# Patient Record
Sex: MALE | Race: WHITE | NOT HISPANIC OR LATINO | Employment: OTHER | URBAN - METROPOLITAN AREA
[De-identification: names, ages, dates, MRNs, and addresses within clinical notes are randomized per-mention and may not be internally consistent; named-entity substitution may affect disease eponyms.]

---

## 2018-12-26 ENCOUNTER — HOSPITAL ENCOUNTER (EMERGENCY)
Facility: HOSPITAL | Age: 48
Discharge: HOME | End: 2018-12-26
Attending: EMERGENCY MEDICINE
Payer: MEDICARE

## 2018-12-26 VITALS
RESPIRATION RATE: 20 BRPM | BODY MASS INDEX: 37.03 KG/M2 | WEIGHT: 258.69 LBS | DIASTOLIC BLOOD PRESSURE: 88 MMHG | SYSTOLIC BLOOD PRESSURE: 136 MMHG | OXYGEN SATURATION: 96 % | HEART RATE: 84 BPM | TEMPERATURE: 99 F | HEIGHT: 70 IN

## 2018-12-26 DIAGNOSIS — R11.2 NAUSEA AND VOMITING, INTRACTABILITY OF VOMITING NOT SPECIFIED, UNSPECIFIED VOMITING TYPE: ICD-10-CM

## 2018-12-26 DIAGNOSIS — R51.9 NONINTRACTABLE HEADACHE, UNSPECIFIED CHRONICITY PATTERN, UNSPECIFIED HEADACHE TYPE: Primary | ICD-10-CM

## 2018-12-26 PROCEDURE — 25000003 PHARM REV CODE 250: Performed by: PHYSICIAN ASSISTANT

## 2018-12-26 PROCEDURE — 63600175 PHARM REV CODE 636 W HCPCS: Performed by: PHYSICIAN ASSISTANT

## 2018-12-26 PROCEDURE — 99284 EMERGENCY DEPT VISIT MOD MDM: CPT | Mod: 25

## 2018-12-26 PROCEDURE — 96375 TX/PRO/DX INJ NEW DRUG ADDON: CPT

## 2018-12-26 PROCEDURE — 96365 THER/PROPH/DIAG IV INF INIT: CPT

## 2018-12-26 RX ORDER — METHYLPREDNISOLONE 4 MG/1
TABLET ORAL
Qty: 1 PACKAGE | Refills: 0 | Status: SHIPPED | OUTPATIENT
Start: 2018-12-26 | End: 2019-02-11

## 2018-12-26 RX ORDER — DULOXETIN HYDROCHLORIDE 30 MG/1
30 CAPSULE, DELAYED RELEASE ORAL DAILY
Status: ON HOLD | COMMUNITY
End: 2020-09-07 | Stop reason: CLARIF

## 2018-12-26 RX ORDER — DIPHENHYDRAMINE HYDROCHLORIDE 50 MG/ML
25 INJECTION INTRAMUSCULAR; INTRAVENOUS
Status: COMPLETED | OUTPATIENT
Start: 2018-12-26 | End: 2018-12-26

## 2018-12-26 RX ORDER — PROMETHAZINE HYDROCHLORIDE 25 MG/1
25 TABLET ORAL EVERY 6 HOURS PRN
Qty: 12 TABLET | Refills: 0 | Status: SHIPPED | OUTPATIENT
Start: 2018-12-26 | End: 2019-10-23

## 2018-12-26 RX ORDER — HYDROMORPHONE HYDROCHLORIDE 2 MG/ML
0.5 INJECTION, SOLUTION INTRAMUSCULAR; INTRAVENOUS; SUBCUTANEOUS
Status: COMPLETED | OUTPATIENT
Start: 2018-12-26 | End: 2018-12-26

## 2018-12-26 RX ORDER — KETOROLAC TROMETHAMINE 30 MG/ML
15 INJECTION, SOLUTION INTRAMUSCULAR; INTRAVENOUS
Status: COMPLETED | OUTPATIENT
Start: 2018-12-26 | End: 2018-12-26

## 2018-12-26 RX ADMIN — HYDROMORPHONE HYDROCHLORIDE 0.5 MG: 2 INJECTION INTRAMUSCULAR; INTRAVENOUS; SUBCUTANEOUS at 07:12

## 2018-12-26 RX ADMIN — PROMETHAZINE HYDROCHLORIDE 25 MG: 25 INJECTION INTRAMUSCULAR; INTRAVENOUS at 07:12

## 2018-12-26 RX ADMIN — DIPHENHYDRAMINE HYDROCHLORIDE 25 MG: 50 INJECTION, SOLUTION INTRAMUSCULAR; INTRAVENOUS at 07:12

## 2018-12-26 RX ADMIN — KETOROLAC TROMETHAMINE 15 MG: 30 INJECTION INTRAMUSCULAR; INTRAVENOUS at 07:12

## 2018-12-27 ENCOUNTER — PES CALL (OUTPATIENT)
Dept: ADMINISTRATIVE | Facility: CLINIC | Age: 48
End: 2018-12-27

## 2018-12-27 NOTE — ED PROVIDER NOTES
"   History      Chief Complaint   Patient presents with    Headache     Pt states, "I suffer from cluster headaches and it has made me vomit and I can't keep anything down."       Review of patient's allergies indicates:  No Known Allergies     HPI   HPI    12/26/2018, 7:41 PM   History obtained from the patient      History of Present Illness: Keith Duane Crawford is a 48 y.o. male patient who presents to the Emergency Department for headache for 5 days.  Onset was gradual.  Pmh cluster headaches and says this feels like typical ha.  He has taken bp medication, triptan, aleve (last dose this am) with no relief.  Denies injury, fever, neck stiffness, vision change. Symptoms are moderate in severity.     No further complaints or concerns at this time.           PCP: Judson AGUSTIN Caro, MD       Past Medical History:  Past Medical History:   Diagnosis Date    Arthritis     Back pain     Depression     HTN (hypertension)     Hyperlipidemia     PRISCILLA on CPAP          Past Surgical History:  Past Surgical History:   Procedure Laterality Date    KNEE SURGERY             Family History:  No family history on file.        Social History:  Social History     Tobacco Use    Smoking status: Current Some Day Smoker     Packs/day: 1.00   Substance and Sexual Activity    Alcohol use: No     Alcohol/week: 0.0 oz    Drug use: No    Sexual activity: Not on file       ROS   Review of Systems   Constitutional: Negative for chills and fever.   HENT: Negative for ear pain, facial swelling and sore throat.    Eyes: Negative for pain and visual disturbance.   Respiratory: Negative for shortness of breath.    Cardiovascular: Negative for chest pain.   Gastrointestinal: Negative for abdominal pain.   Genitourinary: Negative for dysuria.   Musculoskeletal: Negative for back pain and neck stiffness.   Skin: Negative for rash and wound.   Neurological: Positive for headaches. Negative for seizures, syncope, facial asymmetry, speech " "difficulty, weakness, light-headedness and numbness.   Hematological: Does not bruise/bleed easily.   All other systems reviewed and are negative.    Review of Systems    Physical Exam      Initial Vitals [12/26/18 1620]   BP Pulse Resp Temp SpO2   (!) 185/107 105 20 98 °F (36.7 °C) 97 %      MAP       --         Physical Exam  Vital signs and nursing notes reviewed.  Constitutional: Patient is in NAD. Awake and alert. Well-developed and well-nourished.  Head: Atraumatic. Normocephalic.  Eyes: PERRL. EOM intact. Conjunctivae nl. No scleral icterus.  ENT: Mucous membranes are moist. Oropharynx is clear.  TM's normal bilaterally.  No mastoid tenderness  Neck: Supple. No JVD. No lymphadenopathy.  No meningismus  Cardiovascular: Regular rate and rhythm. No murmurs, rubs, or gallops. Distal pulses are 2+ and symmetric.  Pulmonary/Chest: No respiratory distress. Clear to auscultation bilaterally. No wheezing, rales, or rhonchi.  Abdominal: Soft. Non-distended. No TTP. No rebound, guarding, or rigidity. Good bowel sounds.  Genitourinary: No CVA tenderness  Musculoskeletal: Moves all extremities. No edema.   Skin: Warm and dry.  Neurological: Awake and alert. No acute focal neurological deficits are appreciated.  No facial droop.  Tongue is midline.  No pronator drift.  Finger to nose normal.  Hand  equal and strong, 5/5 motor strength x 4.    Psychiatric: Normal affect. Good eye contact. Appropriate in content.      ED Course          Procedures  ED Vital Signs:  Vitals:    12/26/18 1620 12/26/18 1904 12/26/18 2023   BP: (!) 185/107 (!) 135/94 136/88   Pulse: 105 94 84   Resp: 20     Temp: 98 °F (36.7 °C) 99.2 °F (37.3 °C)    TempSrc: Oral Oral    SpO2: 97% 96% 96%   Weight: 117.4 kg (258 lb 11.4 oz)     Height: 5' 10" (1.778 m)                   Imaging Results:  Imaging Results    None            The Emergency Provider reviewed the vital signs and test results, which are outlined above.    ED Discussion       "       Medication(s) given in the ER:  Medications   hydromorphone (PF) injection 0.5 mg (0.5 mg Intravenous Given 12/26/18 1918)   diphenhydrAMINE injection 25 mg (25 mg Intravenous Given 12/26/18 1915)   promethazine (PHENERGAN) 25 mg in dextrose 5 % 50 mL IVPB (0 mg Intravenous Stopped 12/26/18 1953)   ketorolac injection 15 mg (15 mg Intravenous Given 12/26/18 1916)           Follow-up Information     Judson AGUSTIN Caro, MD In 2 days.    Specialty:  Family Medicine  Contact information:  [place] [address]  [phone]                      Medication List      START taking these medications    methylPREDNISolone 4 mg tablet  Commonly known as:  MEDROL DOSEPACK  As directed        CHANGE how you take these medications    promethazine 25 MG tablet  Commonly known as:  PHENERGAN  Take 1 tablet (25 mg total) by mouth every 6 (six) hours as needed for Nausea.  What changed:    · how to take this  · when to take this  · reasons to take this        ASK your doctor about these medications    diazePAM 5 MG tablet  Commonly known as:  VALIUM     dihydroergotamine 0.5 mg/pump act. (4 mg/mL) nasal spray  Commonly known as:  MIGRANAL     DULoxetine 30 MG capsule  Commonly known as:  CYMBALTA     ergocalciferol 50,000 unit Cap  Commonly known as:  ERGOCALCIFEROL     fenofibrate 160 MG Tab     haloperidol 5 MG tablet  Commonly known as:  HALDOL  1 pill at noon daily.     hydrocodone-ibuprofen 7.5-200 mg 7.5-200 mg per tablet  Commonly known as:  VICOPROFEN     indomethacin 50 MG capsule  Commonly known as:  INDOCIN     ketorolac 15.75 mg/spray Spry     lisinopril-hydrochlorothiazide 20-12.5 mg per tablet  Commonly known as:  PRINZIDE,ZESTORETIC     magnesium 250 mg Tab     pantoprazole 20 MG tablet  Commonly known as:  PROTONIX     sertraline 100 MG tablet  Commonly known as:  ZOLOFT     sumatriptan 6 mg/0.5 mL kit  Commonly known as:  IMITREX STATDOSE  1 injection onset migraine, second 2 hours later, no more than  2 per day/3 days use     tiZANidine 4 MG tablet  Commonly known as:  ZANAFLEX  Take 1 tablet (4 mg total) by mouth every 8 (eight) hours as needed.     traZODone 50 MG tablet  Commonly known as:  DESYREL  TAKE 1 1/2 TABLET BY MOUTH EVERY NIGHT AT BEDTIME     verapamil 120 MG tablet  Commonly known as:  CALAN  TAKE 1 TABLET BY MOUTH THREE TIMES DAILY           Where to Get Your Medications      You can get these medications from any pharmacy    Bring a paper prescription for each of these medications  · methylPREDNISolone 4 mg tablet  · promethazine 25 MG tablet           This SmartLink is deprecated. Use Handprint instead to display the medication list for a patient.       Medical Decision Making      Pt says he feels much better and is ready for dc.  All findings were reviewed with the patient/family in detail.   All remaining questions and concerns were addressed at that time.  Patient/family has been counseled regarding the need for follow-up as well as the indication to return to the emergency room should new or worrisome developments occur.        MDM               Clinical Impression:        ICD-10-CM ICD-9-CM   1. Nonintractable headache, unspecified chronicity pattern, unspecified headache type R51 784.0   2. Nausea and vomiting, intractability of vomiting not specified, unspecified vomiting type R11.2 787.01             Sonia Samuel PA-C  12/26/18 2140

## 2019-01-18 ENCOUNTER — TELEPHONE (OUTPATIENT)
Dept: NEUROLOGY | Facility: CLINIC | Age: 49
End: 2019-01-18

## 2019-01-18 NOTE — TELEPHONE ENCOUNTER
----- Message from Philippe Portillo sent at 1/18/2019  2:51 PM CST -----  Contact: Patient @ [phone]  Patient calling to schedule a NP appt, valery call

## 2019-02-11 ENCOUNTER — OFFICE VISIT (OUTPATIENT)
Dept: NEUROLOGY | Facility: CLINIC | Age: 49
End: 2019-02-11
Payer: MEDICARE

## 2019-02-11 VITALS
HEART RATE: 73 BPM | SYSTOLIC BLOOD PRESSURE: 155 MMHG | BODY MASS INDEX: 37.21 KG/M2 | WEIGHT: 259.94 LBS | HEIGHT: 70 IN | DIASTOLIC BLOOD PRESSURE: 108 MMHG

## 2019-02-11 DIAGNOSIS — G47.33 OSA (OBSTRUCTIVE SLEEP APNEA): ICD-10-CM

## 2019-02-11 DIAGNOSIS — G44.021 INTRACTABLE CHRONIC CLUSTER HEADACHE: ICD-10-CM

## 2019-02-11 DIAGNOSIS — F41.9 ANXIETY: ICD-10-CM

## 2019-02-11 PROCEDURE — 3008F BODY MASS INDEX DOCD: CPT | Mod: CPTII,S$GLB,, | Performed by: PSYCHIATRY & NEUROLOGY

## 2019-02-11 PROCEDURE — 3008F PR BODY MASS INDEX (BMI) DOCUMENTED: ICD-10-PCS | Mod: CPTII,S$GLB,, | Performed by: PSYCHIATRY & NEUROLOGY

## 2019-02-11 PROCEDURE — 99999 PR PBB SHADOW E&M-EST. PATIENT-LVL III: ICD-10-PCS | Mod: PBBFAC,,, | Performed by: PSYCHIATRY & NEUROLOGY

## 2019-02-11 PROCEDURE — 99214 OFFICE O/P EST MOD 30 MIN: CPT | Mod: S$GLB,,, | Performed by: PSYCHIATRY & NEUROLOGY

## 2019-02-11 PROCEDURE — 99999 PR PBB SHADOW E&M-EST. PATIENT-LVL III: CPT | Mod: PBBFAC,,, | Performed by: PSYCHIATRY & NEUROLOGY

## 2019-02-11 PROCEDURE — 99499 UNLISTED E&M SERVICE: CPT | Mod: S$GLB,,, | Performed by: PSYCHIATRY & NEUROLOGY

## 2019-02-11 PROCEDURE — 99214 PR OFFICE/OUTPT VISIT, EST, LEVL IV, 30-39 MIN: ICD-10-PCS | Mod: S$GLB,,, | Performed by: PSYCHIATRY & NEUROLOGY

## 2019-02-11 PROCEDURE — 99499 RISK ADDL DX/OHS AUDIT: ICD-10-PCS | Mod: S$GLB,,, | Performed by: PSYCHIATRY & NEUROLOGY

## 2019-02-11 RX ORDER — ALLOPURINOL 300 MG/1
300 TABLET ORAL DAILY
COMMUNITY
Start: 2018-12-27 | End: 2019-10-23

## 2019-02-11 RX ORDER — SUMATRIPTAN SUCCINATE 100 MG/1
100 TABLET ORAL
Status: ON HOLD | COMMUNITY
End: 2019-12-06 | Stop reason: HOSPADM

## 2019-02-11 RX ORDER — BUTORPHANOL TARTRATE 10 MG/ML
1 SPRAY NASAL
Status: ON HOLD | COMMUNITY
Start: 2019-01-23 | End: 2019-12-06 | Stop reason: HOSPADM

## 2019-02-11 RX ORDER — VERAPAMIL HYDROCHLORIDE 240 MG/1
240 TABLET, FILM COATED, EXTENDED RELEASE ORAL 2 TIMES DAILY
Qty: 60 TABLET | Refills: 11 | Status: SHIPPED | OUTPATIENT
Start: 2019-02-11 | End: 2019-10-23

## 2019-02-11 NOTE — PROGRESS NOTES
"Subjective:       Patient ID: Keith Duane Crawford is a 48 y.o. male.    Chief Complaint: Headache    HPI   Ilene Gomez MD, MPH 7/11/18  Interim Headache History:   Mr. Hoffmann returned for follow-up of cluster headache as part of the CGAM (galcanezumab) study (he is in open label treatment, phase IV of the study). He was doing well until July 6, with a few cluster headaches weekly. He used a triptan for them 1-2 times weekly and he did not treat the others. His pattern has been like that for a while with overall improvement (decreased frequency and intensity) compared to baseline. His previous interictal pain had resolved over the past 6 months.     On July 6, he got a cluster headache and used sumatriptan injection that did not do much. He did not sleep that night or the next 2 nights because the pain was worse at night. The pain improved with treatment but did not totally resolve. The pain went away on July 9 in the morning for 3.5 hours and he slept for 3 hours. The headache returned shortly after awakening and he was up again until yesterday morning. He started having auditory hallucinations (which has occurred before because of lack of sleep), blurred vision and "muscle spasms" around and behind his eyes and in his leg. He took Ambien yesterday and slept for about 4 hours. He was headache free when he woke up and then it recurred.     I reviewed his history from the first visit and this pain is the same. He has the headache now. The pain is left side in the anterior side of the head above the ear. It feels like a spike and there is rhinorrhea. When it is not spiking, it feels like "pressing on a bruise" with dull pain.    Oxygen does not work for him. He has been using sumatriptan and tablets with Zofran for nausea since July 6. He has used five 6 ml sumatriptan injections and 3 tablets between July 6 and now. He took Relpax this morning.    He does not recall whether he had a specific time of year " "when he got headaches when they were episodic but recalls being hospitalized in June and August for them in the past before they became chronic so this could be his "season".     He previously went to the ER and got Dilaudid to break his cycle.     Interim Health History: There are no new medical problems since the last visit.     Medications (reviewed in detail with the patient):   Medication Sig Comments    aspirin-acetaminophen-caffeine (GOODY'S EXTRA STRENGTH) 520-260-32.5 mg Oral PwPk Take 2 Packets by mouth four times daily as needed. Max of 3000 mg acetaminophen/24hrs (all sources). 2-3 daily about 3-4 days weekly    DULoxetine delayed release (CYMBALTA) 20 mg oral capsule Take 20 mg by mouth daily. Do not chew, crush, or open capsules.    eletriptan (RELPAX) 40 mg oral tablet Take 40 mg by mouth ONCE PRN. May repeat dose once in 2 hrs if needed. Do not exceed 80 mg/24 hr.    ondansetron (ZOFRAN) 8 mg oral tablet Take 8 mg by mouth as needed.    sumatriptan (IMITREX) 100 mg oral tablet Take 100 mg by mouth ONCE PRN for Migraine. May repeat once after 2 hrs. MAX=200 mg (2 tabs)/24 hrs.    SUMAtriptan (IMITREX) 6 mg/0.5 mL subcutaneous injection Inject 6 mg under skin ONCE PRN. May repeat dose once after 1 hr. No more than 2 doses/24 hrs.   He ran out of lisinopril/HCTZ 6 weeks ago.   Allergies: None    EXAMINATION  Vital signs: Blood pressure 167/118 (164/128), pulse 98, Temp 98.4  Sitting in a chair with rhinorrhea, clearly in extreme discomfort.    ASSESSMENT AND PLAN:   Glacanezumab injections were given per protocol.  He had an appointment to see his PCP in [place] today and does not think that he will have a problem getting in to see him tomorrow instead for his hypertension.    12/28/18 ER  Care Timeline   1612   Arrived   1915   diphenhydramine HCl 25 mg   1916   ketorolac tromethamine 15 mg   1918   hydromorphone HCl/PF 0.5 mg   1926   promethazine (PHENERGAN) 25 mg in dextrose 5 % 50... 25 mg " "  2026   Discharged       Giorgio's note: 4/2016  F/U: 1 month for nerve block and 2 months for botox      S/O: Comes in with his wife, has daily pain, been to ED x2 since last visit. Went to see mental health. States he uses THC every day because "this keeps me from thinking about the fucking pain." Asks about DHE (then tells examiner has had in the past, this not helping). Nerve blocks give him short relief. No side effects from botox, but no help. Infusion of haldol in ED is not helping anymore. He was tearful stating "nothing helps" and feeling "hopeless."     His wife mentions they are thinking about getting an opinion of a nerve stimulator.       Med h/o   Glacanezumab:   Jan - April drug vs placebo   April - March 2019 - monthly 300 mg SQ   severity and Hz less     BOTOX x 4 yrs - later on ineffective   Ketamine infusion -0 3 visits - only temp relief   SPG - Qtip - temp relief     trazadone - not help   sumitrip 100 mg - not help   imitrex 6 mg - helps (5 inj a week)   Stadol nasal - 2 sprays Q4-6 hrs     Failed:   Verapamil, vpa, seroquel, topamax, lyrica, lamictal, keppra, lithium (caused SI)     O2 - not help     For severe pain and hallucinations:   IV steroid, haldol, phenergan, benadryl, dilaudid - helps him sleep     Cluster h/o:   first HA in 2001, recurred in 2011, daily since    5 days a week for 7 yrs   Brief electrical shock on the L side     Early afternoon onset   Cont bruising pain on the L side   Bursts of severe bouts - 30 min   Redness, tear up, macarena nasal   Nausea also   Wakes him out of sleep multiple times     Had 1 episode of SI   Has h/o opiate use for knee and back pain     Used to work as    Going through a divorce     Review of Systems   Constitutional: Negative.    HENT: Negative.    Eyes: Negative.    Respiratory: Negative.    Cardiovascular: Negative.    Gastrointestinal: Negative.    Endocrine: Negative.    Genitourinary: Negative.    Musculoskeletal: Negative.  "   Allergic/Immunologic: Negative.    Neurological: Positive for headaches.   Hematological: Negative.    Psychiatric/Behavioral: Positive for sleep disturbance. The patient is nervous/anxious.        Objective:      Physical Exam   Constitutional: He is oriented to person, place, and time. He appears well-developed.   Obese     HENT:   Head: Normocephalic and atraumatic.   Right Ear: External ear normal.   Left Ear: External ear normal.   Nose: Nose normal.   Mouth/Throat: Oropharynx is clear and moist.   Old hyperpigmented  scar on L eye brow    Eyes: Conjunctivae and EOM are normal. Pupils are equal, round, and reactive to light.   Neck: Normal range of motion. Neck supple.   Cardiovascular: Normal rate and regular rhythm.   Pulmonary/Chest: Effort normal and breath sounds normal.   Musculoskeletal: Normal range of motion.   Neurological: He is alert and oriented to person, place, and time. He displays normal reflexes. No cranial nerve deficit or sensory deficit. He exhibits normal muscle tone. Coordination normal.   Skin: Skin is warm and dry.   Psychiatric: He has a normal mood and affect. His behavior is normal. Judgment and thought content normal.       Assessment:       1. Intractable chronic cluster headache    2. Anxiety    3. PRISCILLA (obstructive sleep apnea)        Plan:   Cont:   Allopurinol   cymbalta 30 mg BID   fenofibrate   trazadone 50 mg   Stadol  PRN  Valium PRN  imitrex 6 mg SQ   imitrex 100 mg oral   zofran 8 mg      Cont Emgality     Start Calan 240 mg BID and gammacore   See sleep specialist   See pain management for Stadol    Options: Felbamate, namenda, VINODG  Cont with Post trauma therapy group in [place]

## 2019-02-13 NOTE — PROGRESS NOTES
Patient, Keith Duane Crawford (MRN #[id number]), presented with a recorded BMI of 37.29 kg/m^2 and a documented comorbidity(s):  - Obstructive Sleep Apnea  to which the severe obesity is a contributing factor. This is consistent with the definition of severe obesity (BMI 35.0-39.9) with comorbidity (ICD-10 E66.01, Z68.35). The patient's severe obesity was monitored, evaluated, addressed and/or treated. This addendum to the medical record is made on 02/13/2019.

## 2019-04-01 ENCOUNTER — HOSPITAL ENCOUNTER (EMERGENCY)
Facility: HOSPITAL | Age: 49
Discharge: HOME | End: 2019-04-01
Attending: EMERGENCY MEDICINE
Payer: MEDICARE

## 2019-04-01 VITALS
BODY MASS INDEX: 38.06 KG/M2 | TEMPERATURE: 98 F | DIASTOLIC BLOOD PRESSURE: 71 MMHG | WEIGHT: 265.88 LBS | HEART RATE: 65 BPM | HEIGHT: 70 IN | RESPIRATION RATE: 18 BRPM | OXYGEN SATURATION: 96 % | SYSTOLIC BLOOD PRESSURE: 122 MMHG

## 2019-04-01 DIAGNOSIS — G44.021 INTRACTABLE CHRONIC CLUSTER HEADACHE: Primary | ICD-10-CM

## 2019-04-01 PROCEDURE — 99284 EMERGENCY DEPT VISIT MOD MDM: CPT | Mod: 25

## 2019-04-01 PROCEDURE — 96375 TX/PRO/DX INJ NEW DRUG ADDON: CPT

## 2019-04-01 PROCEDURE — 25000003 PHARM REV CODE 250: Performed by: EMERGENCY MEDICINE

## 2019-04-01 PROCEDURE — 96361 HYDRATE IV INFUSION ADD-ON: CPT

## 2019-04-01 PROCEDURE — 63600175 PHARM REV CODE 636 W HCPCS: Performed by: EMERGENCY MEDICINE

## 2019-04-01 PROCEDURE — 96374 THER/PROPH/DIAG INJ IV PUSH: CPT

## 2019-04-01 RX ORDER — DIPHENHYDRAMINE HYDROCHLORIDE 50 MG/ML
50 INJECTION INTRAMUSCULAR; INTRAVENOUS
Status: COMPLETED | OUTPATIENT
Start: 2019-04-01 | End: 2019-04-01

## 2019-04-01 RX ORDER — PROCHLORPERAZINE EDISYLATE 5 MG/ML
10 INJECTION INTRAMUSCULAR; INTRAVENOUS
Status: COMPLETED | OUTPATIENT
Start: 2019-04-01 | End: 2019-04-01

## 2019-04-01 RX ORDER — KETOROLAC TROMETHAMINE 30 MG/ML
15 INJECTION, SOLUTION INTRAMUSCULAR; INTRAVENOUS
Status: COMPLETED | OUTPATIENT
Start: 2019-04-01 | End: 2019-04-01

## 2019-04-01 RX ORDER — DEXAMETHASONE SODIUM PHOSPHATE 4 MG/ML
8 INJECTION, SOLUTION INTRA-ARTICULAR; INTRALESIONAL; INTRAMUSCULAR; INTRAVENOUS; SOFT TISSUE
Status: COMPLETED | OUTPATIENT
Start: 2019-04-01 | End: 2019-04-01

## 2019-04-01 RX ADMIN — SODIUM CHLORIDE 1000 ML: 0.9 INJECTION, SOLUTION INTRAVENOUS at 04:04

## 2019-04-01 RX ADMIN — PROCHLORPERAZINE EDISYLATE 10 MG: 5 INJECTION INTRAMUSCULAR; INTRAVENOUS at 04:04

## 2019-04-01 RX ADMIN — DIPHENHYDRAMINE HYDROCHLORIDE 50 MG: 50 INJECTION INTRAMUSCULAR; INTRAVENOUS at 04:04

## 2019-04-01 RX ADMIN — KETOROLAC TROMETHAMINE 15 MG: 30 INJECTION INTRAMUSCULAR; INTRAVENOUS at 04:04

## 2019-04-01 RX ADMIN — DEXAMETHASONE SODIUM PHOSPHATE 8 MG: 4 INJECTION, SOLUTION INTRAMUSCULAR; INTRAVENOUS at 04:04

## 2019-04-01 NOTE — ED NOTES
Patient complains of a cluster headache.     Level of Consciousness: The patient is awake, alert, and oriented with appropriate affect and speech; oriented to person, place and time.  Appearance: Laying down with lights off upon entering room. With lights on, no acute distress noted. Clothing and hygiene are clean and worn appropriately.  Skin: Skin is warm and dry with good skin turgor; intact; color consistent with ethnicity.  Mucous membranes are moist.   Musculoskeletal: Moves all extremities well in full range of motion. No obvious deformities or swelling noted.  Respiratory: Airway open and patent, respirations spontaneous, even and unlabored. No accessory muscles in use. Breath sounds clear and equal bilaterally.  Cardiac: Regular rate and rhythm, no peripheral edema noted, good pulses palpated peripherally, capillary refill < 3 seconds.  Abdomen: Soft, non-tender to palpation. No distention noted.  Neurologic: PERRLA, face exhibits symmetrical expression, hand grasps equal and even bilaterally, reports normal sensation to all extremities and face. Patient has complaints of chronic cluster headaches and rates current headache 10/10 and is tearful.  Psychosocial: WNL    Patient verbalized understanding of status and plan of care. Patient changed into hospital gown with assistance. Side rails up x 2, call light in reach, bed low and locked. Cardiac monitor applied to patient; alarms on, audible, and set. Wife and son at bedside. Will continue to monitor.

## 2019-04-01 NOTE — ED PROVIDER NOTES
SCRIBE #1 NOTE: I, Manuela Haro, am scribing for, and in the presence of, Kurtsi Alcantara MD. I have scribed the entire note.       History     Chief Complaint   Patient presents with    Headache     pt states he is having cluster headache, medications have not worked     Review of patient's allergies indicates:  No Known Allergies      History of Present Illness     HPI    4/1/2019, 4:17 PM  History obtained from the patient      History of Present Illness: Keith Duane Crawford is a 48 y.o. male patient with a PMHx of arthritis, depression, HTN, and hyperlipidema who presents to the Emergency Department for evaluation of a cluster headache which onset gradually 1.5-2 hours ago. Pt states he has a hx of cluster HA where he gets 4-5 headaches/week. Symptoms are constant and moderate in severity. No mitigating or exacerbating factors reported. No associated sxs included. Patient denies any fever, chills, SOB, cough, CP, abd pain, n/v, dizziness, extremity weakness/numbness, lightheadedness, visual disturbances, photophobia, and all other sxs at this time. Prior Tx includes Sumatriptan injection (1 today) with no relief. Pt reports oxygen does not work for him. Pt is currently in a CGAM (galcanezumab) study and states he is in the phase where he isn't receiving the drug. No further complaints or concerns at this time.       Arrival mode: Personal vehicle    PCP: Judson AGUSTIN Caro, MD        Past Medical History:  Past Medical History:   Diagnosis Date    Arthritis     Back pain     Cluster headaches     Depression     HTN (hypertension)     Hyperlipidemia     PRISCILLA on CPAP        Past Surgical History:  Past Surgical History:   Procedure Laterality Date    KNEE SURGERY           Family History:  History reviewed. No pertinent family history.    Social History:  Social History     Tobacco Use    Smoking status: Current Some Day Smoker     Packs/day: 1.00   Substance and Sexual Activity    Alcohol use: No      Alcohol/week: 0.0 oz    Drug use: No    Sexual activity: Unknown        Review of Systems     Review of Systems   Constitutional: Negative for chills and fever.   HENT: Negative for rhinorrhea and sore throat.    Eyes: Negative for photophobia and visual disturbance.   Respiratory: Negative for cough and shortness of breath.    Cardiovascular: Negative for chest pain.   Gastrointestinal: Negative for abdominal pain, nausea and vomiting.   Genitourinary: Negative for dysuria, frequency and urgency.   Musculoskeletal: Negative for back pain.   Skin: Negative for rash.   Neurological: Positive for headaches. Negative for dizziness, weakness, light-headedness and numbness.   Hematological: Does not bruise/bleed easily.   All other systems reviewed and are negative.     Physical Exam     Initial Vitals [04/01/19 1545]   BP Pulse Resp Temp SpO2   (!) 199/103 94 18 97.9 °F (36.6 °C) 97 %      MAP       --          Physical Exam  Nursing Notes and Vital Signs Reviewed.  Constitutional: Patient is in mild distress. Well-developed and well-nourished.  Head: Atraumatic. Normocephalic.  Eyes: PERRL. EOM intact. Conjunctivae are not pale. No scleral icterus.  ENT: Mucous membranes are moist. Oropharynx is clear and symmetric.    Neck: Supple. Full ROM. No lymphadenopathy.  Cardiovascular: Regular rate. Regular rhythm. No murmurs, rubs, or gallops. Distal pulses are 2+ and symmetric.  Pulmonary/Chest: No respiratory distress. Clear to auscultation bilaterally. No wheezing or rales.  Abdominal: Soft and non-distended.  There is no tenderness.  No rebound, guarding, or rigidity.   Musculoskeletal: Moves all extremities. No obvious deformities. No edema. No calf tenderness.  Skin: Warm and dry.  Neurological:  Alert, awake, and appropriate.  Normal speech.  No acute focal neurological deficits are appreciated.  Psychiatric: Normal affect. Good eye contact. Appropriate in content.     ED Course   Procedures  ED Vital  "Signs:  Vitals:    04/01/19 1545 04/01/19 1618 04/01/19 1633 04/01/19 1708   BP: (!) 199/103 (!) 200/108 (!) 221/119 (!) 181/88   Pulse: 94 92 89 77   Resp: 18      Temp: 97.9 °F (36.6 °C)      TempSrc: Oral      SpO2: 97% 99% 99% 99%   Weight: 120.6 kg (265 lb 14 oz)      Height: 5' 10" (1.778 m)       04/01/19 1802 04/01/19 1814   BP: 122/71    Pulse: 65    Resp:     Temp:  97.6 °F (36.4 °C)   TempSrc:  Oral   SpO2: 96%    Weight:     Height:         Abnormal Lab Results:  Labs Reviewed - No data to display       Imaging Results:  Imaging Results    None                   The Emergency Provider reviewed the vital signs and test results, which are outlined above.     ED Discussion     5:57 PM: Re-evaluated pt. Pt is resting comfortably and is in no acute distress.  Pt states he feels better and is ready to go home.  D/w pt all pertinent results. D/w pt any concerns expressed at this time. Answered all questions. Pt expresses understanding at this time.     5:57 PM: Discussed with pt all pertinent ED information and results. Discussed pt dx and plan of tx. Gave pt all f/u and return to the ED instructions. All questions and concerns were addressed at this time. Pt expresses understanding of information and instructions, and is comfortable with plan to discharge. Pt is stable for discharge.    Patient's headache is either consistent with previous headache and/or lacks features concerning for emergent or life threatening condition.  I do not suspect SAH, meningitis, increased IC pressure, infectious, toxic, vascular, CNS, or other EMC.  I have discussed this at length with patient and/or family/caretaker.    ED Medication(s):  Medications   sodium chloride 0.9% bolus 1,000 mL (0 mLs Intravenous Stopped 4/1/19 1749)   diphenhydrAMINE injection 50 mg (50 mg Intravenous Given 4/1/19 1645)   prochlorperazine injection Soln 10 mg (10 mg Intravenous Given 4/1/19 1647)   ketorolac injection 15 mg (15 mg Intravenous Given " 4/1/19 1656)   dexamethasone injection 8 mg (8 mg Intravenous Given 4/1/19 1658)       New Prescriptions    No medications on file       Follow-up Information     Judson AGUSTIN Caro, MD In 2 days.    Specialty:  Family Medicine  Contact information:  [place] [address]  [phone]             [facility] - .    Specialty:  Emergency Medicine  Why:  If symptoms worsen  Contact information:  [address]  [facility] [address] [phone]                                   Scribe Attestation:   Scribe #1: I performed the above scribed service and the documentation accurately describes the services I performed. I attest to the accuracy of the note.     Attending:   Physician Attestation Statement for Scribe #1: I, Kurtis Alcantara MD, personally performed the services described in this documentation, as scribed by Manuela Haro, in my presence, and it is both accurate and complete.           Clinical Impression       ICD-10-CM ICD-9-CM   1. Intractable chronic cluster headache G44.021 339.02       Disposition:   Disposition: Discharged  Condition: Stable         Kurtis Alcantara MD  04/01/19 2198

## 2019-10-21 ENCOUNTER — HOSPITAL ENCOUNTER (EMERGENCY)
Facility: HOSPITAL | Age: 49
Discharge: HOME | End: 2019-10-21
Attending: EMERGENCY MEDICINE
Payer: MEDICARE

## 2019-10-21 VITALS
SYSTOLIC BLOOD PRESSURE: 121 MMHG | WEIGHT: 257.81 LBS | HEIGHT: 70 IN | RESPIRATION RATE: 18 BRPM | TEMPERATURE: 99 F | DIASTOLIC BLOOD PRESSURE: 78 MMHG | BODY MASS INDEX: 36.91 KG/M2 | HEART RATE: 68 BPM | OXYGEN SATURATION: 95 %

## 2019-10-21 DIAGNOSIS — G44.019 EPISODIC CLUSTER HEADACHE, NOT INTRACTABLE: Primary | ICD-10-CM

## 2019-10-21 LAB — HIV 1+2 AB+HIV1 P24 AG SERPL QL IA: NEGATIVE

## 2019-10-21 PROCEDURE — 99284 EMERGENCY DEPT VISIT MOD MDM: CPT | Mod: 25

## 2019-10-21 PROCEDURE — 63600175 PHARM REV CODE 636 W HCPCS: Performed by: NURSE PRACTITIONER

## 2019-10-21 PROCEDURE — 86703 HIV-1/HIV-2 1 RESULT ANTBDY: CPT

## 2019-10-21 PROCEDURE — 96361 HYDRATE IV INFUSION ADD-ON: CPT

## 2019-10-21 PROCEDURE — 96374 THER/PROPH/DIAG INJ IV PUSH: CPT

## 2019-10-21 PROCEDURE — 96375 TX/PRO/DX INJ NEW DRUG ADDON: CPT

## 2019-10-21 RX ORDER — METOCLOPRAMIDE HYDROCHLORIDE 5 MG/ML
10 INJECTION INTRAMUSCULAR; INTRAVENOUS
Status: COMPLETED | OUTPATIENT
Start: 2019-10-21 | End: 2019-10-21

## 2019-10-21 RX ORDER — HYDROMORPHONE HYDROCHLORIDE 2 MG/ML
0.5 INJECTION, SOLUTION INTRAMUSCULAR; INTRAVENOUS; SUBCUTANEOUS
Status: COMPLETED | OUTPATIENT
Start: 2019-10-21 | End: 2019-10-21

## 2019-10-21 RX ORDER — KETOROLAC TROMETHAMINE 30 MG/ML
15 INJECTION, SOLUTION INTRAMUSCULAR; INTRAVENOUS
Status: COMPLETED | OUTPATIENT
Start: 2019-10-21 | End: 2019-10-21

## 2019-10-21 RX ORDER — SODIUM CHLORIDE 9 MG/ML
1000 INJECTION, SOLUTION INTRAVENOUS
Status: COMPLETED | OUTPATIENT
Start: 2019-10-21 | End: 2019-10-21

## 2019-10-21 RX ORDER — TIZANIDINE HYDROCHLORIDE 4 MG/1
CAPSULE, GELATIN COATED ORAL
Status: ON HOLD | COMMUNITY
End: 2019-12-06 | Stop reason: HOSPADM

## 2019-10-21 RX ORDER — DIPHENHYDRAMINE HYDROCHLORIDE 50 MG/ML
25 INJECTION INTRAMUSCULAR; INTRAVENOUS
Status: COMPLETED | OUTPATIENT
Start: 2019-10-21 | End: 2019-10-21

## 2019-10-21 RX ADMIN — HYDROMORPHONE HYDROCHLORIDE 0.5 MG: 2 INJECTION INTRAMUSCULAR; INTRAVENOUS; SUBCUTANEOUS at 06:10

## 2019-10-21 RX ADMIN — SODIUM CHLORIDE 1000 ML: 0.9 INJECTION, SOLUTION INTRAVENOUS at 05:10

## 2019-10-21 RX ADMIN — DIPHENHYDRAMINE HYDROCHLORIDE 25 MG: 50 INJECTION, SOLUTION INTRAMUSCULAR; INTRAVENOUS at 05:10

## 2019-10-21 RX ADMIN — KETOROLAC TROMETHAMINE 15 MG: 30 INJECTION, SOLUTION INTRAMUSCULAR at 05:10

## 2019-10-21 RX ADMIN — METOCLOPRAMIDE 10 MG: 5 INJECTION, SOLUTION INTRAMUSCULAR; INTRAVENOUS at 05:10

## 2019-10-21 NOTE — ED PROVIDER NOTES
SCRIBE #1 NOTE: I, Xander Goddard, am scribing for, and in the presence of, Kurtis Alcantara MD. I have scribed the entire note.       History     Chief Complaint   Patient presents with    Headache     hx cluster HA     Review of patient's allergies indicates:  No Known Allergies      History of Present Illness     HPI    10/21/2019, 4:58 PM  History obtained from the patient      History of Present Illness: Keith Duane Crawford is a 49 y.o. male patient with a PMHx of cluster headaches who presents to the Emergency Department for evaluation of headache which onset gradually this morning. Symptoms are constant and moderate in severity. No mitigating or exacerbating factors reported. Patient denies any dizziness, visual disturbance, chest pain, shortness of breath, fever, chills, fatigue, nausea, abdominal pain, dysuria, and all other sxs at this time. Prior Tx includes Imitrex at home without relief. No further complaints or concerns at this time. Pt states current pain and location of pain is consistent with past history of chronic cluster headaches             PCP: Judson AGUSTIN Caro, MD        Past Medical History:  Past Medical History:   Diagnosis Date    Arthritis     Back pain     Cluster headaches     Depression     HTN (hypertension)     Hyperlipidemia     PRISCILLA on CPAP        Past Surgical History:  Past Surgical History:   Procedure Laterality Date    KNEE SURGERY           Family History:  No family history on file.    Social History:  Social History     Tobacco Use    Smoking status: Current Some Day Smoker     Packs/day: 1.00    Smokeless tobacco: Never Used   Substance and Sexual Activity    Alcohol use: No     Alcohol/week: 0.0 standard drinks    Drug use: No    Sexual activity: Not on file        Review of Systems     Review of Systems   Constitutional: Negative for chills, fatigue and fever.   HENT: Negative for sore throat.    Respiratory: Negative for chest tightness and shortness of  breath.    Cardiovascular: Negative for chest pain.   Gastrointestinal: Negative for abdominal pain, diarrhea, nausea and vomiting.   Genitourinary: Negative for dysuria.   Musculoskeletal: Negative for back pain.   Skin: Negative for rash.   Neurological: Positive for headaches (left sided). Negative for dizziness, weakness, light-headedness and numbness.   Hematological: Does not bruise/bleed easily.   Psychiatric/Behavioral: Negative for agitation.        Physical Exam     Initial Vitals [10/21/19 1645]   BP Pulse Resp Temp SpO2   (!) 150/93 99 16 98.6 °F (37 °C) 98 %      MAP       --          Physical Exam   Constitutional: He is oriented to person, place, and time. He appears well-developed and well-nourished. No distress.   HENT:   Head: Normocephalic and atraumatic.   Right Ear: External ear normal.   Left Ear: External ear normal.   Nose: Nose normal.   Mouth/Throat: Oropharynx is clear and moist. No oropharyngeal exudate.   Eyes: Right eye exhibits no discharge. Left eye exhibits no discharge.   Neck: Normal range of motion. Neck supple.   Cardiovascular: Normal rate, regular rhythm, normal heart sounds and intact distal pulses.   Pulmonary/Chest: Effort normal and breath sounds normal. No respiratory distress. He has no wheezes. He has no rales.   Abdominal: Soft. Bowel sounds are normal. He exhibits no distension. There is no tenderness. There is no guarding.   Musculoskeletal: Normal range of motion.   Neurological: He is alert and oriented to person, place, and time.   Skin: Skin is warm and dry. He is not diaphoretic.   Psychiatric: He has a normal mood and affect. His behavior is normal. Thought content normal.     Nursing Notes and Vital Signs Reviewed.       ED Course   Procedures  ED Vital Signs:  Vitals:    10/21/19 1645 10/21/19 1831 10/21/19 1921   BP: (!) 150/93 123/70 121/78   Pulse: 99 85 68   Resp: 16 20 18   Temp: 98.6 °F (37 °C)     TempSrc: Oral     SpO2: 98% 97% 95%   Weight: 117 kg  "(257 lb 13.3 oz)     Height: 5' 10" (1.778 m)         Abnormal Lab Results:  Labs Reviewed   HIV 1 / 2 ANTIBODY        Imaging Results:  Imaging Results    None                   The Emergency Provider reviewed the vital signs and test results, which are outlined above.     ED Discussion       7:26 PM  Patient reassessed at this time. Reports complete relief from symptoms. Pt instructed to follow up with neurology and to return to er for any worsening or concerns    I discussed with patient that the evaluation in the emergency department does not suggest any emergent or life threatening medical condition requiring immediate intervention beyond what was provided in the ED, and I believe patient is safe for discharge.  Regardless, an unremarkable evaluation in the ED does not preclude the development or presence of a serious of life threatening condition. As such, patient was instructed to return immediately for any worsening or change in current symptoms. I also discussed the results of my evaluation and diagnostics with patient and he concurs with the evaluation and management plan.  Detailed written and verbal instructions provided to patient and he expressed a verbal understanding of the discharge instructions and overall management plan. Reiterated the importance of medication administration and safety and advised patient to follow up with primary care provider in 3-5 days or sooner if needed.  Also advised patient to return to the ER for any complications.                    ED Medication(s):  Medications   0.9%  NaCl infusion (0 mLs Intravenous Stopped 10/21/19 1922)   diphenhydrAMINE injection 25 mg (25 mg Intravenous Given 10/21/19 1739)   ketorolac injection 15 mg (15 mg Intravenous Given 10/21/19 1741)   metoclopramide HCl injection 10 mg (10 mg Intravenous Given 10/21/19 1742)   hydromorphone (PF) injection 0.5 mg (0.5 mg Intravenous Given 10/21/19 1826)       New Prescriptions    No medications on file "       Follow-up Information     Judson AGUSTIN Caro, MD.    Specialty:  Family Medicine  Why:  As needed  Contact information:  [place] [address]  [phone]                       Scribe Attestation:   Scribe #1: I performed the above scribed service and the documentation accurately describes the services I performed. I attest to the accuracy of the note.     Attending:   Physician Attestation Statement for Scribe #1: I, Chago Kaur NP, personally performed the services described in this documentation, as scribed by Xander Goddard, in my presence, and it is both accurate and complete.           Clinical Impression       ICD-10-CM ICD-9-CM   1. Episodic cluster headache, not intractable G44.019 339.01               Chago Kaur NP  10/21/19 1929

## 2019-10-23 ENCOUNTER — TELEPHONE (OUTPATIENT)
Dept: NEUROLOGY | Facility: CLINIC | Age: 49
End: 2019-10-23

## 2019-10-23 ENCOUNTER — HOSPITAL ENCOUNTER (EMERGENCY)
Facility: HOSPITAL | Age: 49
Discharge: HOME | End: 2019-10-23
Attending: EMERGENCY MEDICINE
Payer: MEDICARE

## 2019-10-23 VITALS
SYSTOLIC BLOOD PRESSURE: 148 MMHG | DIASTOLIC BLOOD PRESSURE: 88 MMHG | BODY MASS INDEX: 35.89 KG/M2 | TEMPERATURE: 99 F | OXYGEN SATURATION: 97 % | HEART RATE: 99 BPM | WEIGHT: 250.69 LBS | RESPIRATION RATE: 18 BRPM | HEIGHT: 70 IN

## 2019-10-23 DIAGNOSIS — G44.009 CLUSTER HEADACHE, NOT INTRACTABLE, UNSPECIFIED CHRONICITY PATTERN: Primary | ICD-10-CM

## 2019-10-23 LAB
ALBUMIN SERPL BCP-MCNC: 5 G/DL (ref 3.5–5.2)
ALP SERPL-CCNC: 47 U/L (ref 55–135)
ALT SERPL W/O P-5'-P-CCNC: 47 U/L (ref 10–44)
ANION GAP SERPL CALC-SCNC: 13 MMOL/L (ref 8–16)
AST SERPL-CCNC: 28 U/L (ref 10–40)
BASOPHILS # BLD AUTO: 0.11 K/UL (ref 0–0.2)
BASOPHILS NFR BLD: 1 % (ref 0–1.9)
BILIRUB SERPL-MCNC: 0.5 MG/DL (ref 0.1–1)
BUN SERPL-MCNC: 12 MG/DL (ref 6–20)
CALCIUM SERPL-MCNC: 11.3 MG/DL (ref 8.7–10.5)
CHLORIDE SERPL-SCNC: 103 MMOL/L (ref 95–110)
CO2 SERPL-SCNC: 26 MMOL/L (ref 23–29)
CREAT SERPL-MCNC: 1 MG/DL (ref 0.5–1.4)
DIFFERENTIAL METHOD: ABNORMAL
EOSINOPHIL # BLD AUTO: 0.1 K/UL (ref 0–0.5)
EOSINOPHIL NFR BLD: 1.3 % (ref 0–8)
ERYTHROCYTE [DISTWIDTH] IN BLOOD BY AUTOMATED COUNT: 12.1 % (ref 11.5–14.5)
EST. GFR  (AFRICAN AMERICAN): >60 ML/MIN/1.73 M^2
EST. GFR  (NON AFRICAN AMERICAN): >60 ML/MIN/1.73 M^2
GLUCOSE SERPL-MCNC: 80 MG/DL (ref 70–110)
HCT VFR BLD AUTO: 44 % (ref 40–54)
HGB BLD-MCNC: 14.8 G/DL (ref 14–18)
IMM GRANULOCYTES # BLD AUTO: 0.04 K/UL (ref 0–0.04)
IMM GRANULOCYTES NFR BLD AUTO: 0.4 % (ref 0–0.5)
LYMPHOCYTES # BLD AUTO: 2.2 K/UL (ref 1–4.8)
LYMPHOCYTES NFR BLD: 20.5 % (ref 18–48)
MCH RBC QN AUTO: 31 PG (ref 27–31)
MCHC RBC AUTO-ENTMCNC: 33.6 G/DL (ref 32–36)
MCV RBC AUTO: 92 FL (ref 82–98)
MONOCYTES # BLD AUTO: 1 K/UL (ref 0.3–1)
MONOCYTES NFR BLD: 9.8 % (ref 4–15)
NEUTROPHILS # BLD AUTO: 7.1 K/UL (ref 1.8–7.7)
NEUTROPHILS NFR BLD: 67 % (ref 38–73)
NRBC BLD-RTO: 0 /100 WBC
PLATELET # BLD AUTO: 397 K/UL (ref 150–350)
PMV BLD AUTO: 10 FL (ref 9.2–12.9)
POTASSIUM SERPL-SCNC: 3.7 MMOL/L (ref 3.5–5.1)
PROT SERPL-MCNC: 8 G/DL (ref 6–8.4)
RBC # BLD AUTO: 4.78 M/UL (ref 4.6–6.2)
SODIUM SERPL-SCNC: 142 MMOL/L (ref 136–145)
WBC # BLD AUTO: 10.55 K/UL (ref 3.9–12.7)

## 2019-10-23 PROCEDURE — 99284 EMERGENCY DEPT VISIT MOD MDM: CPT | Mod: 25

## 2019-10-23 PROCEDURE — 80053 COMPREHEN METABOLIC PANEL: CPT

## 2019-10-23 PROCEDURE — 96365 THER/PROPH/DIAG IV INF INIT: CPT

## 2019-10-23 PROCEDURE — 96361 HYDRATE IV INFUSION ADD-ON: CPT

## 2019-10-23 PROCEDURE — 96375 TX/PRO/DX INJ NEW DRUG ADDON: CPT

## 2019-10-23 PROCEDURE — 63600175 PHARM REV CODE 636 W HCPCS: Performed by: NURSE PRACTITIONER

## 2019-10-23 PROCEDURE — 36415 COLL VENOUS BLD VENIPUNCTURE: CPT

## 2019-10-23 PROCEDURE — 63600175 PHARM REV CODE 636 W HCPCS: Performed by: EMERGENCY MEDICINE

## 2019-10-23 PROCEDURE — 85025 COMPLETE CBC W/AUTO DIFF WBC: CPT

## 2019-10-23 RX ORDER — AMLODIPINE BESYLATE 5 MG/1
5 TABLET ORAL DAILY
Status: ON HOLD | COMMUNITY
End: 2020-09-07 | Stop reason: CLARIF

## 2019-10-23 RX ORDER — SODIUM CHLORIDE 9 MG/ML
1000 INJECTION, SOLUTION INTRAVENOUS
Status: COMPLETED | OUTPATIENT
Start: 2019-10-23 | End: 2019-10-23

## 2019-10-23 RX ORDER — HYDROCODONE BITARTRATE AND ACETAMINOPHEN 10; 325 MG/1; MG/1
1 TABLET ORAL
Status: ON HOLD | COMMUNITY
End: 2019-12-06 | Stop reason: HOSPADM

## 2019-10-23 RX ORDER — DEXAMETHASONE SODIUM PHOSPHATE 4 MG/ML
8 INJECTION, SOLUTION INTRA-ARTICULAR; INTRALESIONAL; INTRAMUSCULAR; INTRAVENOUS; SOFT TISSUE
Status: COMPLETED | OUTPATIENT
Start: 2019-10-23 | End: 2019-10-23

## 2019-10-23 RX ORDER — METOCLOPRAMIDE HYDROCHLORIDE 5 MG/ML
10 INJECTION INTRAMUSCULAR; INTRAVENOUS
Status: COMPLETED | OUTPATIENT
Start: 2019-10-23 | End: 2019-10-23

## 2019-10-23 RX ORDER — ZOLPIDEM TARTRATE 10 MG/1
5 TABLET ORAL NIGHTLY PRN
Status: ON HOLD | COMMUNITY
End: 2020-09-07 | Stop reason: CLARIF

## 2019-10-23 RX ORDER — PREDNISONE 20 MG/1
TABLET ORAL
Qty: 17 TABLET | Refills: 0 | Status: ON HOLD | OUTPATIENT
Start: 2019-10-23 | End: 2019-12-06 | Stop reason: HOSPADM

## 2019-10-23 RX ORDER — DIPHENHYDRAMINE HYDROCHLORIDE 50 MG/ML
12.5 INJECTION INTRAMUSCULAR; INTRAVENOUS
Status: COMPLETED | OUTPATIENT
Start: 2019-10-23 | End: 2019-10-23

## 2019-10-23 RX ORDER — METOCLOPRAMIDE 10 MG/1
10 TABLET ORAL EVERY 6 HOURS PRN
Qty: 30 TABLET | Refills: 0 | Status: ON HOLD | OUTPATIENT
Start: 2019-10-23 | End: 2019-12-06 | Stop reason: HOSPADM

## 2019-10-23 RX ORDER — HYDROMORPHONE HYDROCHLORIDE 2 MG/ML
1 INJECTION, SOLUTION INTRAMUSCULAR; INTRAVENOUS; SUBCUTANEOUS
Status: COMPLETED | OUTPATIENT
Start: 2019-10-23 | End: 2019-10-23

## 2019-10-23 RX ADMIN — METOCLOPRAMIDE 10 MG: 5 INJECTION, SOLUTION INTRAMUSCULAR; INTRAVENOUS at 12:10

## 2019-10-23 RX ADMIN — PROMETHAZINE HYDROCHLORIDE 12.5 MG: 25 INJECTION INTRAMUSCULAR; INTRAVENOUS at 12:10

## 2019-10-23 RX ADMIN — SODIUM CHLORIDE 1000 ML: 0.9 INJECTION, SOLUTION INTRAVENOUS at 12:10

## 2019-10-23 RX ADMIN — DEXAMETHASONE SODIUM PHOSPHATE 8 MG: 4 INJECTION, SOLUTION INTRA-ARTICULAR; INTRALESIONAL; INTRAMUSCULAR; INTRAVENOUS; SOFT TISSUE at 01:10

## 2019-10-23 RX ADMIN — DIPHENHYDRAMINE HYDROCHLORIDE 12.5 MG: 50 INJECTION, SOLUTION INTRAMUSCULAR; INTRAVENOUS at 12:10

## 2019-10-23 RX ADMIN — HYDROMORPHONE HYDROCHLORIDE 1 MG: 2 INJECTION INTRAMUSCULAR; INTRAVENOUS; SUBCUTANEOUS at 12:10

## 2019-10-23 NOTE — ED NOTES
Patient identifiers verified and correct for Keith Duane Crawford.    Pt to ED c/o migraine with nausea and photophobia. Pt reports PMHx of cluster headaches and being hospitalized for DTE infusions.     LOC: The patient is awake, alert and aware of environment with an appropriate affect, the patient is oriented x 3 and speaking appropriately.  APPEARANCE: Patient appears uncomfortable and in acute distress, patient is clean and well groomed, patient's clothing is properly fastened. Pt wearing sunglasses.   SKIN: The skin is warm and dry, color consistent with ethnicity, patient has normal skin turgor and moist mucus membranes, skin intact, no breakdown or bruising noted.  MUSCULOSKELETAL: Patient moving all extremities spontaneously.  RESPIRATORY: Airway is open and patent, respirations are spontaneous.  CARDIAC: Patient has a tachycardic rate at 166 BPM, no periphreal edema noted, capillary refill < 3 seconds.   ABDOMEN: Soft and non tender to palpation.  NEURO: WNL except migraine rating 7/10 on numeric pain scale.

## 2019-10-23 NOTE — TELEPHONE ENCOUNTER
----- Message from Sheila Pearce sent at 10/23/2019  3:50 PM CDT -----  Contact: Patient  Type:  Sooner Apoointment Request    Caller is requesting a sooner appointment.  Caller declined first available appointment listed below.  Caller will not accept being placed on the waitlist and is requesting a message be sent to doctor.    Name of Caller: RUSS  When is the first available appointment?  1/22/2020  Symptoms:  CLUSTER OF HEADACHES  Best Call Back Number: [phone] (home)   Additional Information:  Patient was seen at the ER twice this week and was told to see the Dr in two days for his headaches but there was no avail appt he said he would like a call back to be seen this week if possible.

## 2019-10-23 NOTE — ED PROVIDER NOTES
Encounter Date: 10/23/2019       History     Chief Complaint   Patient presents with    Headache     x 5 days, hx of cluster headaches, pt anxious and trembling in triage, reports speaking to a nurse and sent him here for neuro consult      49 year old male with complaint of left sided headache X 3 days.  Reports nausea and vomiting with pain.  Reports history of cluster headaches and pain today consistent with pain in past.  Pt reports that he called to schedule appointment with neurology and pt was sent to ER for neuro consult.  No neck pain. No fever or chills.         Review of patient's allergies indicates:  No Known Allergies  Past Medical History:   Diagnosis Date    Arthritis     Back pain     Cluster headaches     Depression     HTN (hypertension)     Hyperlipidemia     PRISCILLA on CPAP      Past Surgical History:   Procedure Laterality Date    KNEE SURGERY       History reviewed. No pertinent family history.  Social History     Tobacco Use    Smoking status: Current Some Day Smoker     Packs/day: 1.00    Smokeless tobacco: Never Used   Substance Use Topics    Alcohol use: No     Alcohol/week: 0.0 standard drinks    Drug use: No     Review of Systems   Constitutional: Negative for fever.   HENT: Negative for sore throat.    Respiratory: Negative for shortness of breath.    Cardiovascular: Negative for chest pain.   Gastrointestinal: Negative for nausea.   Genitourinary: Negative for dysuria.   Musculoskeletal: Negative for back pain.   Skin: Negative for rash.   Neurological: Positive for headaches. Negative for weakness.   Hematological: Does not bruise/bleed easily.       Physical Exam     Initial Vitals [10/23/19 1119]   BP Pulse Resp Temp SpO2   (!) 179/96 (!) 116 20 98.6 °F (37 °C) 97 %      MAP       --         Physical Exam    Nursing note and vitals reviewed.  Constitutional: He appears well-developed and well-nourished.   HENT:   Head: Normocephalic and atraumatic.   Eyes: Conjunctivae are  normal. Pupils are equal, round, and reactive to light.   Neck: Normal range of motion. Neck supple.   Cardiovascular: Normal rate, regular rhythm, normal heart sounds and intact distal pulses.   Pulmonary/Chest: Breath sounds normal.   Abdominal: Soft. There is no rebound and no guarding.   Musculoskeletal: Normal range of motion.   Neurological: He is alert.   Skin: Skin is warm and dry.   Psychiatric: He has a normal mood and affect. His behavior is normal. Thought content normal.         ED Course   Procedures  Labs Reviewed   CBC W/ AUTO DIFFERENTIAL - Abnormal; Notable for the following components:       Result Value    Platelets 397 (*)     All other components within normal limits   COMPREHENSIVE METABOLIC PANEL - Abnormal; Notable for the following components:    Calcium 11.3 (*)     Alkaline Phosphatase 47 (*)     ALT 47 (*)     All other components within normal limits          Imaging Results    None          Medical Decision Makin:57 PM  Case discussed with Dr. Smith.  Recommends prednisone taper and follow up with headache specialists with [facility] in [place]  1:16 PM  Pt reports pain at a level 3 at the moment.                      Clinical Impression:       ICD-10-CM ICD-9-CM   1. Cluster headache, not intractable, unspecified chronicity pattern G44.009 339.00                                Kirk Carr NP  10/23/19 6979

## 2019-10-23 NOTE — ED PROVIDER NOTES
49 year old male sent to ED by neurologic clinic for possible admission for intractable headache. Pt has been admitted in the past for IV steroids and DHE.

## 2019-10-23 NOTE — TELEPHONE ENCOUNTER
Called and spoke with patient. Appointment scheduled for tomorrow at 3 pm. Directions given. Patient verbalized understanding.

## 2019-10-24 ENCOUNTER — TELEPHONE (OUTPATIENT)
Dept: NEUROLOGY | Facility: CLINIC | Age: 49
End: 2019-10-24

## 2019-10-24 ENCOUNTER — OFFICE VISIT (OUTPATIENT)
Dept: NEUROLOGY | Facility: CLINIC | Age: 49
End: 2019-10-24
Payer: MEDICARE

## 2019-10-24 VITALS
DIASTOLIC BLOOD PRESSURE: 82 MMHG | SYSTOLIC BLOOD PRESSURE: 143 MMHG | RESPIRATION RATE: 17 BRPM | HEIGHT: 70 IN | BODY MASS INDEX: 36.04 KG/M2 | HEART RATE: 72 BPM | WEIGHT: 251.75 LBS

## 2019-10-24 DIAGNOSIS — G44.40 MEDICATION OVERUSE HEADACHE: ICD-10-CM

## 2019-10-24 DIAGNOSIS — G44.011 INTRACTABLE EPISODIC CLUSTER HEADACHE: Primary | ICD-10-CM

## 2019-10-24 PROCEDURE — 99215 PR OFFICE/OUTPT VISIT, EST, LEVL V, 40-54 MIN: ICD-10-PCS | Mod: S$GLB,,, | Performed by: PSYCHIATRY & NEUROLOGY

## 2019-10-24 PROCEDURE — 99215 OFFICE O/P EST HI 40 MIN: CPT | Mod: S$GLB,,, | Performed by: PSYCHIATRY & NEUROLOGY

## 2019-10-24 PROCEDURE — 99999 PR PBB SHADOW E&M-EST. PATIENT-LVL IV: CPT | Mod: PBBFAC,,, | Performed by: PSYCHIATRY & NEUROLOGY

## 2019-10-24 PROCEDURE — 3008F BODY MASS INDEX DOCD: CPT | Mod: CPTII,S$GLB,, | Performed by: PSYCHIATRY & NEUROLOGY

## 2019-10-24 PROCEDURE — 3008F PR BODY MASS INDEX (BMI) DOCUMENTED: ICD-10-PCS | Mod: CPTII,S$GLB,, | Performed by: PSYCHIATRY & NEUROLOGY

## 2019-10-24 PROCEDURE — 99999 PR PBB SHADOW E&M-EST. PATIENT-LVL IV: ICD-10-PCS | Mod: PBBFAC,,, | Performed by: PSYCHIATRY & NEUROLOGY

## 2019-10-24 RX ORDER — TRAZODONE HYDROCHLORIDE 100 MG/1
100 TABLET ORAL NIGHTLY
COMMUNITY
End: 2020-09-22

## 2019-10-24 NOTE — TELEPHONE ENCOUNTER
----- Message from Ting Araya MD sent at 10/24/2019  8:43 AM CDT -----  Good idea  ----- Message -----  From: Clau House MD  Sent: 10/23/2019   4:27 PM CDT  To: Ting Araya MD, Lacy Olguin Staff    Maybe we can get him in with Tania Jackson? What do you think?     ----- Message -----  From: Judson Orosco MD  Sent: 10/23/2019   2:01 PM CDT  To: Kirk Carr NP, Ting Araya MD, #    Hi,  I was called by the [facility] ED about this patient diagnosed with cluster.  Can one of you work him in over the next week or two?  He is going to call your office.  Thanks,  Rich

## 2019-10-24 NOTE — PATIENT INSTRUCTIONS
Please call our clinic at [phone] or send a message to Dr. House on the [employer] portal if there are any changes to the plan described below, for example,if you are not contacted for the requested tests, referral(s) within one week, if you are unable to receive the medications prescribed, or if you feel you need to change the treatment course for any reason.     TESTING:  -- schedule brain MRI with and without contrast     REFERRALS:  -- none     PREVENTION (use daily regardless of headache):  -- continue duloxetine 30mg daily at this time  -- START Gliacin once a day, if tolerating raise to 2 times per day, then 3 times per day (online at [email or web address])  -- START Emgality 300mg (3 x 100mg injections) monthly ([facility])    BRIDGE (to cool things down now before a new preventative is tried)  -- seek approval for inpatient infusions of:   Benadryl 25mg IV q8 hours   Compazine 10mg IV q8 hours   DHE 1mg IV q8 hours    Valium 10mg IV q8 hours   Solumedrol 125mg IV q6 hours    Possibly IV lidocaine   -- follow hospitalization with 3-6 month course of methergine     AS-NEEDED TREATMENT (use total no more than 10 days per month unless otherwise stated):  -- recommend significant reduction to Stadol use  -- Imitrex use is OK for now, will need to be stopped 24 hours prior to DHE and stopped completely while on methergine   -- Start Lidocaine nasal spray as needed for headache - may use 4 times per day, OK TO USE DAILY     INSTRUCTIONS FOR LIDOCAINE 4% NASAL SPRAY  1 spray into one or both nostrils up to 4 times per day as needed for headache    -- blow your nose to clear your nostrils   -- lay down with a pillow under your shoulders, or alternatively lay down with your head hanging off the edge of the bed so that your head is tilted back 45 degrees. Turn your head slightly to the side you will be spraying.   -- insert nasal applicator into affected side nostril   -- press down on the pump, letting the  medication drip into your nose   -- repeat to the other nostril, if needed  -- remain laying down on your back for 2 minutes   -- may use 4 times per day as needed, daily if needed     This medication will come from [facility]. They MUST speak with you before the medication is shipped to your home. If you miss their phone call, you may reach the pharmacy at [phone].

## 2019-10-24 NOTE — PROGRESS NOTES
Date of service: 10/24/2019  Referring provider: Aaareferral Self    Subjective:      Chief complaint: Headache       Patient ID: Keith Duane Crawford is a 49 y.o. gentleman with intractable cluster headaches presenting for a new patient evaluation    Previously saw Dr. Allen     History of Present Illness    ORIGINAL HEADACHE HISTORY - 10/24/2019  Age at onset and course over time:  The headaches began in 2001 with 2 clusters, which went away until 2009 but had new onset migraines in between, improved in frequency on topiramate for a while. Developed knee problems and was on various pain medications. In Nov 2009 had his 5th knee scope. Was recommended to wean off pain medications. Has had chronic cluster since Nov 2009.  Reverted at some point to episodic frequency on Botox however this lost efficacy after approximately a year or so.    He was in the [facility] galcanezumab study - helped with frequency and intensity of his cluster headaches    Location:  Left fronto/temporal / parietal  Quality:  [x] pressure [] tight [] throbbing [x] sharp [x] stabbing   Severity:  Current 3-4, at its best to, at its worst 9  Duration:  Sec, minutes, hours, days, constant  Frequency:  Daily, never pain-free, constant left hemicrania pain with escalations 2 dozen times per day with left ipsilateral autonomic features, rocks during escalations   Headaches awaken at night?:   3-4 nights per week  Worst time of day:  Mid day in the evening  Associated with: [x] photophobia [x]  phonophobia [] osmophobia [] blurred vision  [] double vision [x] loss of appetite [x] nausea [x] vomiting [x] dizziness [] vertigo  [] tinnitus [] irritability [x] sinus pressure [] problems with concentration   [] neck tightness   Alleviated by:  [] sleep [] darkness [] massage [] heat [] ice [x] medication  Exacerbated by:  [x] fatigue [x] light [] noise [] smells [] coughing [] sneezing  [] bending over [] ovulation [] menses [] alcohol [x] change in weather [x]   stress  Ipsilateral autonomic: [x] nasal congestion [x] lacrimation [] ptosis [] injection [] edema [] foreign body sensation [] ear fullness   ICP:  No transient visual obscurations, no tinnitus, no positional changes to headaches  Sleep habits:  He has trouble falling asleep, staying asleep, and non refreshing sleep he does have sleep apnea  Caffeine intake:  320 mg per day    Migraines - same location, more throbbing, sometimes bilateral     MIDAS 75 indicating severe disability  Hit 6 72    Current acute treatment:  Stadol nasal spray 4-6 sprays per day 5-6 days per week  Hydrocodone 10mg 2-3 per day 5-6 days per week  Reglan  Sumatriptan  Tizanidine    Current prevention:  Duloxetine 30 mg daily  Lisinopril    Previously tried/failed acute treatment:  Oxygen 15 L  Tylenol  Aspirin  Diclofenac  Ibuprofen  Indomethacin - unsure dose   Ketorolac  Aleve  Meloxicam  DHE infusions inpatient 3 days, some response, tolerates it OK   Axert  Relpax  Frova  Emerged  Maxalt  Treximet  Imitrex   Zomig  Fioricet  Excedrin  Infusions of - benadryl, phenergan, reglan, dilaudid, decadron - helpful for days     Previously tried/failed preventative treatment:  Lamotrigine  Trileptal  Lyrica  Topiramate  Keppra  Depakote  Valium  Xanax  Klonopin  Amitriptyline  Nortriptyline  Venlafaxine  Trazodone  Celexa  Lexapro  Gabapentin  Metoprolol  Bystolic  Propranolol  Verapamil  Gamma core  Cephaly  Occipital neurostimulation  Sphenopalatine ganglion block  Occipital nerve block  Ketamine infusion  Seroquel + depakote - caused first overdose  Lithium - developed toxicity   Botox - worked for about 1.5 years, chronic to episodic     No methergine, no gliacin     Review of patient's allergies indicates:  No Known Allergies  Current Outpatient Medications   Medication Sig Dispense Refill    amLODIPine (NORVASC) 5 MG tablet Take 5 mg by mouth once daily.      butorphanol (STADOL) 10 mg/mL nasal spray 1 spray.      DULoxetine (CYMBALTA)  30 MG capsule Take 30 mg by mouth 2 (two) times daily.       fenofibrate 160 MG Tab 160 mg.      HYDROcodone-acetaminophen (NORCO)  mg per tablet Take 1 tablet by mouth.      lisinopril-hydrochlorothiazide (PRINZIDE,ZESTORETIC) 20-12.5 mg per tablet 1 tablet.      metoclopramide HCl (REGLAN) 10 MG tablet Take 1 tablet (10 mg total) by mouth every 6 (six) hours as needed (headache). 30 tablet 0    predniSONE (DELTASONE) 20 MG tablet 3 PO daily X 2 days, then 2 PO daily X 2 days, then 1 PO daily X 2 days, then 1/2 PO daily X 2 days 17 tablet 0    sumatriptan (IMITREX STATDOSE) 6 mg/0.5 mL kit 1 injection onset migraine, second 2 hours later, no more than 2 per day/3 days use 6 mL 4    tiZANidine 4 mg Cap Take by mouth.      traZODone (DESYREL) 100 MG tablet Take 1 tablet every day by oral route at bedtime.      zolpidem (AMBIEN) 10 mg Tab Take 5 mg by mouth nightly as needed.      galcanezumab-gnlm (GALCANEZUMAB-GNLM) 300 mg/3 mL (100 mg/mL x 3) Syrg Inject 300 mg into the skin every 28 days. 3 mL 11    sumatriptan (IMITREX) 100 MG tablet Take 100 mg by mouth every 2 (two) hours as needed for Migraine.       Current Facility-Administered Medications   Medication Dose Route Frequency Provider Last Rate Last Dose    onabotulinumtoxina injection 200 Units  200 Units Intramuscular 1 time in Clinic/HOD Judson Orosco MD        onabotulinumtoxina injection 200 Units  200 Units Intramuscular 1 time in Clinic/HOD Judson Orosco MD           Past Medical History  Past Medical History:   Diagnosis Date    Arthritis     Back pain     Cluster headaches     Depression     HTN (hypertension)     Hyperlipidemia     PRISCILLA on CPAP        Past Surgical History  Past Surgical History:   Procedure Laterality Date    KNEE SURGERY         Family History  History reviewed. No pertinent family history.    Social History  Social History     Socioeconomic History    Marital status:      Spouse name:  Not on file    Number of children: Not on file    Years of education: Not on file    Highest education level: Not on file   Occupational History    Not on file   Social Needs    Financial resource strain: Not on file    Food insecurity:     Worry: Not on file     Inability: Not on file    Transportation needs:     Medical: Not on file     Non-medical: Not on file   Tobacco Use    Smoking status: Current Some Day Smoker     Packs/day: 1.00    Smokeless tobacco: Never Used   Substance and Sexual Activity    Alcohol use: No     Alcohol/week: 0.0 standard drinks    Drug use: No    Sexual activity: Not on file   Lifestyle    Physical activity:     Days per week: Not on file     Minutes per session: Not on file    Stress: Not on file   Relationships    Social connections:     Talks on phone: Not on file     Gets together: Not on file     Attends Gnosticism service: Not on file     Active member of club or organization: Not on file     Attends meetings of clubs or organizations: Not on file     Relationship status: Not on file   Other Topics Concern    Not on file   Social History Narrative    Not on file        Review of Systems  14-point review of systems as follows:   No check aime indicates NEGATIVE response   Constitutional: [] weight loss, [] change to appetite   Eyes: [] change in vision, [] double vision   Ears, nose, mouth, throat: [] frequent nose bleeds, [] ringing in the ears   Respiratory: [] cough, [] wheezing   Cardiovascular: [] chest pain, [] palpitations   Gastrointestinal: [] jaundice, [] nausea/vomiting   Genitourinary: [] incontinence, [] burning with urination   Hematologic/lymphatic: [] easy bruising/bleeding, [] night sweats   Neurological: [] numbness, [x] weakness   Endocrine: [] fatigue, [] heat/cold intolerance   Allergy/Immunologic: [] fevers, [] chills   Musculoskeletal: [x] muscle pain, [x] joint pain   Psychiatric: [] thoughts of harming self/others, [x] depression    Integumentary: [] rashes, [] sores that do not heal     Objective:        Vitals:    10/24/19 1536   BP: (!) 143/82   Pulse: 72   Resp: 17     Body mass index is 36.12 kg/m².    10/24/19   Constitutional: appears in no acute distress until he has a cluster attack in the clinic during which he appears very uncomfortable and restless, well-developed, well-nourished     Eyes: normal conjunctiva, PERRLA, optic discs are flat and sharp bilaterally     Ears, nose, mouth, throat: external appearance of ears and nose normal, hearing intact     Cardiovascular: regular rate and rhythm, no murmurs appreciated    Respiratory: unlabored respirations, breath sounds normal bilaterally    Gastrointestinal: no visible abdominal masses, no guarding, no visible hernia    Musculoskeletal: normal tone in all four extremities. No atrophy. No abnormal movements. No pronator drift. No orbit. Symmetric finger tapping. Normal gait and station. Digits and nails normal.      Spine:   CERVICAL SPINE:  ROM: normal   MUSCLE SPASM:  Mild throughout  FACET LOADING:  Bilateral  SPURLING: no  WINSOME / IGLESIA tender:  Bilateral    Psychiatric: normal judgment and insight. Oriented to person, place, and time.     Neurologic:   Cortical functions: recent and remote memory intact, normal attention span and concentration, speech fluent, adequate fund of knowledge   Cranial nerves: visual fields full, PERRLA, EOMI, symmetric facial strength, hearing intact, palate elevates symmetrically, shoulder shrug 5/5, tongue protrudes midline   Reflexes: 2+ in the upper and lower extremities, no Graf  Sensation: intact to temperature throughout   Coordination: normal finger to nose and tandem gait    Data Review:     I have personally reviewed the referring provider's notes, labs, & imaging made available to me today.      RADIOLOGY STUDIES:  I have personally reviewed the pertinent images performed.     No results found for this or any previous visit.    Lab Results    Component Value Date     10/23/2019    K 3.7 10/23/2019     10/23/2019    CO2 26 10/23/2019    BUN 12 10/23/2019    CREATININE 1.0 10/23/2019    GLU 80 10/23/2019    AST 28 10/23/2019    ALT 47 (H) 10/23/2019    ALBUMIN 5.0 10/23/2019    PROT 8.0 10/23/2019    BILITOT 0.5 10/23/2019       Lab Results   Component Value Date    WBC 10.55 10/23/2019    HGB 14.8 10/23/2019    HCT 44.0 10/23/2019    MCV 92 10/23/2019     (H) 10/23/2019       No results found for: TSH        Assessment & Plan:       Problem List Items Addressed This Visit        Neuro    Intractable episodic cluster headache - Primary    Overview     Initial 2001, much more frequent since 2009  Has trialed in failed an extensive list of as needed and preventative agents with short responses to for example Botox, occipital block, SPG block and infusions    Begin prevention with gliacin and Emgality  Disability at this point is severe and there is significant medication overuse especially to stadol thus I have little hope that any preventative will work without a VERY strong bridge regimen to cool down his current severity, for this reason I recommended inpatient admission for DHE with adjuncts, followed by a prolonged methergine taper.  May consider inpatient or outpatient IV lidocaine.  May consider sphenopalatine block.    Since the headaches are so incredibly refractory and he has not been imaged almost a decade, I do recommend a surveillance brain MRI to make sure we are not dealing with any type of secondary process         Relevant Medications    galcanezumab-gnlm (GALCANEZUMAB-GNLM) 300 mg/3 mL (100 mg/mL x 3) Syrg    Other Relevant Orders    MRI Brain W WO Contrast    Medication overuse headache    Overview     Stadol nasal spray 4-6 sprays per day 5-6 days per week  Hydrocodone 10mg 2-3 per day 5-6 days per week                   Please call our clinic at [phone] or send a message to Dr. House on the [employer] portal if  there are any changes to the plan described below, for example,if you are not contacted for the requested tests, referral(s) within one week, if you are unable to receive the medications prescribed, or if you feel you need to change the treatment course for any reason.     TESTING:  -- schedule brain MRI with and without contrast     REFERRALS:  -- none     PREVENTION (use daily regardless of headache):  -- continue duloxetine 30mg daily at this time  -- START Gliacin once a day, if tolerating raise to 2 times per day, then 3 times per day (online at [email or web address])  -- START Emgality 300mg (3 x 100mg injections) monthly ([facility])    BRIDGE (to cool things down now before a new preventative is tried)  -- seek approval for inpatient infusions of:   Benadryl 25mg IV q8 hours   Compazine 10mg IV q8 hours   DHE 1mg IV q8 hours    Valium 10mg IV q8 hours   Solumedrol 125mg IV q6 hours    Possibly IV lidocaine   -- follow hospitalization with 3-6 month course of methergine     AS-NEEDED TREATMENT (use total no more than 10 days per month unless otherwise stated):  -- recommend significant reduction to Stadol use  -- Imitrex use is OK for now, will need to be stopped 24 hours prior to DHE and stopped completely while on methergine   -- Start Lidocaine nasal spray as needed for headache - may use 4 times per day, OK TO USE DAILY     INSTRUCTIONS FOR LIDOCAINE 4% NASAL SPRAY  1 spray into one or both nostrils up to 4 times per day as needed for headache    -- blow your nose to clear your nostrils   -- lay down with a pillow under your shoulders, or alternatively lay down with your head hanging off the edge of the bed so that your head is tilted back 45 degrees. Turn your head slightly to the side you will be spraying.   -- insert nasal applicator into affected side nostril   -- press down on the pump, letting the medication drip into your nose   -- repeat to the other nostril, if needed  -- remain laying  down on your back for 2 minutes   -- may use 4 times per day as needed, daily if needed     This medication will come from [facility]. They MUST speak with you before the medication is shipped to your home. If you miss their phone call, you may reach the pharmacy at [phone].       Follow up for office visit after hospital .    Clau House MD

## 2019-10-28 ENCOUNTER — TELEPHONE (OUTPATIENT)
Dept: PHARMACY | Facility: CLINIC | Age: 49
End: 2019-10-28

## 2019-10-28 NOTE — TELEPHONE ENCOUNTER
Informed Patient  that [facility] received prescription for EMGALITY and prior authorization is required.  [facility] will be back in touch once insurance determination is received.

## 2019-10-30 ENCOUNTER — TELEPHONE (OUTPATIENT)
Dept: NEUROLOGY | Facility: CLINIC | Age: 49
End: 2019-10-30

## 2019-10-30 NOTE — TELEPHONE ENCOUNTER
Patient needs approval for 5 day inpatient hospital stay at [facility] for continuous infusion every 8 hours. Referral # [id number]. Please advise.

## 2019-11-06 NOTE — TELEPHONE ENCOUNTER
DOCUMENTATION ONLY:   Prior authorization for EMGALITY approved from 11/6/2019 to 12/31/2019.    Co-pay: $0    Patient Assistance IS NOT required.     Forward to clinical pharmacist for consult & shipment. FLC

## 2019-11-14 ENCOUNTER — TELEPHONE (OUTPATIENT)
Dept: NEUROLOGY | Facility: CLINIC | Age: 49
End: 2019-11-14

## 2019-11-14 NOTE — TELEPHONE ENCOUNTER
----- Message from Philippe Portillo sent at 11/14/2019  2:34 PM CST -----  Contact: Dr Peralta @ [phone]   Caller requesting a return call regarding a peer to peer for inpatient admission, valery burgos

## 2019-11-15 NOTE — TELEPHONE ENCOUNTER
Called the number above twice, sent to [employer]. Left my desk phone number. Will need to try again next week. Please schedule a reminder for both of us to have this message pop up on Monday, or, try to scheudle a peer to peer next week for me please

## 2019-11-15 NOTE — TELEPHONE ENCOUNTER
----- Message from Felicia Gracia sent at 11/15/2019  7:41 AM CST -----  Contact: Dr díaz contact # [phone]  Dr díaz requesting to do a peer to peer with Dr. House for a patient who was requested to be admitted but denied due to coverage.    Please call # [phone].

## 2019-11-18 ENCOUNTER — DOCUMENTATION ONLY (OUTPATIENT)
Dept: NEUROLOGY | Facility: CLINIC | Age: 49
End: 2019-11-18

## 2019-11-18 ENCOUNTER — TELEPHONE (OUTPATIENT)
Dept: NEUROLOGY | Facility: CLINIC | Age: 49
End: 2019-11-18

## 2019-11-18 NOTE — TELEPHONE ENCOUNTER
Called and spoke with Sandra with [employer] at [phone]. Peer to Peer was scheduled. Times given to do Peer to Peer were 12-1 pm and 2-2:30 pm. Sandra verbalized understanding.     Current request can not be overturned. We will either have to do an appeal or enter a new request.

## 2019-11-18 NOTE — PROGRESS NOTES
Peer to peer documentation     Spoke with radiologist Dr. Harris from [facility] [facility] (desk [phone]). Case for inpatient admission for DHE protocol denied but he said I can appeal it. He said he was not familiar with the treatments I ordered so he passed the case on to neurologist Dr. Peralta (subspeciality unknown to me) who tried to contact me last week and apparently closed out the case when I did not respond (I did, but was sent to [employer] twice without returned call). At this time I will provide a written appeal.       My documentation from last week was as follows -       Me   to Yamil Dobbins LPN       11:25 AM 11/15/19   Note      Called the number above twice, sent to [employer]. Left my desk phone number. Will need to try again next week. Please schedule a reminder for both of us to have this message pop up on Monday, or, try to scheudle a peer to peer next week for me please                 7:52 AM 11/15/19   Yamil Dobbins LPN routed this conversation to Me   Yamil Dobbins LPN           7:52 AM   Note      ----- Message from Felicia Gracia sent at 11/15/2019  7:41 AM CST -----  Contact: Dr peralta contact # [phone]  Dr peralta requesting to do a peer to peer with Dr. House for a patient who was requested to be admitted but denied due to coverage.    Please call # [phone].         November 14, 2019              2:41 PM   Yamil Dobbins LPN routed this conversation to Me   Yamil Dobbins LPN           2:41 PM 11/14/19   Note      ----- Message from Philippe Portillo sent at 11/14/2019  2:34 PM CST -----  Contact: Dr Peralta @ [phone]   Caller requesting a return call regarding a peer to peer for inpatient admission, valery calll

## 2019-11-20 ENCOUNTER — DOCUMENTATION ONLY (OUTPATIENT)
Dept: NEUROLOGY | Facility: CLINIC | Age: 49
End: 2019-11-20

## 2019-11-20 ENCOUNTER — TELEPHONE (OUTPATIENT)
Dept: NEUROLOGY | Facility: CLINIC | Age: 49
End: 2019-11-20

## 2019-11-20 NOTE — PROGRESS NOTES
Appeal letter for DHE admission written, nurse will [facility] as well as the pre service department and scan in the transmitted information    Please let patient know where we are in the appeal process     Clau House MD

## 2019-11-20 NOTE — TELEPHONE ENCOUNTER
""Appeal letter for [facility] admission written, nurse will fax over to [facility] as well as the pre service department and scan in the transmitted information     Please let patient know where we are in the appeal process      Clau House MD"      Appeal letter for [facility] admission plus office notes were faxed to [facility] at [phone]. Called and spoke with Ting at [facility]. She is unable to see it in the system at this time. Was instructed to call back tomorrow to see if it has been uploaded. Ting was unable to provide any further information. Will try and call again tomorrow.                                                   "

## 2019-11-21 ENCOUNTER — TELEPHONE (OUTPATIENT)
Dept: NEUROLOGY | Facility: CLINIC | Age: 49
End: 2019-11-21

## 2019-11-21 NOTE — TELEPHONE ENCOUNTER
Returned call and spoke with Yanni at [facility]. Appeal has been approved. She is just waiting for them to upload the approval number. Admit dates are 12/02/2019-12/06/2019. If dates need to be changed, we can call [phone].

## 2019-11-21 NOTE — TELEPHONE ENCOUNTER
----- Message from Felicia Gracia sent at 11/21/2019  9:28 AM CST -----  Contact: latasha with [employer] ph#[phone]   latasha with [employer] ph#[phone]   Please call concerning appeal that was submitted

## 2019-11-21 NOTE — TELEPHONE ENCOUNTER
Ok please contact the hospitalist beeper to make them aware, request bed on [address] for DHE protocol  Let them know hospitalist will be primary, I will be consultant for neuro orders, not the neuro-on call

## 2019-11-29 ENCOUNTER — HOSPITAL ENCOUNTER (OUTPATIENT)
Dept: RADIOLOGY | Facility: HOSPITAL | Age: 49
Discharge: HOME | End: 2019-11-29
Attending: PSYCHIATRY & NEUROLOGY
Payer: MEDICARE

## 2019-11-29 DIAGNOSIS — G44.011 INTRACTABLE EPISODIC CLUSTER HEADACHE: ICD-10-CM

## 2019-11-29 PROCEDURE — A9585 GADOBUTROL INJECTION: HCPCS | Mod: PO | Performed by: PSYCHIATRY & NEUROLOGY

## 2019-11-29 PROCEDURE — 70553 MRI BRAIN STEM W/O & W/DYE: CPT | Mod: TC,PO

## 2019-11-29 PROCEDURE — 70553 MRI BRAIN W WO CONTRAST: ICD-10-PCS | Mod: 26,,, | Performed by: RADIOLOGY

## 2019-11-29 PROCEDURE — 70553 MRI BRAIN STEM W/O & W/DYE: CPT | Mod: 26,,, | Performed by: RADIOLOGY

## 2019-11-29 PROCEDURE — 25500020 PHARM REV CODE 255: Mod: PO | Performed by: PSYCHIATRY & NEUROLOGY

## 2019-11-29 RX ORDER — GADOBUTROL 604.72 MG/ML
10 INJECTION INTRAVENOUS
Status: COMPLETED | OUTPATIENT
Start: 2019-11-29 | End: 2019-11-29

## 2019-11-29 RX ADMIN — GADOBUTROL 10 ML: 604.72 INJECTION INTRAVENOUS at 02:11

## 2019-12-02 VITALS
DIASTOLIC BLOOD PRESSURE: 97 MMHG | RESPIRATION RATE: 20 BRPM | HEART RATE: 107 BPM | OXYGEN SATURATION: 99 % | TEMPERATURE: 98 F | SYSTOLIC BLOOD PRESSURE: 143 MMHG

## 2019-12-02 VITALS
RESPIRATION RATE: 20 BRPM | SYSTOLIC BLOOD PRESSURE: 139 MMHG | TEMPERATURE: 98 F | DIASTOLIC BLOOD PRESSURE: 95 MMHG | HEART RATE: 94 BPM | OXYGEN SATURATION: 95 %

## 2019-12-02 VITALS
RESPIRATION RATE: 16 BRPM | TEMPERATURE: 99 F | OXYGEN SATURATION: 94 % | SYSTOLIC BLOOD PRESSURE: 125 MMHG | DIASTOLIC BLOOD PRESSURE: 75 MMHG | HEART RATE: 87 BPM

## 2019-12-02 VITALS
RESPIRATION RATE: 18 BRPM | SYSTOLIC BLOOD PRESSURE: 136 MMHG | OXYGEN SATURATION: 94 % | HEART RATE: 84 BPM | DIASTOLIC BLOOD PRESSURE: 75 MMHG

## 2019-12-02 PROBLEM — R51.9 INTRACTABLE HEADACHE: Status: ACTIVE | Noted: 2019-12-02

## 2019-12-02 PROBLEM — M19.90 OSTEOARTHRITIS: Status: ACTIVE | Noted: 2019-12-02

## 2019-12-02 PROBLEM — I10 ESSENTIAL HYPERTENSION: Status: ACTIVE | Noted: 2019-12-02

## 2019-12-03 VITALS
OXYGEN SATURATION: 95 % | RESPIRATION RATE: 18 BRPM | SYSTOLIC BLOOD PRESSURE: 130 MMHG | HEART RATE: 81 BPM | HEART RATE: 99 BPM | RESPIRATION RATE: 20 BRPM | DIASTOLIC BLOOD PRESSURE: 64 MMHG | OXYGEN SATURATION: 93 % | TEMPERATURE: 99 F | SYSTOLIC BLOOD PRESSURE: 116 MMHG | DIASTOLIC BLOOD PRESSURE: 72 MMHG | TEMPERATURE: 99 F

## 2019-12-03 VITALS
HEART RATE: 103 BPM | TEMPERATURE: 98 F | SYSTOLIC BLOOD PRESSURE: 126 MMHG | OXYGEN SATURATION: 96 % | DIASTOLIC BLOOD PRESSURE: 72 MMHG | RESPIRATION RATE: 18 BRPM

## 2019-12-03 VITALS
OXYGEN SATURATION: 92 % | TEMPERATURE: 99 F | HEART RATE: 88 BPM | SYSTOLIC BLOOD PRESSURE: 125 MMHG | DIASTOLIC BLOOD PRESSURE: 74 MMHG | RESPIRATION RATE: 16 BRPM

## 2019-12-03 VITALS
RESPIRATION RATE: 16 BRPM | OXYGEN SATURATION: 91 % | SYSTOLIC BLOOD PRESSURE: 109 MMHG | DIASTOLIC BLOOD PRESSURE: 66 MMHG | HEART RATE: 89 BPM | TEMPERATURE: 99 F

## 2019-12-03 VITALS
HEART RATE: 91 BPM | OXYGEN SATURATION: 96 % | SYSTOLIC BLOOD PRESSURE: 141 MMHG | RESPIRATION RATE: 18 BRPM | DIASTOLIC BLOOD PRESSURE: 78 MMHG | TEMPERATURE: 98 F

## 2019-12-04 ENCOUNTER — TELEPHONE (OUTPATIENT)
Dept: PHARMACY | Facility: CLINIC | Age: 49
End: 2019-12-04

## 2019-12-04 VITALS
DIASTOLIC BLOOD PRESSURE: 67 MMHG | SYSTOLIC BLOOD PRESSURE: 132 MMHG | HEART RATE: 99 BPM | OXYGEN SATURATION: 92 % | HEART RATE: 96 BPM | DIASTOLIC BLOOD PRESSURE: 66 MMHG | HEART RATE: 100 BPM | SYSTOLIC BLOOD PRESSURE: 131 MMHG | OXYGEN SATURATION: 92 % | OXYGEN SATURATION: 92 % | DIASTOLIC BLOOD PRESSURE: 72 MMHG | DIASTOLIC BLOOD PRESSURE: 66 MMHG | DIASTOLIC BLOOD PRESSURE: 62 MMHG | OXYGEN SATURATION: 91 % | DIASTOLIC BLOOD PRESSURE: 75 MMHG | HEART RATE: 92 BPM | OXYGEN SATURATION: 92 % | HEART RATE: 99 BPM | SYSTOLIC BLOOD PRESSURE: 128 MMHG | SYSTOLIC BLOOD PRESSURE: 119 MMHG | HEART RATE: 95 BPM | SYSTOLIC BLOOD PRESSURE: 116 MMHG | SYSTOLIC BLOOD PRESSURE: 116 MMHG | OXYGEN SATURATION: 93 %

## 2019-12-04 VITALS
RESPIRATION RATE: 16 BRPM | SYSTOLIC BLOOD PRESSURE: 120 MMHG | TEMPERATURE: 98 F | DIASTOLIC BLOOD PRESSURE: 67 MMHG | OXYGEN SATURATION: 97 % | HEART RATE: 75 BPM

## 2019-12-04 VITALS
TEMPERATURE: 98 F | SYSTOLIC BLOOD PRESSURE: 128 MMHG | OXYGEN SATURATION: 97 % | DIASTOLIC BLOOD PRESSURE: 66 MMHG | HEART RATE: 91 BPM | RESPIRATION RATE: 16 BRPM

## 2019-12-04 VITALS
OXYGEN SATURATION: 95 % | TEMPERATURE: 98 F | SYSTOLIC BLOOD PRESSURE: 125 MMHG | RESPIRATION RATE: 14 BRPM | HEART RATE: 94 BPM | DIASTOLIC BLOOD PRESSURE: 65 MMHG

## 2019-12-04 DIAGNOSIS — G44.011 INTRACTABLE EPISODIC CLUSTER HEADACHE: ICD-10-CM

## 2019-12-04 RX ORDER — METHYLERGONOVINE MALEATE 0.2 MG/1
TABLET ORAL
Qty: 98 TABLET | Refills: 0 | Status: SHIPPED | OUTPATIENT
Start: 2019-12-04 | End: 2020-02-17

## 2019-12-04 NOTE — TELEPHONE ENCOUNTER
The prior authorization for Dilan Duane Crawford's Methergine has been submitted on 12/4/19 @ 11:07 AM.      It can take up to 72 hours for a decision to be rendered from the insurance.      Patient is aware of PA and process.      Please let me know if you have any questions.    Thank You!   Liliana Gomez CPhT, B.A  Patient Care Advocate   [facility]  [email or web address]  Phone: [phone] Ext [phone]  Fax: [phone]

## 2019-12-05 ENCOUNTER — TELEPHONE (OUTPATIENT)
Dept: PHARMACY | Facility: CLINIC | Age: 49
End: 2019-12-05

## 2019-12-05 NOTE — TELEPHONE ENCOUNTER
Good Morning / Afternoon,     The prior authorization for Keith Duane Crawford's Methergine prescription has been APPROVED FROM 12/5/19 TO 12/31/19 with copayment of $0.00.       Patient has been notified of the decision on 12/5/19 and patient states he/she will  medication.    He would like to  at [facility] in [place], because it is closer to his house.  Will work with [facility] to make arrangements for patient to obtain medication.    If there are any additional questions or concerns, please contact me.    PA Information:  [employer]  [phone]    Thank You!   Liliana Gomez CPhT, B.A  Patient Care Advocate   [facility]  [email or web address]  Phone: [phone] Ext [phone]  Fax: [phone]

## 2019-12-06 ENCOUNTER — TELEPHONE (OUTPATIENT)
Dept: PHARMACY | Facility: CLINIC | Age: 49
End: 2019-12-06

## 2019-12-06 NOTE — TELEPHONE ENCOUNTER
Notified pt only 1 bottle arrived of Methergine, will have wife  today prior to his release for hospital at [facility].    Will reorder more for Monday.    Thank You!   Liliana Gomez CPhT, B.A  Patient Care Advocate   [facility]  [email or web address]  Phone: [phone] Ext [phone]  Fax: [phone]

## 2019-12-09 ENCOUNTER — TELEPHONE (OUTPATIENT)
Dept: PHARMACY | Facility: CLINIC | Age: 49
End: 2019-12-09

## 2019-12-09 NOTE — TELEPHONE ENCOUNTER
Initial Emgality consult completed on  . Emgality 100mg x3 will be shipped on  to arrive at patient's home on 12/10 via [employer]. $0 copay. Patient intends to start Emgality on 12/10. Address confirmed. Confirmed 2 patient identifiers - name and . Therapy Appropriate.    Patient participated in Emgality Cluster study so is existing to treatment. Patient declined admin consult.     Store in refrigerator prior to use (do not freeze, do not shake, keep in original box until use).    Counseled patient on administration directions:  - Inject (300 mg) comes in three (100 mg) prefilled syringes, which are taken one after the other at the start of a cluster period and then every month until the end of the cluster period.  - Patient will use sharps container; once full, per state law, she/he may securely lock the sharps container and place in trash. Pharmacy will replace the sharps at no additional charge.    Medication and Allergy list reviewed. Drug list up to date in Epic. No DDIs or drug-allergy concerns with Emgality.    Consultation included the importance of compliance and of keeping all follow up appointments.  Patient understands to report any medication changes to [facility] and provider. All questions answered and addressed to patients satisfaction. RPh will touchbase with pt in 7 days and [facility] will contact patient in 3 weeks for refills.    PPV complete. Therapy appropriate. NO DDI's with Emaglity. Cluster headache dose appropriate. Patient existing to therapy.

## 2020-01-03 ENCOUNTER — TELEPHONE (OUTPATIENT)
Dept: PHARMACY | Facility: CLINIC | Age: 50
End: 2020-01-03

## 2020-01-10 NOTE — TELEPHONE ENCOUNTER
RX call for Emgality cluster attempt 2 pt reached shipping out  with  arrival copay 8.95 charging CCOF: 6921 with pt consent @004. Pt stated he didn't need any supplies at this time. Address and  confirmed. Patient has 0 doses on hand at this time. Patient has not started any new medications, has had no missed doses and no side effects present. Patient is currently taking the medication as directed by doctors instruction Inject 300 mg into the skin every 28 days. Patient does have a safe place in their residence to keep medication at desired temperature away from small children and pets. Patient also does have the capability of contacting 911 in the event of an emergency. Patient states they do not have any questions or concerns at this time. DLR(training)

## 2020-02-05 ENCOUNTER — TELEPHONE (OUTPATIENT)
Dept: PHARMACY | Facility: CLINIC | Age: 50
End: 2020-02-05

## 2020-02-17 ENCOUNTER — HOSPITAL ENCOUNTER (EMERGENCY)
Facility: HOSPITAL | Age: 50
Discharge: HOME | End: 2020-02-17
Attending: EMERGENCY MEDICINE
Payer: MEDICARE

## 2020-02-17 VITALS
WEIGHT: 260.38 LBS | BODY MASS INDEX: 37.36 KG/M2 | HEART RATE: 98 BPM | DIASTOLIC BLOOD PRESSURE: 78 MMHG | TEMPERATURE: 98 F | OXYGEN SATURATION: 98 % | SYSTOLIC BLOOD PRESSURE: 142 MMHG | RESPIRATION RATE: 18 BRPM

## 2020-02-17 DIAGNOSIS — M76.61 TENDONITIS, ACHILLES, RIGHT: Primary | ICD-10-CM

## 2020-02-17 DIAGNOSIS — R51.9 LEFT-SIDED HEADACHE: ICD-10-CM

## 2020-02-17 PROCEDURE — 96372 THER/PROPH/DIAG INJ SC/IM: CPT | Mod: 59

## 2020-02-17 PROCEDURE — 63600175 PHARM REV CODE 636 W HCPCS: Performed by: NURSE PRACTITIONER

## 2020-02-17 PROCEDURE — 99284 EMERGENCY DEPT VISIT MOD MDM: CPT | Mod: 25

## 2020-02-17 RX ORDER — MORPHINE SULFATE 4 MG/ML
6 INJECTION, SOLUTION INTRAMUSCULAR; INTRAVENOUS
Status: COMPLETED | OUTPATIENT
Start: 2020-02-17 | End: 2020-02-17

## 2020-02-17 RX ORDER — ORPHENADRINE CITRATE 30 MG/ML
60 INJECTION INTRAMUSCULAR; INTRAVENOUS
Status: DISCONTINUED | OUTPATIENT
Start: 2020-02-17 | End: 2020-02-17

## 2020-02-17 RX ORDER — PROMETHAZINE HYDROCHLORIDE 25 MG/ML
12.5 INJECTION, SOLUTION INTRAMUSCULAR; INTRAVENOUS
Status: COMPLETED | OUTPATIENT
Start: 2020-02-17 | End: 2020-02-17

## 2020-02-17 RX ORDER — PREDNISONE 50 MG/1
50 TABLET ORAL DAILY
Qty: 3 TABLET | Refills: 0 | Status: SHIPPED | OUTPATIENT
Start: 2020-02-17 | End: 2020-02-20

## 2020-02-17 RX ORDER — KETOROLAC TROMETHAMINE 30 MG/ML
30 INJECTION, SOLUTION INTRAMUSCULAR; INTRAVENOUS
Status: DISCONTINUED | OUTPATIENT
Start: 2020-02-17 | End: 2020-02-17

## 2020-02-17 RX ORDER — DEXAMETHASONE SODIUM PHOSPHATE 4 MG/ML
8 INJECTION, SOLUTION INTRA-ARTICULAR; INTRALESIONAL; INTRAMUSCULAR; INTRAVENOUS; SOFT TISSUE
Status: COMPLETED | OUTPATIENT
Start: 2020-02-17 | End: 2020-02-17

## 2020-02-17 RX ADMIN — DEXAMETHASONE SODIUM PHOSPHATE 8 MG: 4 INJECTION, SOLUTION INTRA-ARTICULAR; INTRALESIONAL; INTRAMUSCULAR; INTRAVENOUS; SOFT TISSUE at 10:02

## 2020-02-17 RX ADMIN — MORPHINE SULFATE 6 MG: 4 INJECTION, SOLUTION INTRAMUSCULAR; INTRAVENOUS at 10:02

## 2020-02-17 RX ADMIN — PROMETHAZINE HYDROCHLORIDE 12.5 MG: 25 INJECTION INTRAMUSCULAR; INTRAVENOUS at 10:02

## 2020-02-17 NOTE — ED PROVIDER NOTES
Encounter Date: 2/17/2020       History     Chief Complaint   Patient presents with    Ankle Pain     pain to right ankle began 6 days ago; warm and tender to the touch, swelling     49 year old male with complaint of right ankle pain X 1 week.  Constant aching pain that is worse with movement.  No fall. No trauma.  No fever or chills.  Mild relief with rest of foot and ankle.  Pt reports no relief with rest of foot.  Also reports left sided headache X 3-4 days.  History of migraines and reports feels like migraine.         Review of patient's allergies indicates:  No Known Allergies  Past Medical History:   Diagnosis Date    Arthritis     Back pain     Cluster headaches     Depression     HTN (hypertension)     Hyperlipidemia     PRISCILLA on CPAP      Past Surgical History:   Procedure Laterality Date    KNEE SURGERY       History reviewed. No pertinent family history.  Social History     Tobacco Use    Smoking status: Current Some Day Smoker     Packs/day: 1.00    Smokeless tobacco: Never Used   Substance Use Topics    Alcohol use: No     Alcohol/week: 0.0 standard drinks    Drug use: No     Review of Systems   Constitutional: Negative for fever.   HENT: Negative for sore throat.    Respiratory: Negative for shortness of breath.    Cardiovascular: Negative for chest pain.   Gastrointestinal: Negative for nausea.   Genitourinary: Negative for dysuria.   Musculoskeletal: Negative for back pain.        Right foot pain    Skin: Negative for rash.   Neurological: Negative for weakness.   Hematological: Does not bruise/bleed easily.       Physical Exam     Initial Vitals [02/17/20 0954]   BP Pulse Resp Temp SpO2   (!) 150/81 105 16 98.3 °F (36.8 °C) 95 %      MAP       --         Physical Exam    Nursing note and vitals reviewed.  Constitutional: He appears well-developed and well-nourished.   HENT:   Head: Normocephalic and atraumatic.   Eyes: Conjunctivae are normal. Pupils are equal, round, and reactive to  light.   Neck: Normal range of motion. Neck supple.   Cardiovascular: Normal rate, regular rhythm, normal heart sounds and intact distal pulses.   Pulmonary/Chest: Breath sounds normal.   Abdominal: Soft. There is no rebound and no guarding.   Musculoskeletal: Normal range of motion.   Tenderness right achilles tendon insertion site,no erythema, no ankle or foot swelling or tenderness, pain with ROM of right foot, 2+ right dorsalis pedis pulse   Neurological: He is alert.   Skin: Skin is warm and dry.   Psychiatric: He has a normal mood and affect. His behavior is normal. Thought content normal.         ED Course   Procedures  Labs Reviewed - No data to display       Imaging Results    None                                          Clinical Impression:       ICD-10-CM ICD-9-CM   1. Tendonitis, Achilles, right M76.61 726.71   2. Left-sided headache R51 784.0                             Kirk Carr NP  02/17/20 1014

## 2020-03-01 ENCOUNTER — HOSPITAL ENCOUNTER (EMERGENCY)
Facility: HOSPITAL | Age: 50
Discharge: HOME | End: 2020-03-01
Attending: EMERGENCY MEDICINE
Payer: MEDICARE

## 2020-03-01 VITALS
HEART RATE: 81 BPM | TEMPERATURE: 99 F | SYSTOLIC BLOOD PRESSURE: 131 MMHG | WEIGHT: 249.25 LBS | OXYGEN SATURATION: 98 % | BODY MASS INDEX: 35.76 KG/M2 | DIASTOLIC BLOOD PRESSURE: 84 MMHG | RESPIRATION RATE: 18 BRPM

## 2020-03-01 DIAGNOSIS — R51.9 LEFT-SIDED HEADACHE: Primary | ICD-10-CM

## 2020-03-01 PROCEDURE — 99284 EMERGENCY DEPT VISIT MOD MDM: CPT | Mod: 25

## 2020-03-01 PROCEDURE — 96374 THER/PROPH/DIAG INJ IV PUSH: CPT

## 2020-03-01 PROCEDURE — 96375 TX/PRO/DX INJ NEW DRUG ADDON: CPT

## 2020-03-01 PROCEDURE — 63600175 PHARM REV CODE 636 W HCPCS: Performed by: NURSE PRACTITIONER

## 2020-03-01 PROCEDURE — 96361 HYDRATE IV INFUSION ADD-ON: CPT

## 2020-03-01 RX ORDER — TIZANIDINE 4 MG/1
TABLET ORAL
Status: ON HOLD | COMMUNITY
Start: 2020-01-20 | End: 2020-09-07 | Stop reason: CLARIF

## 2020-03-01 RX ORDER — ACETAMINOPHEN 500 MG
500 TABLET ORAL EVERY 6 HOURS PRN
Status: ON HOLD | COMMUNITY
End: 2020-09-07 | Stop reason: CLARIF

## 2020-03-01 RX ORDER — NAPROXEN SODIUM 220 MG
220 TABLET ORAL
Status: ON HOLD | COMMUNITY
End: 2020-09-07 | Stop reason: CLARIF

## 2020-03-01 RX ORDER — KETOROLAC TROMETHAMINE 30 MG/ML
15 INJECTION, SOLUTION INTRAMUSCULAR; INTRAVENOUS
Status: COMPLETED | OUTPATIENT
Start: 2020-03-01 | End: 2020-03-01

## 2020-03-01 RX ORDER — MORPHINE SULFATE 4 MG/ML
4 INJECTION, SOLUTION INTRAMUSCULAR; INTRAVENOUS
Status: COMPLETED | OUTPATIENT
Start: 2020-03-01 | End: 2020-03-01

## 2020-03-01 RX ORDER — MORPHINE SULFATE 15 MG/1
TABLET ORAL
Status: ON HOLD | COMMUNITY
Start: 2020-02-03 | End: 2020-09-07 | Stop reason: CLARIF

## 2020-03-01 RX ORDER — METOCLOPRAMIDE HYDROCHLORIDE 5 MG/ML
10 INJECTION INTRAMUSCULAR; INTRAVENOUS
Status: COMPLETED | OUTPATIENT
Start: 2020-03-01 | End: 2020-03-01

## 2020-03-01 RX ORDER — SODIUM CHLORIDE 9 MG/ML
1000 INJECTION, SOLUTION INTRAVENOUS
Status: COMPLETED | OUTPATIENT
Start: 2020-03-01 | End: 2020-03-01

## 2020-03-01 RX ORDER — SUMATRIPTAN SUCCINATE 6 MG/.5ML
6 INJECTION, SOLUTION SUBCUTANEOUS
COMMUNITY
End: 2020-11-18 | Stop reason: SDUPTHER

## 2020-03-01 RX ORDER — DIPHENHYDRAMINE HYDROCHLORIDE 50 MG/ML
12.5 INJECTION INTRAMUSCULAR; INTRAVENOUS
Status: COMPLETED | OUTPATIENT
Start: 2020-03-01 | End: 2020-03-01

## 2020-03-01 RX ORDER — BUTORPHANOL TARTRATE 10 MG/ML
1 SPRAY NASAL EVERY 4 HOURS PRN
Status: ON HOLD | COMMUNITY
End: 2020-09-07 | Stop reason: CLARIF

## 2020-03-01 RX ADMIN — METOCLOPRAMIDE 10 MG: 5 INJECTION, SOLUTION INTRAMUSCULAR; INTRAVENOUS at 01:03

## 2020-03-01 RX ADMIN — DIPHENHYDRAMINE HYDROCHLORIDE 12.5 MG: 50 INJECTION, SOLUTION INTRAMUSCULAR; INTRAVENOUS at 01:03

## 2020-03-01 RX ADMIN — SODIUM CHLORIDE 1000 ML: 0.9 INJECTION, SOLUTION INTRAVENOUS at 12:03

## 2020-03-01 RX ADMIN — KETOROLAC TROMETHAMINE 15 MG: 30 INJECTION, SOLUTION INTRAMUSCULAR; INTRAVENOUS at 01:03

## 2020-03-01 RX ADMIN — MORPHINE SULFATE 4 MG: 4 INJECTION, SOLUTION INTRAMUSCULAR; INTRAVENOUS at 01:03

## 2020-03-01 NOTE — ED PROVIDER NOTES
"Encounter Date: 3/1/2020       History     Chief Complaint   Patient presents with    Headache     pt c/o "cluster headache attack" x 90 minutes, no relief with home medication     49 year old male with complaint of left sided headache X 2 hours.  History of cluster headaches and pain today feels like pain in past. Moderate nausea.  NO relief with Imitrex.  Pt out of stadol nasal spray.  No neck pain.  No other complaints.         Review of patient's allergies indicates:  No Known Allergies  Past Medical History:   Diagnosis Date    Arthritis     Back pain     Cluster headaches     Depression     HTN (hypertension)     Hyperlipidemia     PRISCILLA on CPAP      Past Surgical History:   Procedure Laterality Date    KNEE SURGERY       History reviewed. No pertinent family history.  Social History     Tobacco Use    Smoking status: Current Some Day Smoker     Packs/day: 1.00    Smokeless tobacco: Never Used   Substance Use Topics    Alcohol use: No     Alcohol/week: 0.0 standard drinks    Drug use: No     Review of Systems   Constitutional: Negative for fever.   HENT: Negative for sore throat.    Respiratory: Negative for shortness of breath.    Cardiovascular: Negative for chest pain.   Gastrointestinal: Negative for nausea.   Genitourinary: Negative for dysuria.   Musculoskeletal: Negative for back pain.   Skin: Negative for rash.   Neurological: Positive for headaches. Negative for weakness.   Hematological: Does not bruise/bleed easily.       Physical Exam     Initial Vitals [03/01/20 1209]   BP Pulse Resp Temp SpO2   (!) 170/103 110 20 98.9 °F (37.2 °C) 98 %      MAP       --         Physical Exam    Nursing note and vitals reviewed.  Constitutional: He appears well-developed and well-nourished.   HENT:   Head: Normocephalic and atraumatic.   Eyes: Conjunctivae are normal. Pupils are equal, round, and reactive to light.   Neck: Normal range of motion. Neck supple.   Cardiovascular: Normal rate, regular " rhythm, normal heart sounds and intact distal pulses.   Pulmonary/Chest: Breath sounds normal.   Abdominal: Soft. There is no rebound and no guarding.   Musculoskeletal: Normal range of motion.   Neurological: He is alert.   Skin: Skin is warm and dry.   Psychiatric: He has a normal mood and affect. His behavior is normal. Thought content normal.         ED Course   Procedures  Labs Reviewed - No data to display       Imaging Results    None                                          Clinical Impression:       ICD-10-CM ICD-9-CM   1. Left-sided headache R51 784.0             ED Disposition Condition    Discharge Stable        ED Prescriptions     None        Follow-up Information     Follow up With Specialties Details Why Contact Info    Judson THORNE Caro, MD Family Medicine Schedule an appointment as soon as possible for a visit in 2 days  [address] [facility] [address]  [phone]                                       Kirk Carr NP  03/01/20 0792

## 2020-03-01 NOTE — ED NOTES
Pt c/o cluster headache with photosensitivity - onset PTA. Pain rated 7/10. Denies fever, n/v/d, CP, SOB, weakness, vision changes, or any other symptoms at this time.     Patient identifiers verified and correct for Keith Duane Crawford.    LOC: The patient is awake, alert and aware of environment with an appropriate affect, the patient is oriented x 3 and speaking appropriately.  APPEARANCE: Patient restless, rocking back-and-fourth in bed, wearing sunglasses, patient is clean and well groomed, patient's clothing is properly fastened.  SKIN: The skin is warm and dry, color consistent with ethnicity, patient has normal skin turgor and moist mucus membranes, skin intact, no breakdown or bruising noted.  MUSCULOSKELETAL: Patient moving all extremities spontaneously.  RESPIRATORY: Airway is open and patent, respirations are spontaneous.  CARDIAC: Patient tachycardic, no peripheral edema noted, capillary refill < 3 seconds.  ABDOMEN: Soft and non tender to palpation.

## 2020-03-05 ENCOUNTER — TELEPHONE (OUTPATIENT)
Dept: PHARMACY | Facility: CLINIC | Age: 50
End: 2020-03-05

## 2020-03-30 NOTE — TELEPHONE ENCOUNTER
DOCUMENTATION ONLY:  Prior authorization for Emgality approved from 03/27/20 to 12/31/20    Case Id: [id number]    Co-pay: $8.95    Patient Assistance IS NOT required

## 2020-04-03 ENCOUNTER — TELEPHONE (OUTPATIENT)
Dept: PHARMACY | Facility: CLINIC | Age: 50
End: 2020-04-03

## 2020-04-03 NOTE — TELEPHONE ENCOUNTER
Refill call regarding Emgality at [facility]. Will prepare for shipment with consent of patient on  to arrive 4/10. Pt stated next injection will be . Copay $8.95 [facility]. Patient has not started any new medications including OTC drugs. Patient has not had any medication/ dose or instruction changes. No new allergies or side effects reported with this shipment. Medication is being taken as prescribed by physician and properly stored. Two patient identifiers:  and Address verified. Patient has no questions or concerns for [facility].

## 2020-05-04 ENCOUNTER — TELEPHONE (OUTPATIENT)
Dept: PHARMACY | Facility: CLINIC | Age: 50
End: 2020-05-04

## 2020-06-02 ENCOUNTER — TELEPHONE (OUTPATIENT)
Dept: PHARMACY | Facility: CLINIC | Age: 50
End: 2020-06-02

## 2020-07-06 ENCOUNTER — TELEPHONE (OUTPATIENT)
Dept: PHARMACY | Facility: CLINIC | Age: 50
End: 2020-07-06

## 2020-07-19 ENCOUNTER — HOSPITAL ENCOUNTER (EMERGENCY)
Facility: HOSPITAL | Age: 50
Discharge: HOME | End: 2020-07-19
Attending: EMERGENCY MEDICINE
Payer: MEDICARE

## 2020-07-19 VITALS
SYSTOLIC BLOOD PRESSURE: 139 MMHG | BODY MASS INDEX: 36.06 KG/M2 | DIASTOLIC BLOOD PRESSURE: 78 MMHG | WEIGHT: 251.88 LBS | HEIGHT: 70 IN | HEART RATE: 95 BPM | RESPIRATION RATE: 18 BRPM | OXYGEN SATURATION: 98 % | TEMPERATURE: 99 F

## 2020-07-19 DIAGNOSIS — G44.019 EPISODIC CLUSTER HEADACHE, NOT INTRACTABLE: Primary | ICD-10-CM

## 2020-07-19 PROCEDURE — 99284 EMERGENCY DEPT VISIT MOD MDM: CPT | Mod: 25

## 2020-07-19 PROCEDURE — 96375 TX/PRO/DX INJ NEW DRUG ADDON: CPT

## 2020-07-19 PROCEDURE — 96374 THER/PROPH/DIAG INJ IV PUSH: CPT

## 2020-07-19 PROCEDURE — 96361 HYDRATE IV INFUSION ADD-ON: CPT

## 2020-07-19 PROCEDURE — 25000003 PHARM REV CODE 250: Performed by: NURSE PRACTITIONER

## 2020-07-19 PROCEDURE — 63600175 PHARM REV CODE 636 W HCPCS: Performed by: NURSE PRACTITIONER

## 2020-07-19 RX ORDER — BUTALBITAL, ACETAMINOPHEN AND CAFFEINE 50; 325; 40 MG/1; MG/1; MG/1
1 TABLET ORAL EVERY 4 HOURS PRN
Qty: 20 TABLET | Refills: 0 | Status: SHIPPED | OUTPATIENT
Start: 2020-07-19 | End: 2020-08-18

## 2020-07-19 RX ORDER — METOCLOPRAMIDE HYDROCHLORIDE 5 MG/ML
10 INJECTION INTRAMUSCULAR; INTRAVENOUS
Status: COMPLETED | OUTPATIENT
Start: 2020-07-19 | End: 2020-07-19

## 2020-07-19 RX ORDER — HYDROCODONE BITARTRATE AND ACETAMINOPHEN 10; 325 MG/1; MG/1
1 TABLET ORAL
Status: COMPLETED | OUTPATIENT
Start: 2020-07-19 | End: 2020-07-19

## 2020-07-19 RX ORDER — DIPHENHYDRAMINE HYDROCHLORIDE 50 MG/ML
25 INJECTION INTRAMUSCULAR; INTRAVENOUS
Status: COMPLETED | OUTPATIENT
Start: 2020-07-19 | End: 2020-07-19

## 2020-07-19 RX ORDER — BUTALBITAL, ACETAMINOPHEN AND CAFFEINE 50; 325; 40 MG/1; MG/1; MG/1
2 TABLET ORAL
Status: COMPLETED | OUTPATIENT
Start: 2020-07-19 | End: 2020-07-19

## 2020-07-19 RX ORDER — KETOROLAC TROMETHAMINE 30 MG/ML
30 INJECTION, SOLUTION INTRAMUSCULAR; INTRAVENOUS
Status: COMPLETED | OUTPATIENT
Start: 2020-07-19 | End: 2020-07-19

## 2020-07-19 RX ADMIN — HYDROCODONE BITARTRATE AND ACETAMINOPHEN 1 TABLET: 10; 325 TABLET ORAL at 08:07

## 2020-07-19 RX ADMIN — SODIUM CHLORIDE 1000 ML: 0.9 INJECTION, SOLUTION INTRAVENOUS at 06:07

## 2020-07-19 RX ADMIN — KETOROLAC TROMETHAMINE 30 MG: 30 INJECTION, SOLUTION INTRAMUSCULAR at 06:07

## 2020-07-19 RX ADMIN — BUTALBITAL, ACETAMINOPHEN, AND CAFFEINE 2 TABLET: 50; 325; 40 TABLET ORAL at 06:07

## 2020-07-19 RX ADMIN — METOCLOPRAMIDE 10 MG: 5 INJECTION, SOLUTION INTRAMUSCULAR; INTRAVENOUS at 06:07

## 2020-07-19 RX ADMIN — DIPHENHYDRAMINE HYDROCHLORIDE 25 MG: 50 INJECTION, SOLUTION INTRAMUSCULAR; INTRAVENOUS at 06:07

## 2020-07-20 NOTE — ED PROVIDER NOTES
"     HISTORY     Chief Complaint   Patient presents with    Headache     PT states, "I have had a headache for 3 days."     Review of patient's allergies indicates:  No Known Allergies     HPI   The history is provided by the patient.   Headache   This is a recurrent problem. Episode onset: 10 years ago  The problem occurs intermittently. The problem has been waxing and waning. The pain is located in the left unilateral region. The pain does not radiate. The pain quality is similar to prior headaches. The quality of the pain is described as sharp. The pain is at a severity of 8/10. Associated symptoms include eye pain. Pertinent negatives include no back pain, fever, nausea, sore throat or weakness. The symptoms are aggravated by unknown. He has tried NSAIDs (imitrex ) for the symptoms. The treatment provided mild relief. His past medical history is significant for cluster headaches.        PCP: Judson AGUSTIN Caro, MD     Past Medical History:  Past Medical History:   Diagnosis Date    Arthritis     Back pain     Cluster headaches     Depression     HTN (hypertension)     Hyperlipidemia     PRISCILLA on CPAP         Past Surgical History:  Past Surgical History:   Procedure Laterality Date    KNEE SURGERY          Family History:  No family history on file.     Social History:  Social History     Tobacco Use    Smoking status: Current Some Day Smoker     Packs/day: 1.00    Smokeless tobacco: Never Used   Substance and Sexual Activity    Alcohol use: No     Alcohol/week: 0.0 standard drinks    Drug use: No    Sexual activity: Not on file         ROS   Review of Systems   Constitutional: Negative for fever.   HENT: Negative for sore throat.    Eyes: Positive for pain.   Respiratory: Negative for shortness of breath.    Cardiovascular: Negative for chest pain.   Gastrointestinal: Negative for nausea.   Genitourinary: Negative for dysuria.   Musculoskeletal: Negative for back pain.   Skin: Negative for rash. " "  Neurological: Positive for headaches. Negative for weakness.   Hematological: Does not bruise/bleed easily.       PHYSICAL EXAM     Initial Vitals [07/19/20 1725]   BP Pulse Resp Temp SpO2   139/78 95 20 98.5 °F (36.9 °C) 98 %      MAP       --           Physical Exam    Constitutional: He appears well-developed and well-nourished. He is Obese . No distress.   HENT:   Head: Normocephalic and atraumatic.   Eyes: Conjunctivae are normal. Pupils are equal, round, and reactive to light.   Neck: Normal range of motion. Neck supple. Brudzinski's sign and Kernig's sign noted.   Cardiovascular: Normal rate, regular rhythm and normal heart sounds.   Pulmonary/Chest: Breath sounds normal.   Abdominal: Soft. Bowel sounds are normal.   Musculoskeletal: Normal range of motion.   Neurological: He is alert and oriented to person, place, and time. No cranial nerve deficit. He displays a negative Romberg sign. Coordination and gait normal.   Skin: Skin is warm and dry.   Psychiatric: He has a normal mood and affect.          ED COURSE   Procedures  ED ONGOING VITALS:  Vitals:    07/19/20 1725 07/19/20 2024   BP: 139/78    Pulse: 95    Resp: 20 18   Temp: 98.5 °F (36.9 °C)    TempSrc: Oral    SpO2: 98%    Weight: 114.3 kg (251 lb 14 oz)    Height: 5' 10" (1.778 m)          ABNORMAL LAB VALUES:  Labs Reviewed - No data to display      ALL LAB VALUES:      RADIOLOGY STUDIES:  Imaging Results    None                   The above vital signs and test results have been reviewed by the emergency provider.     ED Medications:  Discharge Medication List as of 7/19/2020  8:04 PM      START taking these medications    Details   butalbital-acetaminophen-caffeine -40 mg (FIORICET, ESGIC) -40 mg per tablet Take 1 tablet by mouth every 4 (four) hours as needed for Pain., Starting Sun 7/19/2020, Until Tue 8/18/2020, Print           Discharge Medications:  Discharge Medication List as of 7/19/2020  8:04 PM      START taking these " medications    Details   butalbital-acetaminophen-caffeine -40 mg (FIORICET, ESGIC) -40 mg per tablet Take 1 tablet by mouth every 4 (four) hours as needed for Pain., Starting Sun 7/19/2020, Until Tue 8/18/2020, Print            Follow-up Information     Schedule an appointment as soon as possible for a visit  with Judson AGUSTIN Caro, MD.    Specialty: Family Medicine  Contact information:  [place] [address]  [phone]             [facility]    Specialty: Emergency Medicine  Why: As needed, If symptoms worsen  Contact information:  [address]  [facility] [address] [phone]                I discussed with patient and/or family/caretaker that evaluation in the ED does not suggest any emergent or life threatening medical conditions requiring immediate intervention beyond what was provided in the ED, and I believe patient is safe for discharge. Regardless, an unremarkable evaluation in the ED does not preclude the development or presence of a serious or life threatening condition. As such, patient was instructed to return immediately for any worsening or change in current symptoms.    Patient's headache is consistent with previous headaches and lacks features concerning for emergent or life threatening condition.  I do not suspect SAH, meningitis, increased IC pressure, infectious, toxic, vascular, CNS, or other EMC.  I have discussed this at length with patient.  Regarding treatment, advised patient to take nonsteroidal antiinflammatory medications, acetaminophen, or any medications prescribed as instructed.  To prevent headaches, patient advised to avoid muscle tension (do not stay in one position for long periods of time), avoid eye strain (make sure there is adequate lighting for reading and routine tasks), eat healthy foods, exercise, and do not smoke or drink excessive alcohol.  Patient also advised to avoid overuse of over-the-counter  or prescription medications. Patient was instructed to contact primary healthcare provider if: headaches continue to get worse; occur often enough that they affect daily work or normal activities; frequent medication use is needed to manage headaches; headaches that worsen and cause vomiting; or there are any questions or concerns about the condition or care. Advised patient to return to the emergency department or call 911 if they develop a sudden headache that presents suddenly and much worse than usual headaches; have difficulty seeing, speaking, or moving; become confused or have seizure activity; or develop a fever and stiff neck with the headache.           MEDICAL DECISION MAKING                 CLINICAL IMPRESSION       ICD-10-CM ICD-9-CM   1. Episodic cluster headache, not intractable  G44.019 339.01       Disposition:   Disposition: Discharged  Condition: Stable         Jason Wilson NP  07/20/20 0041

## 2020-08-03 ENCOUNTER — TELEPHONE (OUTPATIENT)
Dept: PHARMACY | Facility: CLINIC | Age: 50
End: 2020-08-03

## 2020-08-05 ENCOUNTER — HOSPITAL ENCOUNTER (EMERGENCY)
Facility: HOSPITAL | Age: 50
Discharge: LEFT BEFORE DISCHARGE | End: 2020-08-05
Attending: EMERGENCY MEDICINE
Payer: MEDICARE

## 2020-08-05 ENCOUNTER — PATIENT MESSAGE (OUTPATIENT)
Dept: NEUROLOGY | Facility: CLINIC | Age: 50
End: 2020-08-05

## 2020-08-05 VITALS
WEIGHT: 246.56 LBS | HEIGHT: 70 IN | DIASTOLIC BLOOD PRESSURE: 112 MMHG | OXYGEN SATURATION: 98 % | RESPIRATION RATE: 16 BRPM | TEMPERATURE: 98 F | BODY MASS INDEX: 35.3 KG/M2 | SYSTOLIC BLOOD PRESSURE: 177 MMHG | HEART RATE: 80 BPM

## 2020-08-05 DIAGNOSIS — I10 ESSENTIAL HYPERTENSION: ICD-10-CM

## 2020-08-05 DIAGNOSIS — R51.9 NONINTRACTABLE EPISODIC HEADACHE, UNSPECIFIED HEADACHE TYPE: Primary | ICD-10-CM

## 2020-08-05 PROCEDURE — 99281 EMR DPT VST MAYX REQ PHY/QHP: CPT

## 2020-08-05 RX ORDER — DEXAMETHASONE SODIUM PHOSPHATE 4 MG/ML
8 INJECTION, SOLUTION INTRA-ARTICULAR; INTRALESIONAL; INTRAMUSCULAR; INTRAVENOUS; SOFT TISSUE
Status: DISCONTINUED | OUTPATIENT
Start: 2020-08-05 | End: 2020-08-05 | Stop reason: HOSPADM

## 2020-08-05 RX ORDER — PROMETHAZINE HYDROCHLORIDE 25 MG/ML
25 INJECTION, SOLUTION INTRAMUSCULAR; INTRAVENOUS
Status: DISCONTINUED | OUTPATIENT
Start: 2020-08-05 | End: 2020-08-05 | Stop reason: HOSPADM

## 2020-08-05 RX ORDER — KETOROLAC TROMETHAMINE 30 MG/ML
30 INJECTION, SOLUTION INTRAMUSCULAR; INTRAVENOUS
Status: DISCONTINUED | OUTPATIENT
Start: 2020-08-05 | End: 2020-08-05 | Stop reason: HOSPADM

## 2020-08-05 NOTE — ED NOTES
"Pt eloped. I went in room to give pt the medicine. Pt asked what the medicine was and asked if it was through an IV and requested to have stronger pain medicine. I informed the pt of the medicine and route ordered by the MD. Pt stated " Fuck that im out that shit didn't work last time".  "

## 2020-08-05 NOTE — ED PROVIDER NOTES
"SCRIBE #1 NOTE: I, Kelsi Oro, am scribing for, and in the presence of, Max Dhillon MD. I have scribed the entire note.       History     Chief Complaint   Patient presents with    Headache     pt reports " I have a cluster headache x 2 days, pt states hx of cluster headaches". Pt reports blurry vision, light sensitivity in left eye. Pt reports left sided is worse than right. Pt states he is also having bilateral lower back pain     Review of patient's allergies indicates:  No Known Allergies      History of Present Illness     HPI    8/5/2020, 4:13 AM  History obtained from the patient      History of Present Illness: Keith Duane Crawford is a 49 y.o. male patient with a PMHx of chronic back pain, cluster HA, arthritis, HTN, and HLD who presents to the Emergency Department for evaluation of cluster HA which onset gradually 2 days ago. Symptoms are constant and moderate in severity. No mitigating or exacerbating factors reported. Associated sxs include bilateral lower back pain, blurred vision bilaterally (worse in L eye), photophobia in L eye, and congestion. Patient denies any fever chills, neck pain/stiffness, dizziness, n/v/d, numbness/weakness, abd pain, dysuria, and all other sxs at this time. Prior Tx includes OxyContin for chronic back pain. Pt states that chronic back pain is a trigger for his cluster HA. No further complaints or concerns at this time.       Arrival mode: Personal vehicle    PCP: Judson AGUSTIN Caro, MD        Past Medical History:  Past Medical History:   Diagnosis Date    Arthritis     Back pain     Cluster headaches     Depression     HTN (hypertension)     Hyperlipidemia     PRISCILLA on CPAP        Past Surgical History:  Past Surgical History:   Procedure Laterality Date    KNEE SURGERY           Family History:  History reviewed. No pertinent family history.     Social History:  Social History     Tobacco Use    Smoking status: Current Some Day Smoker     Packs/day: 1.00    " Smokeless tobacco: Never Used   Substance and Sexual Activity    Alcohol use: No     Alcohol/week: 0.0 standard drinks    Drug use: No    Sexual activity: Unknown        Review of Systems     Review of Systems   Constitutional: Negative for chills and fever.   HENT: Positive for congestion. Negative for sore throat.    Eyes: Positive for photophobia (L eye) and visual disturbance (blurred bilaterally, worse in L eye).   Respiratory: Negative for shortness of breath.    Cardiovascular: Negative for chest pain.   Gastrointestinal: Negative for abdominal pain, diarrhea, nausea and vomiting.   Genitourinary: Negative for dysuria.   Musculoskeletal: Positive for back pain (BL lower). Negative for neck pain and neck stiffness.   Skin: Negative for rash.   Neurological: Positive for headaches (cluster). Negative for dizziness, weakness and numbness.   Hematological: Does not bruise/bleed easily.   All other systems reviewed and are negative.       Physical Exam     Initial Vitals [08/05/20 0406]   BP Pulse Resp Temp SpO2   (!) 177/112 80 16 98 °F (36.7 °C) 98 %      MAP       --          Physical Exam  Nursing Notes and Vital Signs Reviewed.  Constitutional: Patient is in mild distress. Well-developed and well-nourished.  Head: Atraumatic. Normocephalic.  Eyes: PERRL. EOM intact. Conjunctivae are not pale. No scleral icterus.  ENT: Mucous membranes are moist. Oropharynx is clear and symmetric.    Neck: Supple. Full ROM. No lymphadenopathy.  Cardiovascular: Regular rate. Regular rhythm. No murmurs, rubs, or gallops. Distal pulses are 2+ and symmetric.  Pulmonary/Chest: No respiratory distress. Clear to auscultation bilaterally. No wheezing or rales.  Abdominal: Soft and non-distended.  There is no tenderness.  No rebound, guarding, or rigidity. Good bowel sounds.  Genitourinary: No CVA tenderness  Musculoskeletal: Moves all extremities. No obvious deformities. No edema. No calf tenderness.  Skin: Warm and  "dry.  Neurological:  Alert, awake, and appropriate.  Normal speech. No acute focal neurological deficits are appreciated. Negative straight leg raise bilaterally.  Psychiatric: Normal affect. Good eye contact. Appropriate in content.     ED Course   Procedures  ED Vital Signs:  Vitals:    08/05/20 0406   BP: (!) 177/112   Pulse: 80   Resp: 16   Temp: 98 °F (36.7 °C)   TempSrc: Oral   SpO2: 98%   Weight: 111.8 kg (246 lb 9.4 oz)   Height: 5' 10" (1.778 m)                The Emergency Provider reviewed the vital signs and test results, which are outlined above.     ED Discussion     5:23 AM: Patient has informed nursing staff He is leaving. Patient walked out without waiting for further workup. Able to ambulate without assistance or difficulty. AAO X3 and not intoxicated. Patient has eloped at this time.                    ED Medication(s):  Medications   ketorolac injection 30 mg (has no administration in time range)   promethazine injection 25 mg (has no administration in time range)   dexamethasone injection 8 mg (has no administration in time range)       Discharge Medication List as of 8/5/2020  5:32 AM                  Scribe Attestation:   Scribe #1: I performed the above scribed service and the documentation accurately describes the services I performed. I attest to the accuracy of the note.     Attending:   Physician Attestation Statement for Scribe #1: I, Max Dhillon MD, personally performed the services described in this documentation, as scribed by Kelsi Oro, in my presence, and it is both accurate and complete.           Clinical Impression       ICD-10-CM ICD-9-CM   1. Nonintractable episodic headache, unspecified headache type  R51 784.0   2. Essential hypertension  I10 401.9       Disposition:   Disposition: Eloped         Max Dhillon MD  08/05/20 0542    "

## 2020-08-06 ENCOUNTER — TELEPHONE (OUTPATIENT)
Dept: NEUROLOGY | Facility: CLINIC | Age: 50
End: 2020-08-06

## 2020-08-06 NOTE — TELEPHONE ENCOUNTER
Please see my note from today - please request inpatient hospitalization for the medications listed from his insurance

## 2020-08-06 NOTE — TELEPHONE ENCOUNTER
Patient requests a repeat inpatient admission for repetitive intravenous protocol for intractable cluster headache. He was previously hospitalized at [facility] (elective) from 12/2/2019 to 12/6/2019 and he did fantastic on the protocol listed below. He has been in and out of the ED times so we are seeking to reduce healthcare utilization by definitely breaking the cycle, which was previously effective for 6 months.    Dx: intractable episodic cluster headache      Plan:    -- direct admission to neurology floor for up to 5 day hospitalization   -- telemetry  -- daily EKG   -- midline   -- benadryl 25mg IV + compazine 10mg IV + DHE 1mg slow IV push q8 hrs   -- solumedrol 125mg IV q6 hrs x 3 days  -- valium 10mg IV q8 hrs x 3 days  -- Lidocaine 3mg/kg x 90 min x 3 days     Clau House MD

## 2020-08-06 NOTE — ED NOTES
8/6/2020: Elope status noted, no further action required at this time     Heather Quigley RN  08/06/20 0879

## 2020-08-07 ENCOUNTER — TELEPHONE (OUTPATIENT)
Dept: NEUROLOGY | Facility: CLINIC | Age: 50
End: 2020-08-07

## 2020-08-07 NOTE — TELEPHONE ENCOUNTER
Left VM message I was calling to update him with the status of scheduling his in patient to [facility].

## 2020-08-07 NOTE — TELEPHONE ENCOUNTER
Spoke with pt to let him know the process of getting him approved for in pt infusions are in the works and we will be back in touch with him early next week.  Pt verbalized understanding of the process and thank us for continueing to work on this.

## 2020-08-13 ENCOUNTER — TELEPHONE (OUTPATIENT)
Dept: NEUROLOGY | Facility: CLINIC | Age: 50
End: 2020-08-13

## 2020-08-13 NOTE — TELEPHONE ENCOUNTER
Spoke with Sanjay, house supervisor at [facility] [[email or web address]  Reguested a bed for admission 3-5 days for infusions.  At this time beds are not available.  Sanjay stated he would begin utilization review and pre cert.  When a bed becomes available he will contact our office and/or the pt if over the weekend.

## 2020-08-17 ENCOUNTER — TELEPHONE (OUTPATIENT)
Dept: NEUROLOGY | Facility: CLINIC | Age: 50
End: 2020-08-17

## 2020-08-17 ENCOUNTER — PATIENT MESSAGE (OUTPATIENT)
Dept: NEUROLOGY | Facility: CLINIC | Age: 50
End: 2020-08-17

## 2020-08-19 ENCOUNTER — TELEPHONE (OUTPATIENT)
Dept: NEUROLOGY | Facility: CLINIC | Age: 50
End: 2020-08-19

## 2020-08-20 ENCOUNTER — TELEPHONE (OUTPATIENT)
Dept: NEUROLOGY | Facility: CLINIC | Age: 50
End: 2020-08-20

## 2020-08-28 ENCOUNTER — TELEPHONE (OUTPATIENT)
Dept: NEUROLOGY | Facility: CLINIC | Age: 50
End: 2020-08-28

## 2020-08-28 NOTE — TELEPHONE ENCOUNTER
Spoke with Tania, bed manager at [facility]. No beds today are probably through the weekend.  She asked for us to call first thing on Monday morning, that may be our best chance to have a bed available.  Will plan to do so. As for as an updated referral, Tania states once we have an admit date we can resubmit the referral.

## 2020-08-28 NOTE — TELEPHONE ENCOUNTER
----- Message from Clau House MD sent at 8/28/2020  1:54 PM CDT -----  Please check with [facility] on status of admission

## 2020-08-31 ENCOUNTER — TELEPHONE (OUTPATIENT)
Dept: PHARMACY | Facility: CLINIC | Age: 50
End: 2020-08-31

## 2020-09-01 ENCOUNTER — TELEPHONE (OUTPATIENT)
Dept: NEUROLOGY | Facility: CLINIC | Age: 50
End: 2020-09-01

## 2020-09-07 PROBLEM — G43.909 MIGRAINE: Status: ACTIVE | Noted: 2020-09-07

## 2020-09-10 ENCOUNTER — PATIENT MESSAGE (OUTPATIENT)
Dept: NEUROLOGY | Facility: CLINIC | Age: 50
End: 2020-09-10

## 2020-09-10 ENCOUNTER — TELEPHONE (OUTPATIENT)
Dept: NEUROLOGY | Facility: CLINIC | Age: 50
End: 2020-09-10

## 2020-09-10 NOTE — TELEPHONE ENCOUNTER
----- Message from Koki Brand RN sent at 9/10/2020  9:43 AM CDT -----  Please contact the patient to schedule follow up hospital appointment for headache protocol.  Thanks so much!

## 2020-09-22 ENCOUNTER — OFFICE VISIT (OUTPATIENT)
Dept: NEUROLOGY | Facility: CLINIC | Age: 50
End: 2020-09-22
Payer: MEDICARE

## 2020-09-22 VITALS
DIASTOLIC BLOOD PRESSURE: 82 MMHG | BODY MASS INDEX: 36.29 KG/M2 | WEIGHT: 253.5 LBS | HEART RATE: 78 BPM | HEIGHT: 70 IN | RESPIRATION RATE: 16 BRPM | SYSTOLIC BLOOD PRESSURE: 113 MMHG | TEMPERATURE: 99 F

## 2020-09-22 DIAGNOSIS — R11.0 NAUSEA: ICD-10-CM

## 2020-09-22 DIAGNOSIS — G44.011 INTRACTABLE EPISODIC CLUSTER HEADACHE: Primary | ICD-10-CM

## 2020-09-22 DIAGNOSIS — G44.40 MEDICATION OVERUSE HEADACHE: ICD-10-CM

## 2020-09-22 DIAGNOSIS — F33.2 SEVERE EPISODE OF RECURRENT MAJOR DEPRESSIVE DISORDER, WITHOUT PSYCHOTIC FEATURES: ICD-10-CM

## 2020-09-22 PROBLEM — F33.9 EPISODE OF RECURRENT MAJOR DEPRESSIVE DISORDER: Status: ACTIVE | Noted: 2020-09-22

## 2020-09-22 PROCEDURE — 3079F DIAST BP 80-89 MM HG: CPT | Mod: CPTII,S$GLB,, | Performed by: NURSE PRACTITIONER

## 2020-09-22 PROCEDURE — 3074F SYST BP LT 130 MM HG: CPT | Mod: CPTII,S$GLB,, | Performed by: NURSE PRACTITIONER

## 2020-09-22 PROCEDURE — 99214 OFFICE O/P EST MOD 30 MIN: CPT | Mod: S$GLB,,, | Performed by: NURSE PRACTITIONER

## 2020-09-22 PROCEDURE — 3008F PR BODY MASS INDEX (BMI) DOCUMENTED: ICD-10-PCS | Mod: CPTII,S$GLB,, | Performed by: NURSE PRACTITIONER

## 2020-09-22 PROCEDURE — 99214 PR OFFICE/OUTPT VISIT, EST, LEVL IV, 30-39 MIN: ICD-10-PCS | Mod: S$GLB,,, | Performed by: NURSE PRACTITIONER

## 2020-09-22 PROCEDURE — 99999 PR PBB SHADOW E&M-EST. PATIENT-LVL V: CPT | Mod: PBBFAC,,, | Performed by: NURSE PRACTITIONER

## 2020-09-22 PROCEDURE — 99999 PR PBB SHADOW E&M-EST. PATIENT-LVL V: ICD-10-PCS | Mod: PBBFAC,,, | Performed by: NURSE PRACTITIONER

## 2020-09-22 PROCEDURE — 3008F BODY MASS INDEX DOCD: CPT | Mod: CPTII,S$GLB,, | Performed by: NURSE PRACTITIONER

## 2020-09-22 PROCEDURE — 3079F PR MOST RECENT DIASTOLIC BLOOD PRESSURE 80-89 MM HG: ICD-10-PCS | Mod: CPTII,S$GLB,, | Performed by: NURSE PRACTITIONER

## 2020-09-22 PROCEDURE — 3074F PR MOST RECENT SYSTOLIC BLOOD PRESSURE < 130 MM HG: ICD-10-PCS | Mod: CPTII,S$GLB,, | Performed by: NURSE PRACTITIONER

## 2020-09-22 RX ORDER — PROCHLORPERAZINE MALEATE 10 MG
10 TABLET ORAL EVERY 6 HOURS PRN
Qty: 60 TABLET | Refills: 11 | Status: SHIPPED | OUTPATIENT
Start: 2020-09-22

## 2020-09-22 NOTE — PATIENT INSTRUCTIONS
Please call our clinic at [phone] or send a message to Dr. Araya or KLEBER Terry on the [employer] portal if there are any changes to the plan described below, for example,if you are not contacted for the requested tests, referral(s) within one week, if you are unable to receive the medications prescribed, or if you feel you need to change the treatment course for any reason.     TESTING:  -- none     REFERRALS:  -- Please contact the [facility] about possible admission.  is Mirta Morales.  [phone]    PREVENTION (use daily regardless of headache):  -- continue duloxetine 30mg daily at this time  -- START Gliacin once a day, if tolerating raise to 2 times per day, then 3 times per day (online at [email or web address])  -- CONTINUE Emgality 300mg (3 x 100mg injections) monthly ([facility])  -- Please try to wear CPAP a minimum of 4 hours nightly     See authorization for monthly PRN infusions  -- IV lidocaine only to start      AS-NEEDED TREATMENT (use total no more than 10 days per month unless otherwise stated):  -- recommend significant reduction to Stadol use  -- Imitrex use is OK for now, will need to be stopped 24 hours prior to DHE and stopped completely while on methergine   -- Start Lidocaine nasal spray as needed for headache - may use 4 times per day, OK TO USE DAILY   -- START compazine. This is a nausea medication that also has anti-migraine properties. Can take daily and will not contribute to rebound headaches     no blurred vision/no fever/no change in level of consciousness/no vomiting/no loss of consciousness/no nausea/no numbness/no weakness/no confusion

## 2020-09-22 NOTE — ASSESSMENT & PLAN NOTE
He is 6-weeks s/p three day inpatient infusions. He reports while in the hospital he had one day where he was without headache. Likely significant refractory to all prevention and infusions due to daily OTC NSAIDs, stadol use 2-3 times weekly, and oxycodone 3 days a week. We discussed at length how his headaches will be very difficult to treat while taking acute medications this often. Will add compazine to see if that will replace daily OTC. Recommended he try Gliacin as per Dr. House's recommendation.     He remains noncompliant with CPAP. Untreated sleep apnea is also likely contributing to refractory nature of headaches.    Discussed with Dr. Araya and patient given information to contact [facility] for possible inpatient treatment.  We will seek authorization for PRN monthly lidocaine only infusions.

## 2020-09-22 NOTE — PROGRESS NOTES
Date of service: 9/22/2020  Referring provider: No ref. provider found    Subjective:      Chief complaint: Follow-up (infusion admission)       Patient ID: Keith Duane Crawford is a 50 y.o. gentleman with intractable cluster headaches presenting for follow up of headache    Previously saw Dr. Allen     History of Present Illness    INTERVAL HISTORY 9/22/2020  At last visit, MRI brain ordered (normal) and he was to start Gliacin and Emgality for cluster migraines. Bridge of IV infusions also ordered. He was admitted to [facility] 9/7 for lidocaine protocol. He was also in MOD to stadol nasal spray and hydrocodone.     Today he reports he is about the same. Headaches are left sided. current pain 3 with range 1-8. He still has headaches 7 days per week. He takes tylenol, aleve, advil, sumatriptan, stadol, oxycodone 5 days per week.  Oxycodone three days per week. Stadol is on backorder until October, last used before hospital infusions. Sumatriptan IM at least once per week. OTC daily. He has not started the Gliacin, he states he forgot. He has used lidocaine NS which helps some. He has been on Emgality 300 mg for at least a year.    He is followed by Dr. Singh for back pain. He is scheduled for epidural steroid injections tomorrow.     He does not wear CPAP mask.     ORIGINAL HEADACHE HISTORY - 10/24/2019  Age at onset and course over time:  The headaches began in 2001 with 2 clusters, which went away until 2009 but had new onset migraines in between, improved in frequency on topiramate for a while. Developed knee problems and was on various pain medications. In Nov 2009 had his 5th knee scope. Was recommended to wean off pain medications. Has had chronic cluster since Nov 2009.  Reverted at some point to episodic frequency on Botox however this lost efficacy after approximately a year or so.    He was in the [facility] galcanezumab study - helped with frequency and intensity of his cluster headaches    Location:  Left  fronto/temporal / parietal  Quality:  [x] pressure [] tight [] throbbing [x] sharp [x] stabbing   Severity:  Current 3-4, at its best to, at its worst 9  Duration:  Sec, minutes, hours, days, constant  Frequency:  Daily, never pain-free, constant left hemicrania pain with escalations 2 dozen times per day with left ipsilateral autonomic features, rocks during escalations   Headaches awaken at night?:   3-4 nights per week  Worst time of day:  Mid day in the evening  Associated with: [x] photophobia [x]  phonophobia [] osmophobia [] blurred vision  [] double vision [x] loss of appetite [x] nausea [x] vomiting [x] dizziness [] vertigo  [] tinnitus [] irritability [x] sinus pressure [] problems with concentration   [] neck tightness   Alleviated by:  [] sleep [] darkness [] massage [] heat [] ice [x] medication  Exacerbated by:  [x] fatigue [x] light [] noise [] smells [] coughing [] sneezing  [] bending over [] ovulation [] menses [] alcohol [x] change in weather [x]  stress  Ipsilateral autonomic: [x] nasal congestion [x] lacrimation [] ptosis [] injection [] edema [] foreign body sensation [] ear fullness   ICP:  No transient visual obscurations, no tinnitus, no positional changes to headaches  Sleep habits:  He has trouble falling asleep, staying asleep, and non refreshing sleep he does have sleep apnea  Caffeine intake:  320 mg per day    Migraines - same location, more throbbing, sometimes bilateral     MIDAS 75 indicating severe disability  Hit 6 72    Current acute treatment:  Stadol nasal spray 4-6 sprays per day 5-6 days per week - reduced to 2-3 days per week  Hydrocodone 10mg 2-3 per day 5-6 days per week - reduced to 2-3 days per week    Reglan  Sumatriptan  Tizanidine    Current prevention:  Duloxetine 30 mg daily  Lisinopril    Previously tried/failed acute treatment:  Oxygen 15 L  Tylenol  Aspirin  Diclofenac  Ibuprofen  Indomethacin - unsure dose   Ketorolac  Aleve  Meloxicam  DHE infusions inpatient 3  days, some response, tolerates it OK   Axert  Relpax  Frova  Emerged  Maxalt  Treximet  Imitrex   Zomig  Fioricet  Excedrin  Infusions of - benadryl, phenergan, reglan, dilaudid, decadron - helpful for days     Previously tried/failed preventative treatment:  Lamotrigine  Trileptal  Lyrica  Topiramate  Keppra  Depakote  Valium  Xanax  Klonopin  Amitriptyline  Nortriptyline  Venlafaxine  Trazodone  Celexa  Lexapro  Gabapentin  Metoprolol  Bystolic  Propranolol  Verapamil  Gamma core  Cephaly  Occipital neurostimulation  Sphenopalatine ganglion block  Occipital nerve block  Ketamine infusion  Seroquel + depakote - caused first overdose  Lithium - developed toxicity   Botox - worked for about 1.5 years, chronic to episodic     No methergine, no gliacin     Review of patient's allergies indicates:  No Known Allergies  Current Outpatient Medications   Medication Sig Dispense Refill    amitriptyline (ELAVIL) 50 MG tablet Take 1 tablet by mouth at bedtime 30 tablet 0    amLODIPine (NORVASC) 5 MG tablet Take 5 mg by mouth once daily.      butorphanol (STADOL) 10 mg/mL nasal spray 1 spray by Nasal route every 4 (four) hours as needed for Pain (migraine).      cetirizine (ZYRTEC) 10 MG tablet Take 10 mg by mouth once daily.      diazePAM (VALIUM) 5 MG tablet Take 5 mg by mouth 2 (two) times daily as needed for Anxiety.       DULoxetine (CYMBALTA) 60 MG capsule Take 60 mg by mouth 2 (two) times a day.      ergocalciferol (ERGOCALCIFEROL) 50,000 unit Cap Take 50,000 Units by mouth every Tuesday.       fenofibrate 160 MG Tab Take 160 mg by mouth once daily.      galcanezumab-gnlm (GALCANEZUMAB-GNLM) 300 mg/3 mL (100 mg/mL x 3) Syrg Inject 300 mg into the skin every 28 days. 3 mL 11    lisinopriL (PRINIVIL,ZESTRIL) 20 MG tablet Take 1 tablet (20 mg total) by mouth once daily. 90 tablet 3    metoclopramide HCl (REGLAN) 10 MG tablet Take 10 mg by mouth every 6 (six) hours as needed (migraine).      oxyCODONE  (ROXICODONE) 10 mg Tab immediate release tablet Take 10 mg by mouth 3 (three) times daily as needed for Pain.      SUMAtriptan succinate 6 mg/0.5 mL Crtg Inject 6 mg into the skin as needed (up to twice a week for migraine).       tiZANidine (ZANAFLEX) 4 MG tablet Take 4 mg by mouth 3 (three) times daily as needed (muscle spasms).      diazePAM (VALIUM) 5 MG tablet Take 1 tablet by mouth twice daily as needed (Patient not taking: Reported on 9/22/2020) 60 tablet 0    oxyCODONE (ROXICODONE) 10 mg Tab immediate release tablet Take 1 tablet by mouth three times a day as needed for pain (Patient not taking: Reported on 9/22/2020) 90 tablet 0    prochlorperazine (COMPAZINE) 10 MG tablet Take 1 tablet (10 mg total) by mouth every 6 (six) hours as needed (migraine or nausea). 60 tablet 11    zolpidem (AMBIEN) 10 mg Tab Take 10 mg by mouth every evening.      zolpidem (AMBIEN) 10 mg Tab Take 1 tablet by mouth at bedtime as needed for sleep. May cause drowsiness, use caution. (Patient not taking: Reported on 9/22/2020) 30 tablet 5     No current facility-administered medications for this visit.        Past Medical History  Past Medical History:   Diagnosis Date    Arthritis     Back pain     Cluster headaches     Depression     HTN (hypertension)     Hyperlipidemia     PRISCILLA on CPAP        Past Surgical History  Past Surgical History:   Procedure Laterality Date    KNEE SURGERY         Family History  History reviewed. No pertinent family history.    Social History  Social History     Socioeconomic History    Marital status:      Spouse name: Not on file    Number of children: Not on file    Years of education: Not on file    Highest education level: Not on file   Occupational History    Not on file   Social Needs    Financial resource strain: Not on file    Food insecurity     Worry: Not on file     Inability: Not on file    Transportation needs     Medical: Not on file     Non-medical: Not on  file   Tobacco Use    Smoking status: Current Some Day Smoker     Packs/day: 1.00    Smokeless tobacco: Never Used   Substance and Sexual Activity    Alcohol use: No     Alcohol/week: 0.0 standard drinks    Drug use: No    Sexual activity: Not on file   Lifestyle    Physical activity     Days per week: Not on file     Minutes per session: Not on file    Stress: Not on file   Relationships    Social connections     Talks on phone: Not on file     Gets together: Not on file     Attends Church service: Not on file     Active member of club or organization: Not on file     Attends meetings of clubs or organizations: Not on file     Relationship status: Not on file   Other Topics Concern    Not on file   Social History Narrative    Not on file        Review of Systems  14-point review of systems as follows:   No check aime indicates NEGATIVE response   Constitutional: [] weight loss, [x] change to appetite   Eyes: [] change in vision, [x] double vision   Ears, nose, mouth, throat: [] frequent nose bleeds, [] ringing in the ears   Respiratory: [] cough, [] wheezing   Cardiovascular: [] chest pain, [] palpitations   Gastrointestinal: [] jaundice, [] nausea/vomiting   Genitourinary: [x] incontinence, [] burning with urination   Hematologic/lymphatic: [] easy bruising/bleeding, [] night sweats   Neurological: [] numbness, [x] weakness   Endocrine: [x] fatigue, [x] heat/cold intolerance   Allergy/Immunologic: [] fevers, [] chills   Musculoskeletal: [x] muscle pain, [x] joint pain   Psychiatric: [] thoughts of harming self/others (not recent, reports history of, no plan), [x] depression (not actively treated for)  Integumentary: [] rashes, [] sores that do not heal     Objective:        Vitals:    09/22/20 1021   BP: 113/82   Pulse: 78   Resp: 16   Temp: 98.5 °F (36.9 °C)     Body mass index is 36.38 kg/m².    10/24/19   Constitutional: appears in no acute distress until he has a cluster attack in the clinic  during which he appears very uncomfortable and restless, well-developed, well-nourished     Eyes: normal conjunctiva, PERRLA, optic discs are flat and sharp bilaterally     Ears, nose, mouth, throat: external appearance of ears and nose normal, hearing intact     Cardiovascular: regular rate and rhythm, no murmurs appreciated    Respiratory: unlabored respirations, breath sounds normal bilaterally    Gastrointestinal: no visible abdominal masses, no guarding, no visible hernia    Musculoskeletal: normal tone in all four extremities. No atrophy. No abnormal movements. No pronator drift. No orbit. Symmetric finger tapping. Normal gait and station. Digits and nails normal.      Spine:   CERVICAL SPINE:  ROM: normal   MUSCLE SPASM:  Mild throughout  FACET LOADING:  Bilateral  SPURLING: no  WINSOME / IGLESIA tender:  Bilateral    Psychiatric: normal judgment and insight. Oriented to person, place, and time.     Neurologic:   Cortical functions: recent and remote memory intact, normal attention span and concentration, speech fluent, adequate fund of knowledge   Cranial nerves: visual fields full, PERRLA, EOMI, symmetric facial strength, hearing intact, palate elevates symmetrically, shoulder shrug 5/5, tongue protrudes midline   Reflexes: 2+ in the upper and lower extremities, no Graf  Sensation: intact to temperature throughout   Coordination: normal finger to nose and tandem gait    Data Review:     I have personally reviewed the referring provider's notes, labs, & imaging made available to me today.      RADIOLOGY STUDIES:  I have personally reviewed the pertinent images performed.     No results found for this or any previous visit.    Lab Results   Component Value Date     09/09/2020    K 3.9 09/09/2020     09/09/2020    CO2 26 09/09/2020    BUN 24 (H) 09/09/2020    CREATININE 0.90 09/09/2020     09/09/2020    AST 28 09/09/2020    ALT 28 09/09/2020    ALBUMIN 4.6 09/09/2020    PROT 7.1 09/09/2020     BILITOT 0.4 09/09/2020       Lab Results   Component Value Date    WBC 6.41 09/07/2020    HGB 14.9 09/07/2020    HCT 43.2 09/07/2020    MCV 90 09/07/2020     (H) 09/07/2020       No results found for: TSH        Assessment & Plan:       Problem List Items Addressed This Visit        Neuro    Intractable episodic cluster headache - Primary    Overview     Initial 2001, much more frequent since 2009  Has trialed in failed an extensive list of as needed and preventative agents with short responses to for example Botox, occipital block, SPG block and infusions    Begin prevention with gliacin and Emgality  Disability at this point is severe and there is significant medication overuse especially to stadol thus I have little hope that any preventative will work without a VERY strong bridge regimen to cool down his current severity, for this reason I recommended inpatient admission for DHE with adjuncts, followed by a prolonged methergine taper.  May consider inpatient or outpatient IV lidocaine.  May consider sphenopalatine block.    Since the headaches are so incredibly refractory and he has not been imaged almost a decade, I do recommend a surveillance brain MRI to make sure we are not dealing with any type of secondary process         Current Assessment & Plan     He is 6-weeks s/p three day inpatient infusions. He reports while in the hospital he had one day where he was without headache. Likely significant refractory to all prevention and infusions due to daily OTC NSAIDs, stadol use 2-3 times weekly, and oxycodone 3 days a week. We discussed at length how his headaches will be very difficult to treat while taking acute medications this often. Will add compazine to see if that will replace daily OTC. Recommended he try Gliacin as per Dr. House's recommendation.     He remains noncompliant with CPAP. Untreated sleep apnea is also likely contributing to refractory nature of headaches.    Discussed with   Marshal and patient given information to contact [facility] for possible inpatient treatment.  We will seek authorization for PRN monthly lidocaine only infusions.          Relevant Medications    prochlorperazine (COMPAZINE) 10 MG tablet    Medication overuse headache    Overview     Stadol nasal spray 4-6 sprays per day 5-6 days per week  Hydrocodone 10mg 2-3 per day 5-6 days per week         Current Assessment & Plan     Daily OTC NSAID  Stadol nasal spray 3 days per week  Oxycodone 3 days per week         Relevant Medications    prochlorperazine (COMPAZINE) 10 MG tablet       Psychiatric    Episode of recurrent major depressive disorder    Overview     Does not appear to be in treatment for depression. He states remote history of suicidal ideation. Long discussion on treatment and he will follow up with PCP on Friday. PCP office made aware. He denies plan to hurt himself or others. Does admit to depression. Current mood may be related to medications he received during infusion as well.            Other Visit Diagnoses     Nausea        Relevant Medications    prochlorperazine (COMPAZINE) 10 MG tablet              Please call our clinic at [phone] or send a message to Dr. Araya or KLEBER Terry on the [employer] portal if there are any changes to the plan described below, for example,if you are not contacted for the requested tests, referral(s) within one week, if you are unable to receive the medications prescribed, or if you feel you need to change the treatment course for any reason.     TESTING:  -- none     REFERRALS:  -- Please contact the [facility] about possible admission.  is Mirta Morales.  [phone]    PREVENTION (use daily regardless of headache):  -- continue duloxetine 30mg daily at this time  -- START Gliacin once a day, if tolerating raise to 2 times per day, then 3 times per day (online at [email or web address])  --  CONTINUE Emgality 300mg (3 x 100mg injections) monthly ([facility])  -- Please try to wear CPAP a minimum of 4 hours nightly     See authorization for monthly PRN infusions  -- IV lidocaine only to start      AS-NEEDED TREATMENT (use total no more than 10 days per month unless otherwise stated):  -- recommend significant reduction to Stadol use  -- Imitrex use is OK for now, will need to be stopped 24 hours prior to DHE and stopped completely while on methergine   -- Start Lidocaine nasal spray as needed for headache - may use 4 times per day, OK TO USE DAILY   -- START compazine. This is a nausea medication that also has anti-migraine properties. Can take daily and will not contribute to rebound headaches      Follow up in about 6 weeks (around 11/3/2020) for follow up with Dr. Araya.    Tania Banerjee NP

## 2020-10-01 ENCOUNTER — TELEPHONE (OUTPATIENT)
Dept: PHARMACY | Facility: CLINIC | Age: 50
End: 2020-10-01

## 2020-11-10 ENCOUNTER — SPECIALTY PHARMACY (OUTPATIENT)
Dept: PHARMACY | Facility: CLINIC | Age: 50
End: 2020-11-10

## 2020-11-18 ENCOUNTER — TELEPHONE (OUTPATIENT)
Dept: NEUROLOGY | Facility: CLINIC | Age: 50
End: 2020-11-18

## 2020-11-18 ENCOUNTER — OFFICE VISIT (OUTPATIENT)
Dept: NEUROLOGY | Facility: CLINIC | Age: 50
End: 2020-11-18
Payer: MEDICARE

## 2020-11-18 VITALS
DIASTOLIC BLOOD PRESSURE: 102 MMHG | BODY MASS INDEX: 36.05 KG/M2 | TEMPERATURE: 98 F | SYSTOLIC BLOOD PRESSURE: 142 MMHG | RESPIRATION RATE: 18 BRPM | HEART RATE: 93 BPM | WEIGHT: 251.19 LBS

## 2020-11-18 DIAGNOSIS — E66.01 SEVERE OBESITY (BMI 35.0-39.9) WITH COMORBIDITY: ICD-10-CM

## 2020-11-18 DIAGNOSIS — G44.011 INTRACTABLE EPISODIC CLUSTER HEADACHE: Primary | ICD-10-CM

## 2020-11-18 DIAGNOSIS — G47.33 OSA (OBSTRUCTIVE SLEEP APNEA): ICD-10-CM

## 2020-11-18 DIAGNOSIS — G44.40 MEDICATION OVERUSE HEADACHE: ICD-10-CM

## 2020-11-18 DIAGNOSIS — I10 ESSENTIAL HYPERTENSION: ICD-10-CM

## 2020-11-18 PROCEDURE — 1125F PR PAIN SEVERITY QUANTIFIED, PAIN PRESENT: ICD-10-PCS | Mod: S$GLB,,, | Performed by: NURSE PRACTITIONER

## 2020-11-18 PROCEDURE — 3077F SYST BP >= 140 MM HG: CPT | Mod: CPTII,S$GLB,, | Performed by: NURSE PRACTITIONER

## 2020-11-18 PROCEDURE — 3080F DIAST BP >= 90 MM HG: CPT | Mod: CPTII,S$GLB,, | Performed by: NURSE PRACTITIONER

## 2020-11-18 PROCEDURE — 3077F PR MOST RECENT SYSTOLIC BLOOD PRESSURE >= 140 MM HG: ICD-10-PCS | Mod: CPTII,S$GLB,, | Performed by: NURSE PRACTITIONER

## 2020-11-18 PROCEDURE — 99214 OFFICE O/P EST MOD 30 MIN: CPT | Mod: S$GLB,,, | Performed by: NURSE PRACTITIONER

## 2020-11-18 PROCEDURE — 99499 UNLISTED E&M SERVICE: CPT | Mod: S$GLB,,, | Performed by: NURSE PRACTITIONER

## 2020-11-18 PROCEDURE — 3008F PR BODY MASS INDEX (BMI) DOCUMENTED: ICD-10-PCS | Mod: CPTII,S$GLB,, | Performed by: NURSE PRACTITIONER

## 2020-11-18 PROCEDURE — 1125F AMNT PAIN NOTED PAIN PRSNT: CPT | Mod: S$GLB,,, | Performed by: NURSE PRACTITIONER

## 2020-11-18 PROCEDURE — 3080F PR MOST RECENT DIASTOLIC BLOOD PRESSURE >= 90 MM HG: ICD-10-PCS | Mod: CPTII,S$GLB,, | Performed by: NURSE PRACTITIONER

## 2020-11-18 PROCEDURE — 99499 RISK ADDL DX/OHS AUDIT: ICD-10-PCS | Mod: S$GLB,,, | Performed by: NURSE PRACTITIONER

## 2020-11-18 PROCEDURE — 99999 PR PBB SHADOW E&M-EST. PATIENT-LVL V: CPT | Mod: PBBFAC,,, | Performed by: NURSE PRACTITIONER

## 2020-11-18 PROCEDURE — 99214 PR OFFICE/OUTPT VISIT, EST, LEVL IV, 30-39 MIN: ICD-10-PCS | Mod: S$GLB,,, | Performed by: NURSE PRACTITIONER

## 2020-11-18 PROCEDURE — 99999 PR PBB SHADOW E&M-EST. PATIENT-LVL V: ICD-10-PCS | Mod: PBBFAC,,, | Performed by: NURSE PRACTITIONER

## 2020-11-18 PROCEDURE — 3008F BODY MASS INDEX DOCD: CPT | Mod: CPTII,S$GLB,, | Performed by: NURSE PRACTITIONER

## 2020-11-18 RX ORDER — SUMATRIPTAN SUCCINATE 6 MG/.5ML
6 INJECTION, SOLUTION SUBCUTANEOUS
Qty: 6 EACH | Refills: 3 | Status: SHIPPED | OUTPATIENT
Start: 2020-11-18 | End: 2021-04-07

## 2020-11-18 NOTE — TELEPHONE ENCOUNTER
Called pt to request him to be evaluated by Tania Banerjee NP and if pt can come in at 1PM, instead of 1:20PM. Pt agrees to this and verbalizes understanding.

## 2020-11-18 NOTE — PROGRESS NOTES
Date of service: 11/18/2020  Referring provider: No ref. provider found    Subjective:      Chief complaint: Headache       Patient ID: Keith Duane Crawford is a 50 y.o. gentleman with intractable cluster headaches presenting for follow up of headache    Previously saw Dr. Allen     History of Present Illness    INTERVAL HISTORY 11/18/2020  At last visit, 2 months ago, we recommended he contact the [facility] headache and pain department for possible evaluation. He was to start gliacin and continue emgality. Goal to have him start monthly PRN lidocaine infusions. We also added compazine and lidocaine NS.    Today he reports he is a little worse. He still has cluster headaches 3-4 days per week. He reports increased pain due to needing a tooth pulled (will happen next week), recent kidney stone, and life stressors. Headaches occur left side and behind the eye. Current pain 2 with range 1-8. He remains with daily headache. He takes either sumatriptan, oxycodone, butalbital, or tylenol daily. He does report he has not had to go to ER which is an improvement.     He has received the Gliacon but has not started. He is not wearing CPAP at all due to comfort.     INTERVAL HISTORY 9/22/2020  At last visit, MRI brain ordered (normal) and he was to start Gliacin and Emgality for cluster migraines. Bridge of IV infusions also ordered. He was admitted to [facility] 9/7 for lidocaine protocol. He was also in MOD to stadol nasal spray and hydrocodone.     Today he reports he is about the same. Headaches are left sided. current pain 3 with range 1-8. He still has headaches 7 days per week. He takes tylenol, aleve, advil, sumatriptan, stadol, oxycodone 5 days per week.  Oxycodone three days per week. Stadol is on backorder until October, last used before hospital infusions. Sumatriptan IM at least once per week. OTC daily. He has not started the Gliacin, he states he forgot. He has used lidocaine NS which helps some. He has been on  Emgality 300 mg for at least a year.    He is followed by Dr. Singh for back pain. He is scheduled for epidural steroid injections tomorrow.     He does not wear CPAP mask.     ORIGINAL HEADACHE HISTORY - 10/24/2019  Age at onset and course over time:  The headaches began in 2001 with 2 clusters, which went away until 2009 but had new onset migraines in between, improved in frequency on topiramate for a while. Developed knee problems and was on various pain medications. In Nov 2009 had his 5th knee scope. Was recommended to wean off pain medications. Has had chronic cluster since Nov 2009.  Reverted at some point to episodic frequency on Botox however this lost efficacy after approximately a year or so.    He was in the [facility] galcanezumab study - helped with frequency and intensity of his cluster headaches    Location:  Left fronto/temporal / parietal  Quality:  [x] pressure [] tight [] throbbing [x] sharp [x] stabbing   Severity:  Current 3-4, at its best to, at its worst 9  Duration:  Sec, minutes, hours, days, constant  Frequency:  Daily, never pain-free, constant left hemicrania pain with escalations 2 dozen times per day with left ipsilateral autonomic features, rocks during escalations   Headaches awaken at night?:   3-4 nights per week  Worst time of day:  Mid day in the evening  Associated with: [x] photophobia [x]  phonophobia [] osmophobia [] blurred vision  [] double vision [x] loss of appetite [x] nausea [x] vomiting [x] dizziness [] vertigo  [] tinnitus [] irritability [x] sinus pressure [] problems with concentration   [] neck tightness   Alleviated by:  [] sleep [] darkness [] massage [] heat [] ice [x] medication  Exacerbated by:  [x] fatigue [x] light [] noise [] smells [] coughing [] sneezing  [] bending over [] ovulation [] menses [] alcohol [x] change in weather [x]  stress  Ipsilateral autonomic: [x] nasal congestion [x] lacrimation [] ptosis [] injection [] edema [] foreign body sensation [] ear  fullness   ICP:  No transient visual obscurations, no tinnitus, no positional changes to headaches  Sleep habits:  He has trouble falling asleep, staying asleep, and non refreshing sleep he does have sleep apnea  Caffeine intake:  320 mg per day    Migraines - same location, more throbbing, sometimes bilateral     MIDAS 75 indicating severe disability  Hit 6 72    Current acute treatment:  Stadol nasal spray reduced to using every few days  Oxycodone 10mg 2-3 per day 5-6 days per week - reduced to 2-3 days per week    Reglan  Sumatriptan  Tizanidine  Compazine - effective   Lidocaine NS - some relief    Current prevention:  Duloxetine 30 mg daily  Lisinopril  Emgality 300 mg monthly - over a year but reports it has helped some    Previously tried/failed acute treatment:  Oxygen 15 L  Tylenol  Aspirin  Diclofenac  Ibuprofen  Indomethacin - unsure dose   Ketorolac  Aleve  Meloxicam  DHE infusions inpatient 3 days, some response, tolerates it OK   Axert  Relpax  Frova  Emerged  Maxalt  Treximet  Imitrex   Zomig  Fioricet  Excedrin  Infusions of - benadryl, phenergan, reglan, dilaudid, decadron - helpful for days     Previously tried/failed preventative treatment:  Lamotrigine  Trileptal  Lyrica  Topiramate  Keppra  Depakote  Valium  Xanax  Klonopin  Amitriptyline  Nortriptyline  Venlafaxine  Trazodone  Celexa  Lexapro  Gabapentin  Metoprolol  Bystolic  Propranolol  Verapamil  Gamma core  Cephaly  Occipital neurostimulation  Sphenopalatine ganglion block  Occipital nerve block  Ketamine infusion  Seroquel + depakote - caused first overdose  Lithium - developed toxicity   Botox - worked for about 1.5 years, chronic to episodic     No methergine, no gliacin     Review of patient's allergies indicates:  No Known Allergies  Current Outpatient Medications   Medication Sig Dispense Refill    amitriptyline (ELAVIL) 50 MG tablet Take 1 tablet by mouth at bedtime 30 tablet 0    amLODIPine (NORVASC) 5 MG tablet Take 5 mg by  mouth once daily.      butorphanol (STADOL) 10 mg/mL nasal spray 1 spray by Nasal route every 4 (four) hours as needed for Pain (migraine).      cetirizine (ZYRTEC) 10 MG tablet Take 10 mg by mouth once daily.      diazePAM (VALIUM) 5 MG tablet Take 5 mg by mouth 2 (two) times daily as needed for Anxiety.       diazePAM (VALIUM) 5 MG tablet Take 1 tablet by mouth twice daily as needed 60 tablet 0    DULoxetine (CYMBALTA) 60 MG capsule Take 60 mg by mouth 2 (two) times a day.      ergocalciferol (ERGOCALCIFEROL) 50,000 unit Cap Take 50,000 Units by mouth every Tuesday.       fenofibrate 160 MG Tab Take 160 mg by mouth once daily.      galcanezumab-gnlm (GALCANEZUMAB-GNLM) 300 mg/3 mL (100 mg/mL x 3) Syrg Inject 300 mg into the skin every 28 days. 3 mL 11    lisinopriL (PRINIVIL,ZESTRIL) 20 MG tablet Take 1 tablet (20 mg total) by mouth once daily. 90 tablet 3    metoclopramide HCl (REGLAN) 10 MG tablet Take 10 mg by mouth every 6 (six) hours as needed (migraine).      oxyCODONE (ROXICODONE) 10 mg Tab immediate release tablet Take 10 mg by mouth 3 (three) times daily as needed for Pain.      oxyCODONE (ROXICODONE) 10 mg Tab immediate release tablet Take 1 tablet by mouth three times a day as needed for pain 90 tablet 0    oxyCODONE (ROXICODONE) 15 MG Tab Take 1 tablet by mouth three times a day as needed for pain 90 tablet 0    prochlorperazine (COMPAZINE) 10 MG tablet Take 1 tablet (10 mg total) by mouth every 6 (six) hours as needed (migraine or nausea). 60 tablet 11    SUMAtriptan succinate 6 mg/0.5 mL Crtg Inject 6 mg into the skin as needed (up to twice a week for migraine). 6 each 3    tiZANidine (ZANAFLEX) 4 MG tablet Take 4 mg by mouth 3 (three) times daily as needed (muscle spasms).      tiZANidine (ZANAFLEX) 4 MG tablet Take 1 tablet (4 mg total) by mouth 3 (three) times daily as needed for pain muscle spasms/may cause sedation/ do not drive on med 90 tablet 5    zolpidem (AMBIEN) 10 mg Tab  Take 10 mg by mouth every evening.      zolpidem (AMBIEN) 10 mg Tab Take 1 tablet by mouth at bedtime as needed for sleep. May cause drowsiness, use caution. 30 tablet 5     No current facility-administered medications for this visit.        Past Medical History  Past Medical History:   Diagnosis Date    Arthritis     Back pain     Cluster headaches     Depression     HTN (hypertension)     Hyperlipidemia     PRISCILLA on CPAP        Past Surgical History  Past Surgical History:   Procedure Laterality Date    KNEE SURGERY         Family History  History reviewed. No pertinent family history.    Social History  Social History     Socioeconomic History    Marital status:      Spouse name: Not on file    Number of children: Not on file    Years of education: Not on file    Highest education level: Not on file   Occupational History    Not on file   Social Needs    Financial resource strain: Not on file    Food insecurity     Worry: Not on file     Inability: Not on file    Transportation needs     Medical: Not on file     Non-medical: Not on file   Tobacco Use    Smoking status: Former Smoker     Packs/day: 1.00     Quit date: 2020     Years since quittin.0    Smokeless tobacco: Former User   Substance and Sexual Activity    Alcohol use: No     Alcohol/week: 0.0 standard drinks    Drug use: No    Sexual activity: Not on file   Lifestyle    Physical activity     Days per week: Not on file     Minutes per session: Not on file    Stress: Not on file   Relationships    Social connections     Talks on phone: Not on file     Gets together: Not on file     Attends Protestant service: Not on file     Active member of club or organization: Not on file     Attends meetings of clubs or organizations: Not on file     Relationship status: Not on file   Other Topics Concern    Not on file   Social History Narrative    Not on file        Review of Systems  14-point review of systems as follows:    No check aime indicates NEGATIVE response   Constitutional: [] weight loss, [] change to appetite   Eyes: [] change in vision, [] double vision   Ears, nose, mouth, throat: [] frequent nose bleeds, [] ringing in the ears   Respiratory: [] cough, [] wheezing   Cardiovascular: [] chest pain, [] palpitations   Gastrointestinal: [] jaundice, [] nausea/vomiting   Genitourinary: [] incontinence, [] burning with urination   Hematologic/lymphatic: [] easy bruising/bleeding, [] night sweats   Neurological: [] numbness, [] weakness   Endocrine: [] fatigue, [] heat/cold intolerance   Allergy/Immunologic: [] fevers, [] chills   Musculoskeletal: [x] muscle pain, [x] joint pain   Psychiatric: [] thoughts of harming self/others, [x] depression   Integumentary: [] rashes, [] sores that do not heal     Objective:        Vitals:    11/18/20 1259   BP: (!) 142/102   Pulse: 93   Resp: 18   Temp: 98.2 °F (36.8 °C)     Body mass index is 36.05 kg/m².    10/24/19   Constitutional: appears in no acute distress until he has a cluster attack in the clinic during which he appears very uncomfortable and restless, well-developed, well-nourished     Eyes: normal conjunctiva, PERRLA, optic discs are flat and sharp bilaterally     Ears, nose, mouth, throat: external appearance of ears and nose normal, hearing intact     Cardiovascular: regular rate and rhythm, no murmurs appreciated    Respiratory: unlabored respirations, breath sounds normal bilaterally    Gastrointestinal: no visible abdominal masses, no guarding, no visible hernia    Musculoskeletal: normal tone in all four extremities. No atrophy. No abnormal movements. No pronator drift. No orbit. Symmetric finger tapping. Normal gait and station. Digits and nails normal.      Spine:   CERVICAL SPINE:  ROM: normal   MUSCLE SPASM:  Mild throughout  FACET LOADING:  Bilateral  SPURLING: no  WINSOME / IGLESIA tender:  Bilateral    Psychiatric: normal judgment and insight. Oriented to person, place, and  time.     Neurologic:   Cortical functions: recent and remote memory intact, normal attention span and concentration, speech fluent, adequate fund of knowledge   Cranial nerves: visual fields full, PERRLA, EOMI, symmetric facial strength, hearing intact, palate elevates symmetrically, shoulder shrug 5/5, tongue protrudes midline   Reflexes: 2+ in the upper and lower extremities, no Graf  Sensation: intact to temperature throughout   Coordination: normal finger to nose and tandem gait    Data Review:     I have personally reviewed the referring provider's notes, labs, & imaging made available to me today.      RADIOLOGY STUDIES:  I have personally reviewed the pertinent images performed.     No results found for this or any previous visit.    Lab Results   Component Value Date     09/09/2020    K 3.9 09/09/2020     09/09/2020    CO2 26 09/09/2020    BUN 24 (H) 09/09/2020    CREATININE 0.90 09/09/2020     09/09/2020    AST 28 09/09/2020    ALT 28 09/09/2020    ALBUMIN 4.6 09/09/2020    PROT 7.1 09/09/2020    BILITOT 0.4 09/09/2020       Lab Results   Component Value Date    WBC 6.41 09/07/2020    HGB 14.9 09/07/2020    HCT 43.2 09/07/2020    MCV 90 09/07/2020     (H) 09/07/2020       No results found for: TSH        Assessment & Plan:       Problem List Items Addressed This Visit        Neuro    Intractable episodic cluster headache - Primary    Overview     Initial 2001, much more frequent since 2009  Has trialed in failed an extensive list of as needed and preventative agents with short responses to for example Botox, occipital block, SPG block and infusions    Begin prevention with gliacin and Emgality  Disability at this point is severe and there is significant medication overuse especially to stadol thus I have little hope that any preventative will work without a VERY strong bridge regimen to cool down his current severity, for this reason I recommended inpatient admission for DHE  with adjuncts, followed by a prolonged methergine taper.  May consider inpatient or outpatient IV lidocaine.  May consider sphenopalatine block.    Since the headaches are so incredibly refractory and he has not been imaged almost a decade, I do recommend a surveillance brain MRI to make sure we are not dealing with any type of secondary process         Current Assessment & Plan     Continue Emgality as it seems to be helping. He has recently received Gliacin and encouraged to start this. He continues to reduce his use of stadol and oxycodone. We will try for monthly lidocaine PRN infusions. Again encouraged CPAP use.          Relevant Medications    SUMAtriptan succinate 6 mg/0.5 mL Crtg    Medication overuse headache    Overview     Stadol nasal spray 4-6 sprays per day 5-6 days per week  Hydrocodone 10mg 2-3 per day 5-6 days per week         Current Assessment & Plan     Reducing use a little.             Cardiac/Vascular    Essential hypertension       Other    PRISCILLA (obstructive sleep apnea)      Other Visit Diagnoses     Severe obesity (BMI 35.0-39.9) with comorbidity        no change in past 8 weeks. continues to not wear CPAP, blood pressure elevated today              Please call our clinic at [phone] or send a message to Dr. Araya or KLEBER Terry on the [employer] portal if there are any changes to the plan described below, for example,if you are not contacted for the requested tests, referral(s) within one week, if you are unable to receive the medications prescribed, or if you feel you need to change the treatment course for any reason.     TESTING:  -- none     REFERRALS:  -- Please contact the [facility] clinic about possible admission.  is Mirta Morales.  [phone]    PREVENTION (use daily regardless of headache):  -- continue duloxetine 30mg daily at this time  -- START Gliacin once a day, if tolerating raise to 2 times per day, then 3 times per day  (online at [email or web address])  -- CONTINUE Emgality 300mg (3 x 100mg injections) monthly ([facility])  -- Please try to wear CPAP a minimum of 4 hours nightly     See authorization for monthly PRN infusions  -- IV lidocaine only to start      AS-NEEDED TREATMENT (use total no more than 10 days per month unless otherwise stated):  -- recommend significant reduction to Stadol use  -- Imitrex use is OK for now, will need to be stopped 24 hours prior to DHE and stopped completely while on methergine   -- continue Lidocaine nasal spray as needed for headache - may use 4 times per day, OK TO USE DAILY   -- continue compazine. This is a nausea medication that also has anti-migraine properties. Can take daily and will not contribute to rebound headaches      Follow up in about 2 months (around 1/18/2021) for follow up with Dr. Guerrero.    Tania Banerjee NP

## 2020-11-18 NOTE — ASSESSMENT & PLAN NOTE
Continue Emgality as it seems to be helping. He has recently received Gliacin and encouraged to start this. He continues to reduce his use of stadol and oxycodone. We will try for monthly lidocaine PRN infusions. Again encouraged CPAP use.

## 2020-11-18 NOTE — PATIENT INSTRUCTIONS
Please call our clinic at [phone] or send a message to Dr. Araya or KLEBER Terry on the [employer] portal if there are any changes to the plan described below, for example,if you are not contacted for the requested tests, referral(s) within one week, if you are unable to receive the medications prescribed, or if you feel you need to change the treatment course for any reason.     TESTING:  -- none     REFERRALS:  -- Please contact the [facility] clinic about possible admission.  is Mirta Morales.  [phone]    PREVENTION (use daily regardless of headache):  -- continue duloxetine 30mg daily at this time  -- START Gliacin once a day, if tolerating raise to 2 times per day, then 3 times per day (online at [email or web address])  -- CONTINUE Emgality 300mg (3 x 100mg injections) monthly ([facility])  -- Please try to wear CPAP a minimum of 4 hours nightly     See authorization for monthly PRN infusions  -- IV lidocaine only to start      AS-NEEDED TREATMENT (use total no more than 10 days per month unless otherwise stated):  -- recommend significant reduction to Stadol use  -- Imitrex use is OK for now, will need to be stopped 24 hours prior to DHE and stopped completely while on methergine   -- continue Lidocaine nasal spray as needed for headache - may use 4 times per day, OK TO USE DAILY   -- continue compazine. This is a nausea medication that also has anti-migraine properties. Can take daily and will not contribute to rebound headaches

## 2020-11-18 NOTE — PROGRESS NOTES
Patient, Keith Duane Crawford (MRN #[id number]), presented with a recorded BMI of 36.05 kg/m^2 and a documented comorbidity(s):  - Obstructive Sleep Apnea  - Hypertension  to which the severe obesity is a contributing factor. This is consistent with the definition of severe obesity (BMI 35.0-39.9) with comorbidity (ICD-10 E66.01, Z68.35). The patient's severe obesity was monitored, evaluated, addressed and/or treated. This addendum to the medical record is made on 11/18/2020.

## 2020-12-04 ENCOUNTER — SPECIALTY PHARMACY (OUTPATIENT)
Dept: PHARMACY | Facility: CLINIC | Age: 50
End: 2020-12-04

## 2020-12-04 NOTE — TELEPHONE ENCOUNTER
Specialty Pharmacy - Refill Coordination    Specialty Medication Orders Linked to Encounter      Most Recent Value   Medication #1  galcanezumab-gnlm (GALCANEZUMAB-GNLM) 300 mg/3 mL (100 mg/mL x 3) Syrg (Order#[id number], Rx#[id number])          Refill Questions - Documented Responses      Most Recent Value   Relationship to patient of person spoken to?  Self   HIPAA/medical authority confirmed?  Yes   How will the patient receive the medication?  Mail   When does the patient need to receive the medication?  12/08/20   Shipping Address  Home   Address in [facility] confirmed and updated if neccessary?  Yes   Expected Copay ($)  0   Is the patient able to afford the medication copay?  Yes   Payment Method  zero copay   Days supply of Refill  28   Would patient like to speak to a pharmacist?  No   Do you want to trigger an intervention?  No   Do you want to trigger an additional referral task?  No   Refill activity completed?  Yes   Refill activity plan  Refill scheduled   Shipment/Pickup Date:  12/08/20          Current Outpatient Medications   Medication Sig    amitriptyline (ELAVIL) 50 MG tablet Take 1 tablet by mouth at bedtime    amLODIPine (NORVASC) 5 MG tablet Take 5 mg by mouth once daily.    butorphanol (STADOL) 10 mg/mL nasal spray 1 spray by Nasal route every 4 (four) hours as needed for Pain (migraine).    cetirizine (ZYRTEC) 10 MG tablet Take 10 mg by mouth once daily.    diazePAM (VALIUM) 5 MG tablet Take 5 mg by mouth 2 (two) times daily as needed for Anxiety.     diazePAM (VALIUM) 5 MG tablet Take 1 tablet by mouth twice daily as needed    DULoxetine (CYMBALTA) 60 MG capsule Take 60 mg by mouth 2 (two) times a day.    ergocalciferol (ERGOCALCIFEROL) 50,000 unit Cap Take 50,000 Units by mouth every Tuesday.     fenofibrate 160 MG Tab Take 160 mg by mouth once daily.    galcanezumab-gnlm (GALCANEZUMAB-GNLM) 300 mg/3 mL (100 mg/mL x 3) Syrg Inject 300 mg into the skin every 28 days.     lisinopriL (PRINIVIL,ZESTRIL) 20 MG tablet Take 1 tablet (20 mg total) by mouth once daily.    metoclopramide HCl (REGLAN) 10 MG tablet Take 10 mg by mouth every 6 (six) hours as needed (migraine).    oxyCODONE (ROXICODONE) 10 mg Tab immediate release tablet Take 10 mg by mouth 3 (three) times daily as needed for Pain.    oxyCODONE (ROXICODONE) 10 mg Tab immediate release tablet Take 1 tablet by mouth three times a day as needed for pain    oxyCODONE (ROXICODONE) 15 MG Tab Take 1 tablet by mouth three times a day as needed for pain    prochlorperazine (COMPAZINE) 10 MG tablet Take 1 tablet (10 mg total) by mouth every 6 (six) hours as needed (migraine or nausea).    SUMAtriptan succinate 6 mg/0.5 mL Crtg Inject 6 mg into the skin as needed (up to twice a week for migraine).    tiZANidine (ZANAFLEX) 4 MG tablet Take 4 mg by mouth 3 (three) times daily as needed (muscle spasms).    tiZANidine (ZANAFLEX) 4 MG tablet Take 1 tablet (4 mg total) by mouth 3 (three) times daily as needed for pain muscle spasms/may cause sedation/ do not drive on med    zolpidem (AMBIEN) 10 mg Tab Take 10 mg by mouth every evening.    zolpidem (AMBIEN) 10 mg Tab Take 1 tablet by mouth at bedtime as needed for sleep. May cause drowsiness, use caution.   Last reviewed on 11/18/2020  1:02 PM by Tania Banerjee NP    Review of patient's allergies indicates:  No Known Allergies Last reviewed on  11/18/2020 1:02 PM by Tania Banerjee      Tasks added this encounter   12/27/2020 - Refill Call (Auto Added)   Tasks due within next 3 months   No tasks due.     [facility]  [address] [place] [id number]  Phone: [phone]  Fax: [phone]

## 2020-12-07 ENCOUNTER — TELEPHONE (OUTPATIENT)
Dept: NEUROLOGY | Facility: CLINIC | Age: 50
End: 2020-12-07

## 2020-12-07 NOTE — TELEPHONE ENCOUNTER
Called patient and scheduled appointment with DR Guerrero to establish care due to being previous DR House patient. Date/Time/Location confirmed. Verbalized understanding.

## 2020-12-09 ENCOUNTER — TELEPHONE (OUTPATIENT)
Dept: NEUROLOGY | Facility: CLINIC | Age: 50
End: 2020-12-09

## 2020-12-09 NOTE — TELEPHONE ENCOUNTER
Called [facility] infusion center to check on pt starting his Lidocaine infusions.  Imani away from her desk msg left to return call

## 2021-01-06 ENCOUNTER — OFFICE VISIT (OUTPATIENT)
Dept: NEUROLOGY | Facility: CLINIC | Age: 51
End: 2021-01-06
Payer: MEDICARE

## 2021-01-06 VITALS
SYSTOLIC BLOOD PRESSURE: 127 MMHG | RESPIRATION RATE: 18 BRPM | HEART RATE: 96 BPM | TEMPERATURE: 97 F | BODY MASS INDEX: 34.99 KG/M2 | HEIGHT: 70 IN | WEIGHT: 244.38 LBS | DIASTOLIC BLOOD PRESSURE: 90 MMHG

## 2021-01-06 DIAGNOSIS — G44.021 INTRACTABLE CHRONIC CLUSTER HEADACHE: ICD-10-CM

## 2021-01-06 DIAGNOSIS — R51.9 CHRONIC DAILY HEADACHE: Primary | ICD-10-CM

## 2021-01-06 PROCEDURE — 3074F PR MOST RECENT SYSTOLIC BLOOD PRESSURE < 130 MM HG: ICD-10-PCS | Mod: CPTII,S$GLB,, | Performed by: PSYCHIATRY & NEUROLOGY

## 2021-01-06 PROCEDURE — 1125F PR PAIN SEVERITY QUANTIFIED, PAIN PRESENT: ICD-10-PCS | Mod: S$GLB,,, | Performed by: PSYCHIATRY & NEUROLOGY

## 2021-01-06 PROCEDURE — 99999 PR PBB SHADOW E&M-EST. PATIENT-LVL IV: CPT | Mod: PBBFAC,,, | Performed by: PSYCHIATRY & NEUROLOGY

## 2021-01-06 PROCEDURE — 99215 PR OFFICE/OUTPT VISIT, EST, LEVL V, 40-54 MIN: ICD-10-PCS | Mod: S$GLB,,, | Performed by: PSYCHIATRY & NEUROLOGY

## 2021-01-06 PROCEDURE — 99215 OFFICE O/P EST HI 40 MIN: CPT | Mod: S$GLB,,, | Performed by: PSYCHIATRY & NEUROLOGY

## 2021-01-06 PROCEDURE — 3008F BODY MASS INDEX DOCD: CPT | Mod: CPTII,S$GLB,, | Performed by: PSYCHIATRY & NEUROLOGY

## 2021-01-06 PROCEDURE — 1125F AMNT PAIN NOTED PAIN PRSNT: CPT | Mod: S$GLB,,, | Performed by: PSYCHIATRY & NEUROLOGY

## 2021-01-06 PROCEDURE — 3008F PR BODY MASS INDEX (BMI) DOCUMENTED: ICD-10-PCS | Mod: CPTII,S$GLB,, | Performed by: PSYCHIATRY & NEUROLOGY

## 2021-01-06 PROCEDURE — 3080F PR MOST RECENT DIASTOLIC BLOOD PRESSURE >= 90 MM HG: ICD-10-PCS | Mod: CPTII,S$GLB,, | Performed by: PSYCHIATRY & NEUROLOGY

## 2021-01-06 PROCEDURE — 99999 PR PBB SHADOW E&M-EST. PATIENT-LVL IV: ICD-10-PCS | Mod: PBBFAC,,, | Performed by: PSYCHIATRY & NEUROLOGY

## 2021-01-06 PROCEDURE — 3080F DIAST BP >= 90 MM HG: CPT | Mod: CPTII,S$GLB,, | Performed by: PSYCHIATRY & NEUROLOGY

## 2021-01-06 PROCEDURE — 3074F SYST BP LT 130 MM HG: CPT | Mod: CPTII,S$GLB,, | Performed by: PSYCHIATRY & NEUROLOGY

## 2021-01-06 RX ORDER — ERENUMAB-AOOE 140 MG/ML
140 INJECTION, SOLUTION SUBCUTANEOUS
Qty: 1 ML | Refills: 0 | Status: SHIPPED | OUTPATIENT
Start: 2021-01-06 | End: 2021-03-12 | Stop reason: SDUPTHER

## 2021-01-06 RX ORDER — TRAZODONE HYDROCHLORIDE 100 MG/1
TABLET ORAL
COMMUNITY
End: 2021-11-29 | Stop reason: SDUPTHER

## 2021-01-06 RX ORDER — PHENYLPROPANOLAMINE/CLEMASTINE 75-1.34MG
800 TABLET, EXTENDED RELEASE ORAL
COMMUNITY
Start: 2020-12-08 | End: 2021-04-06

## 2021-01-12 ENCOUNTER — TELEPHONE (OUTPATIENT)
Dept: PHARMACY | Facility: CLINIC | Age: 51
End: 2021-01-12

## 2021-01-23 ENCOUNTER — PATIENT MESSAGE (OUTPATIENT)
Dept: NEUROLOGY | Facility: CLINIC | Age: 51
End: 2021-01-23

## 2021-01-25 ENCOUNTER — PATIENT MESSAGE (OUTPATIENT)
Dept: NEUROLOGY | Facility: CLINIC | Age: 51
End: 2021-01-25

## 2021-01-25 ENCOUNTER — TELEPHONE (OUTPATIENT)
Dept: NEUROLOGY | Facility: CLINIC | Age: 51
End: 2021-01-25

## 2021-01-26 ENCOUNTER — OFFICE VISIT (OUTPATIENT)
Dept: NEUROLOGY | Facility: CLINIC | Age: 51
End: 2021-01-26
Payer: MEDICARE

## 2021-01-26 ENCOUNTER — TELEPHONE (OUTPATIENT)
Dept: NEUROLOGY | Facility: CLINIC | Age: 51
End: 2021-01-26

## 2021-01-26 DIAGNOSIS — Z51.81 MEDICATION MONITORING ENCOUNTER: ICD-10-CM

## 2021-01-26 DIAGNOSIS — G44.021 INTRACTABLE CHRONIC CLUSTER HEADACHE: Primary | ICD-10-CM

## 2021-01-26 PROCEDURE — 99215 PR OFFICE/OUTPT VISIT, EST, LEVL V, 40-54 MIN: ICD-10-PCS | Mod: 95,,, | Performed by: PSYCHIATRY & NEUROLOGY

## 2021-01-26 PROCEDURE — 99215 OFFICE O/P EST HI 40 MIN: CPT | Mod: 95,,, | Performed by: PSYCHIATRY & NEUROLOGY

## 2021-01-26 RX ORDER — CLOMIPHENE CITRATE 50 MG/1
TABLET ORAL
Qty: 60 TABLET | Refills: 3 | Status: SHIPPED | OUTPATIENT
Start: 2021-01-26 | End: 2021-04-06 | Stop reason: SDUPTHER

## 2021-01-27 ENCOUNTER — PATIENT MESSAGE (OUTPATIENT)
Dept: NEUROLOGY | Facility: CLINIC | Age: 51
End: 2021-01-27

## 2021-02-05 ENCOUNTER — PATIENT MESSAGE (OUTPATIENT)
Dept: PHARMACY | Facility: CLINIC | Age: 51
End: 2021-02-05

## 2021-02-25 ENCOUNTER — PATIENT MESSAGE (OUTPATIENT)
Dept: NEUROLOGY | Facility: CLINIC | Age: 51
End: 2021-02-25

## 2021-03-12 DIAGNOSIS — R51.9 CHRONIC DAILY HEADACHE: ICD-10-CM

## 2021-03-12 RX ORDER — ERENUMAB-AOOE 140 MG/ML
140 INJECTION, SOLUTION SUBCUTANEOUS
Qty: 1 ML | Refills: 6 | Status: SHIPPED | OUTPATIENT
Start: 2021-03-12 | End: 2021-04-06

## 2021-03-31 ENCOUNTER — PATIENT MESSAGE (OUTPATIENT)
Dept: NEUROLOGY | Facility: CLINIC | Age: 51
End: 2021-03-31

## 2021-03-31 ENCOUNTER — TELEPHONE (OUTPATIENT)
Dept: NEUROLOGY | Facility: CLINIC | Age: 51
End: 2021-03-31

## 2021-04-06 ENCOUNTER — OFFICE VISIT (OUTPATIENT)
Dept: NEUROLOGY | Facility: CLINIC | Age: 51
End: 2021-04-06
Payer: MEDICARE

## 2021-04-06 VITALS
BODY MASS INDEX: 36.26 KG/M2 | HEART RATE: 81 BPM | TEMPERATURE: 98 F | DIASTOLIC BLOOD PRESSURE: 76 MMHG | HEIGHT: 70 IN | RESPIRATION RATE: 17 BRPM | WEIGHT: 253.31 LBS | SYSTOLIC BLOOD PRESSURE: 116 MMHG

## 2021-04-06 DIAGNOSIS — Z79.891 CHRONIC PRESCRIPTION OPIATE USE: ICD-10-CM

## 2021-04-06 DIAGNOSIS — R51.9 LEFT-SIDED HEADACHE: ICD-10-CM

## 2021-04-06 DIAGNOSIS — T88.7XXA MEDICATION SIDE EFFECTS: ICD-10-CM

## 2021-04-06 DIAGNOSIS — G47.33 OSA (OBSTRUCTIVE SLEEP APNEA): ICD-10-CM

## 2021-04-06 DIAGNOSIS — F41.9 ANXIETY: ICD-10-CM

## 2021-04-06 DIAGNOSIS — R42 VERTIGO: ICD-10-CM

## 2021-04-06 DIAGNOSIS — R41.0 CONFUSION: ICD-10-CM

## 2021-04-06 DIAGNOSIS — G44.021 INTRACTABLE CHRONIC CLUSTER HEADACHE: Primary | ICD-10-CM

## 2021-04-06 PROCEDURE — 3008F BODY MASS INDEX DOCD: CPT | Mod: CPTII,S$GLB,, | Performed by: PSYCHIATRY & NEUROLOGY

## 2021-04-06 PROCEDURE — 3078F DIAST BP <80 MM HG: CPT | Mod: CPTII,S$GLB,, | Performed by: PSYCHIATRY & NEUROLOGY

## 2021-04-06 PROCEDURE — 99417 PR PROLONGED SVC, OUTPT, W/WO DIRECT PT CONTACT,  EA ADDTL 15 MIN: ICD-10-PCS | Mod: S$GLB,,, | Performed by: PSYCHIATRY & NEUROLOGY

## 2021-04-06 PROCEDURE — 99999 PR PBB SHADOW E&M-EST. PATIENT-LVL V: ICD-10-PCS | Mod: PBBFAC,,, | Performed by: PSYCHIATRY & NEUROLOGY

## 2021-04-06 PROCEDURE — 3074F PR MOST RECENT SYSTOLIC BLOOD PRESSURE < 130 MM HG: ICD-10-PCS | Mod: CPTII,S$GLB,, | Performed by: PSYCHIATRY & NEUROLOGY

## 2021-04-06 PROCEDURE — 99417 PROLNG OP E/M EACH 15 MIN: CPT | Mod: S$GLB,,, | Performed by: PSYCHIATRY & NEUROLOGY

## 2021-04-06 PROCEDURE — 99215 OFFICE O/P EST HI 40 MIN: CPT | Mod: S$GLB,,, | Performed by: PSYCHIATRY & NEUROLOGY

## 2021-04-06 PROCEDURE — 1125F AMNT PAIN NOTED PAIN PRSNT: CPT | Mod: S$GLB,,, | Performed by: PSYCHIATRY & NEUROLOGY

## 2021-04-06 PROCEDURE — 3008F PR BODY MASS INDEX (BMI) DOCUMENTED: ICD-10-PCS | Mod: CPTII,S$GLB,, | Performed by: PSYCHIATRY & NEUROLOGY

## 2021-04-06 PROCEDURE — 99999 PR PBB SHADOW E&M-EST. PATIENT-LVL V: CPT | Mod: PBBFAC,,, | Performed by: PSYCHIATRY & NEUROLOGY

## 2021-04-06 PROCEDURE — 3078F PR MOST RECENT DIASTOLIC BLOOD PRESSURE < 80 MM HG: ICD-10-PCS | Mod: CPTII,S$GLB,, | Performed by: PSYCHIATRY & NEUROLOGY

## 2021-04-06 PROCEDURE — 3074F SYST BP LT 130 MM HG: CPT | Mod: CPTII,S$GLB,, | Performed by: PSYCHIATRY & NEUROLOGY

## 2021-04-06 PROCEDURE — 99215 PR OFFICE/OUTPT VISIT, EST, LEVL V, 40-54 MIN: ICD-10-PCS | Mod: S$GLB,,, | Performed by: PSYCHIATRY & NEUROLOGY

## 2021-04-06 PROCEDURE — 1125F PR PAIN SEVERITY QUANTIFIED, PAIN PRESENT: ICD-10-PCS | Mod: S$GLB,,, | Performed by: PSYCHIATRY & NEUROLOGY

## 2021-04-06 RX ORDER — LIDOCAINE HYDROCHLORIDE 20 MG/ML
SOLUTION OROPHARYNGEAL 2 TIMES DAILY PRN
Qty: 100 ML | Refills: 3 | Status: SHIPPED | OUTPATIENT
Start: 2021-04-06 | End: 2021-06-13 | Stop reason: SDUPTHER

## 2021-04-06 RX ORDER — CLOMIPHENE CITRATE 50 MG/1
TABLET ORAL
Qty: 60 TABLET | Refills: 3 | Status: SHIPPED | OUTPATIENT
Start: 2021-04-06 | End: 2021-04-09 | Stop reason: SDUPTHER

## 2021-04-09 ENCOUNTER — PATIENT MESSAGE (OUTPATIENT)
Dept: NEUROLOGY | Facility: CLINIC | Age: 51
End: 2021-04-09

## 2021-04-09 RX ORDER — CLOMIPHENE CITRATE 50 MG/1
TABLET ORAL
Qty: 60 TABLET | Refills: 3 | Status: SHIPPED | OUTPATIENT
Start: 2021-04-09 | End: 2021-08-20

## 2021-04-12 ENCOUNTER — TELEPHONE (OUTPATIENT)
Dept: PHARMACY | Facility: CLINIC | Age: 51
End: 2021-04-12

## 2021-04-13 ENCOUNTER — HOSPITAL ENCOUNTER (OUTPATIENT)
Dept: RADIOLOGY | Facility: HOSPITAL | Age: 51
Discharge: HOME | End: 2021-04-13
Attending: PSYCHIATRY & NEUROLOGY
Payer: MEDICARE

## 2021-04-13 DIAGNOSIS — R41.0 CONFUSION: ICD-10-CM

## 2021-04-13 DIAGNOSIS — R42 VERTIGO: ICD-10-CM

## 2021-04-13 PROCEDURE — 70553 MRI BRAIN W WO CONTRAST: ICD-10-PCS | Mod: 26,,, | Performed by: RADIOLOGY

## 2021-04-13 PROCEDURE — 70553 MRI BRAIN STEM W/O & W/DYE: CPT | Mod: TC,PO

## 2021-04-13 PROCEDURE — 25500020 PHARM REV CODE 255: Mod: PO | Performed by: PSYCHIATRY & NEUROLOGY

## 2021-04-13 PROCEDURE — A9585 GADOBUTROL INJECTION: HCPCS | Mod: PO | Performed by: PSYCHIATRY & NEUROLOGY

## 2021-04-13 PROCEDURE — 70553 MRI BRAIN STEM W/O & W/DYE: CPT | Mod: 26,,, | Performed by: RADIOLOGY

## 2021-04-13 RX ORDER — GADOBUTROL 604.72 MG/ML
10 INJECTION INTRAVENOUS
Status: COMPLETED | OUTPATIENT
Start: 2021-04-13 | End: 2021-04-13

## 2021-04-13 RX ADMIN — GADOBUTROL 10 ML: 604.72 INJECTION INTRAVENOUS at 10:04

## 2021-04-14 ENCOUNTER — PATIENT MESSAGE (OUTPATIENT)
Dept: NEUROLOGY | Facility: CLINIC | Age: 51
End: 2021-04-14

## 2021-04-15 ENCOUNTER — PATIENT MESSAGE (OUTPATIENT)
Dept: NEUROLOGY | Facility: CLINIC | Age: 51
End: 2021-04-15

## 2021-04-15 DIAGNOSIS — F41.9 ANXIETY: ICD-10-CM

## 2021-04-15 DIAGNOSIS — F32.A DEPRESSION, UNSPECIFIED DEPRESSION TYPE: Primary | ICD-10-CM

## 2021-04-16 ENCOUNTER — TELEPHONE (OUTPATIENT)
Dept: NEUROLOGY | Facility: CLINIC | Age: 51
End: 2021-04-16

## 2021-04-19 ENCOUNTER — TELEPHONE (OUTPATIENT)
Dept: NEUROLOGY | Facility: CLINIC | Age: 51
End: 2021-04-19

## 2021-04-19 ENCOUNTER — TELEPHONE (OUTPATIENT)
Dept: PHARMACY | Facility: CLINIC | Age: 51
End: 2021-04-19

## 2021-04-22 ENCOUNTER — PATIENT MESSAGE (OUTPATIENT)
Dept: PHARMACY | Facility: CLINIC | Age: 51
End: 2021-04-22

## 2021-04-28 ENCOUNTER — PATIENT MESSAGE (OUTPATIENT)
Dept: NEUROLOGY | Facility: CLINIC | Age: 51
End: 2021-04-28

## 2021-06-01 ENCOUNTER — PATIENT MESSAGE (OUTPATIENT)
Dept: NEUROLOGY | Facility: CLINIC | Age: 51
End: 2021-06-01

## 2021-06-01 DIAGNOSIS — G89.4 CHRONIC PAIN SYNDROME: Primary | ICD-10-CM

## 2021-06-03 ENCOUNTER — TELEPHONE (OUTPATIENT)
Dept: NEUROLOGY | Facility: CLINIC | Age: 51
End: 2021-06-03

## 2021-06-03 ENCOUNTER — PATIENT MESSAGE (OUTPATIENT)
Dept: NEUROLOGY | Facility: CLINIC | Age: 51
End: 2021-06-03

## 2021-06-05 ENCOUNTER — PATIENT MESSAGE (OUTPATIENT)
Dept: PAIN MEDICINE | Facility: CLINIC | Age: 51
End: 2021-06-05

## 2021-06-14 RX ORDER — LIDOCAINE HYDROCHLORIDE 20 MG/ML
SOLUTION OROPHARYNGEAL 2 TIMES DAILY PRN
Qty: 100 ML | Refills: 3 | Status: SHIPPED | OUTPATIENT
Start: 2021-06-14

## 2021-06-21 ENCOUNTER — OFFICE VISIT (OUTPATIENT)
Dept: DERMATOLOGY | Facility: CLINIC | Age: 51
End: 2021-06-21
Payer: MEDICARE

## 2021-06-21 DIAGNOSIS — L82.1 SEBORRHEIC KERATOSIS: Primary | ICD-10-CM

## 2021-06-21 PROCEDURE — 99202 OFFICE O/P NEW SF 15 MIN: CPT | Mod: S$GLB,,, | Performed by: DERMATOLOGY

## 2021-06-21 PROCEDURE — 1126F AMNT PAIN NOTED NONE PRSNT: CPT | Mod: S$GLB,,, | Performed by: DERMATOLOGY

## 2021-06-21 PROCEDURE — 99999 PR PBB SHADOW E&M-EST. PATIENT-LVL IV: CPT | Mod: PBBFAC,,, | Performed by: DERMATOLOGY

## 2021-06-21 PROCEDURE — 99999 PR PBB SHADOW E&M-EST. PATIENT-LVL IV: ICD-10-PCS | Mod: PBBFAC,,, | Performed by: DERMATOLOGY

## 2021-06-21 PROCEDURE — 1126F PR PAIN SEVERITY QUANTIFIED, NO PAIN PRESENT: ICD-10-PCS | Mod: S$GLB,,, | Performed by: DERMATOLOGY

## 2021-06-21 PROCEDURE — 99202 PR OFFICE/OUTPT VISIT, NEW, LEVL II, 15-29 MIN: ICD-10-PCS | Mod: S$GLB,,, | Performed by: DERMATOLOGY

## 2021-08-20 ENCOUNTER — OFFICE VISIT (OUTPATIENT)
Dept: CARDIOLOGY | Facility: CLINIC | Age: 51
End: 2021-08-20
Payer: MEDICARE

## 2021-08-20 VITALS
HEIGHT: 70 IN | DIASTOLIC BLOOD PRESSURE: 87 MMHG | BODY MASS INDEX: 36.94 KG/M2 | WEIGHT: 258 LBS | SYSTOLIC BLOOD PRESSURE: 122 MMHG | OXYGEN SATURATION: 97 % | HEART RATE: 88 BPM

## 2021-08-20 DIAGNOSIS — I10 ESSENTIAL HYPERTENSION: ICD-10-CM

## 2021-08-20 DIAGNOSIS — E66.01 MORBID OBESITY: ICD-10-CM

## 2021-08-20 DIAGNOSIS — E78.00 HYPERCHOLESTEROLEMIA: ICD-10-CM

## 2021-08-20 DIAGNOSIS — R00.0 TACHYCARDIA: Primary | ICD-10-CM

## 2021-08-20 DIAGNOSIS — I10 ESSENTIAL (PRIMARY) HYPERTENSION: ICD-10-CM

## 2021-08-20 DIAGNOSIS — R00.2 PALPITATIONS: ICD-10-CM

## 2021-08-20 PROCEDURE — 3079F DIAST BP 80-89 MM HG: CPT | Mod: CPTII,S$GLB,, | Performed by: INTERNAL MEDICINE

## 2021-08-20 PROCEDURE — 3008F BODY MASS INDEX DOCD: CPT | Mod: CPTII,S$GLB,, | Performed by: INTERNAL MEDICINE

## 2021-08-20 PROCEDURE — 1125F AMNT PAIN NOTED PAIN PRSNT: CPT | Mod: CPTII,S$GLB,, | Performed by: INTERNAL MEDICINE

## 2021-08-20 PROCEDURE — 3079F PR MOST RECENT DIASTOLIC BLOOD PRESSURE 80-89 MM HG: ICD-10-PCS | Mod: CPTII,S$GLB,, | Performed by: INTERNAL MEDICINE

## 2021-08-20 PROCEDURE — 99204 PR OFFICE/OUTPT VISIT, NEW, LEVL IV, 45-59 MIN: ICD-10-PCS | Mod: S$GLB,,, | Performed by: INTERNAL MEDICINE

## 2021-08-20 PROCEDURE — 1159F PR MEDICATION LIST DOCUMENTED IN MEDICAL RECORD: ICD-10-PCS | Mod: CPTII,S$GLB,, | Performed by: INTERNAL MEDICINE

## 2021-08-20 PROCEDURE — 3074F SYST BP LT 130 MM HG: CPT | Mod: CPTII,S$GLB,, | Performed by: INTERNAL MEDICINE

## 2021-08-20 PROCEDURE — 99204 OFFICE O/P NEW MOD 45 MIN: CPT | Mod: S$GLB,,, | Performed by: INTERNAL MEDICINE

## 2021-08-20 PROCEDURE — 99999 PR PBB SHADOW E&M-EST. PATIENT-LVL V: ICD-10-PCS | Mod: PBBFAC,,, | Performed by: INTERNAL MEDICINE

## 2021-08-20 PROCEDURE — 1159F MED LIST DOCD IN RCRD: CPT | Mod: CPTII,S$GLB,, | Performed by: INTERNAL MEDICINE

## 2021-08-20 PROCEDURE — 3008F PR BODY MASS INDEX (BMI) DOCUMENTED: ICD-10-PCS | Mod: CPTII,S$GLB,, | Performed by: INTERNAL MEDICINE

## 2021-08-20 PROCEDURE — 1125F PR PAIN SEVERITY QUANTIFIED, PAIN PRESENT: ICD-10-PCS | Mod: CPTII,S$GLB,, | Performed by: INTERNAL MEDICINE

## 2021-08-20 PROCEDURE — 3074F PR MOST RECENT SYSTOLIC BLOOD PRESSURE < 130 MM HG: ICD-10-PCS | Mod: CPTII,S$GLB,, | Performed by: INTERNAL MEDICINE

## 2021-08-20 PROCEDURE — 99999 PR PBB SHADOW E&M-EST. PATIENT-LVL V: CPT | Mod: PBBFAC,,, | Performed by: INTERNAL MEDICINE

## 2021-08-20 RX ORDER — AMLODIPINE BESYLATE 10 MG/1
10 TABLET ORAL DAILY
Qty: 30 TABLET | Refills: 5 | Status: SHIPPED | OUTPATIENT
Start: 2021-08-20 | End: 2022-03-10 | Stop reason: SDUPTHER

## 2021-08-20 RX ORDER — LISINOPRIL 40 MG/1
TABLET ORAL
COMMUNITY
Start: 2021-07-06

## 2021-08-24 ENCOUNTER — LAB VISIT (OUTPATIENT)
Dept: LAB | Facility: HOSPITAL | Age: 51
End: 2021-08-24
Attending: INTERNAL MEDICINE
Payer: MEDICARE

## 2021-08-24 DIAGNOSIS — E78.00 HYPERCHOLESTEROLEMIA: ICD-10-CM

## 2021-08-24 DIAGNOSIS — I10 ESSENTIAL HYPERTENSION: ICD-10-CM

## 2021-08-24 LAB
ALBUMIN SERPL BCP-MCNC: 4.3 G/DL (ref 3.5–5.2)
ALP SERPL-CCNC: 29 U/L (ref 55–135)
ALT SERPL W/O P-5'-P-CCNC: 38 U/L (ref 10–44)
ANION GAP SERPL CALC-SCNC: 10 MMOL/L (ref 8–16)
AST SERPL-CCNC: 27 U/L (ref 10–40)
BILIRUB SERPL-MCNC: 0.3 MG/DL (ref 0.1–1)
BUN SERPL-MCNC: 16 MG/DL (ref 6–20)
CALCIUM SERPL-MCNC: 9.7 MG/DL (ref 8.7–10.5)
CHLORIDE SERPL-SCNC: 101 MMOL/L (ref 95–110)
CHOLEST SERPL-MCNC: 159 MG/DL (ref 120–199)
CHOLEST/HDLC SERPL: 4.1 {RATIO} (ref 2–5)
CO2 SERPL-SCNC: 31 MMOL/L (ref 23–29)
CREAT SERPL-MCNC: 1.2 MG/DL (ref 0.5–1.4)
EST. GFR  (AFRICAN AMERICAN): >60 ML/MIN/1.73 M^2
EST. GFR  (NON AFRICAN AMERICAN): >60 ML/MIN/1.73 M^2
GLUCOSE SERPL-MCNC: 91 MG/DL (ref 70–110)
HDLC SERPL-MCNC: 39 MG/DL (ref 40–75)
HDLC SERPL: 24.5 % (ref 20–50)
LDLC SERPL CALC-MCNC: 77.4 MG/DL (ref 63–159)
NONHDLC SERPL-MCNC: 120 MG/DL
POTASSIUM SERPL-SCNC: 3.8 MMOL/L (ref 3.5–5.1)
PROT SERPL-MCNC: 6.6 G/DL (ref 6–8.4)
SODIUM SERPL-SCNC: 142 MMOL/L (ref 136–145)
TRIGL SERPL-MCNC: 213 MG/DL (ref 30–150)

## 2021-08-24 PROCEDURE — 80053 COMPREHEN METABOLIC PANEL: CPT | Performed by: INTERNAL MEDICINE

## 2021-08-24 PROCEDURE — 36415 COLL VENOUS BLD VENIPUNCTURE: CPT | Performed by: INTERNAL MEDICINE

## 2021-08-24 PROCEDURE — 80061 LIPID PANEL: CPT | Performed by: INTERNAL MEDICINE

## 2021-08-25 ENCOUNTER — TELEPHONE (OUTPATIENT)
Dept: CARDIOLOGY | Facility: CLINIC | Age: 51
End: 2021-08-25

## 2021-09-09 ENCOUNTER — HOSPITAL ENCOUNTER (OUTPATIENT)
Dept: CARDIOLOGY | Facility: HOSPITAL | Age: 51
Discharge: HOME | End: 2021-09-09
Attending: INTERNAL MEDICINE
Payer: MEDICARE

## 2021-09-09 VITALS
WEIGHT: 258 LBS | HEIGHT: 70 IN | DIASTOLIC BLOOD PRESSURE: 87 MMHG | SYSTOLIC BLOOD PRESSURE: 122 MMHG | BODY MASS INDEX: 36.94 KG/M2

## 2021-09-09 DIAGNOSIS — I10 ESSENTIAL HYPERTENSION: ICD-10-CM

## 2021-09-09 DIAGNOSIS — I10 ESSENTIAL (PRIMARY) HYPERTENSION: ICD-10-CM

## 2021-09-09 DIAGNOSIS — E78.00 HYPERCHOLESTEROLEMIA: ICD-10-CM

## 2021-09-09 DIAGNOSIS — R00.0 TACHYCARDIA: ICD-10-CM

## 2021-09-09 LAB
AORTIC ROOT ANNULUS: 4.12 CM
AV INDEX (PROSTH): 0.98
AV MEAN GRADIENT: 2 MMHG
AV PEAK GRADIENT: 4 MMHG
AV VALVE AREA: 2.96 CM2
AV VELOCITY RATIO: 0.88
BSA FOR ECHO PROCEDURE: 2.4 M2
CV ECHO LV RWT: 0.46 CM
DOP CALC AO PEAK VEL: 1 M/S
DOP CALC AO VTI: 19.77 CM
DOP CALC LVOT AREA: 3 CM2
DOP CALC LVOT DIAMETER: 1.96 CM
DOP CALC LVOT PEAK VEL: 0.88 M/S
DOP CALC LVOT STROKE VOLUME: 58.44 CM3
DOP CALC MV VTI: 29.26 CM
DOP CALC RVOT PEAK VEL: 0.71 M/S
DOP CALC RVOT VTI: 24.16 CM
DOP CALCLVOT PEAK VEL VTI: 19.38 CM
E WAVE DECELERATION TIME: 246.13 MSEC
E/A RATIO: 1.33
E/E' RATIO: 8.4 M/S
ECHO LV POSTERIOR WALL: 1.11 CM (ref 0.6–1.1)
EJECTION FRACTION: 60 %
FRACTIONAL SHORTENING: 29 % (ref 28–44)
INTERVENTRICULAR SEPTUM: 1.15 CM (ref 0.6–1.1)
IVRT: 94.2 MSEC
LA MAJOR: 4.75 CM
LA MINOR: 4.27 CM
LA WIDTH: 3.48 CM
LEFT ATRIUM SIZE: 3.57 CM
LEFT ATRIUM VOLUME INDEX: 20.4 ML/M2
LEFT ATRIUM VOLUME: 47.49 CM3
LEFT INTERNAL DIMENSION IN SYSTOLE: 3.44 CM (ref 2.1–4)
LEFT VENTRICLE DIASTOLIC VOLUME INDEX: 47.32 ML/M2
LEFT VENTRICLE DIASTOLIC VOLUME: 110.25 ML
LEFT VENTRICLE MASS INDEX: 88 G/M2
LEFT VENTRICLE SYSTOLIC VOLUME INDEX: 21 ML/M2
LEFT VENTRICLE SYSTOLIC VOLUME: 48.96 ML
LEFT VENTRICULAR INTERNAL DIMENSION IN DIASTOLE: 4.85 CM (ref 3.5–6)
LEFT VENTRICULAR MASS: 204.72 G
LV LATERAL E/E' RATIO: 8.4 M/S
LV SEPTAL E/E' RATIO: 8.4 M/S
MV A" WAVE DURATION": 9.99 MSEC
MV MEAN GRADIENT: 0 MMHG
MV PEAK A VEL: 0.63 M/S
MV PEAK E VEL: 0.84 M/S
MV PEAK GRADIENT: 3 MMHG
MV VALVE AREA BY CONTINUITY EQUATION: 2 CM2
PISA TR MAX VEL: 2.42 M/S
PULM VEIN S/D RATIO: 1.44
PV MEAN GRADIENT: 1 MMHG
PV PEAK D VEL: 0.41 M/S
PV PEAK S VEL: 0.59 M/S
PV PEAK VELOCITY: 0.94 CM/S
RA MAJOR: 4.59 CM
RA WIDTH: 3.57 CM
RIGHT VENTRICULAR END-DIASTOLIC DIMENSION: 2.52 CM
SINUS: 3.04 CM
STJ: 2.82 CM
TDI LATERAL: 0.1 M/S
TDI SEPTAL: 0.1 M/S
TDI: 0.1 M/S
TR MAX PG: 23 MMHG

## 2021-09-09 PROCEDURE — 93227 HOLTER MONITOR - 48 HOUR (CUPID ONLY): ICD-10-PCS | Mod: ,,, | Performed by: INTERNAL MEDICINE

## 2021-09-09 PROCEDURE — 93227 XTRNL ECG REC<48 HR R&I: CPT | Mod: ,,, | Performed by: INTERNAL MEDICINE

## 2021-09-09 PROCEDURE — 93306 ECHO (CUPID ONLY): ICD-10-PCS | Mod: 26,,, | Performed by: INTERNAL MEDICINE

## 2021-09-09 PROCEDURE — 93225 XTRNL ECG REC<48 HRS REC: CPT

## 2021-09-09 PROCEDURE — 93306 TTE W/DOPPLER COMPLETE: CPT | Mod: 26,,, | Performed by: INTERNAL MEDICINE

## 2021-09-09 PROCEDURE — 93306 TTE W/DOPPLER COMPLETE: CPT

## 2021-09-10 ENCOUNTER — TELEPHONE (OUTPATIENT)
Dept: CARDIOLOGY | Facility: CLINIC | Age: 51
End: 2021-09-10

## 2021-09-13 LAB
OHS CV EVENT MONITOR DAY: 2
OHS CV HOLTER LENGTH DECIMAL HOURS: 96
OHS CV HOLTER LENGTH HOURS: 48
OHS CV HOLTER LENGTH MINUTES: 0
OHS CV HOLTER SINUS AVERAGE HR: 80
OHS CV HOLTER SINUS MAX HR: 140
OHS CV HOLTER SINUS MIN HR: 55

## 2021-09-14 ENCOUNTER — TELEPHONE (OUTPATIENT)
Dept: CARDIOLOGY | Facility: CLINIC | Age: 51
End: 2021-09-14

## 2021-09-23 ENCOUNTER — OFFICE VISIT (OUTPATIENT)
Dept: PSYCHIATRY | Facility: CLINIC | Age: 51
End: 2021-09-23
Payer: COMMERCIAL

## 2021-09-23 DIAGNOSIS — F41.9 ANXIETY: Primary | ICD-10-CM

## 2021-09-23 PROCEDURE — 4010F ACE/ARB THERAPY RXD/TAKEN: CPT | Mod: CPTII,S$GLB,, | Performed by: SOCIAL WORKER

## 2021-09-23 PROCEDURE — 90791 PR PSYCHIATRIC DIAGNOSTIC EVALUATION: ICD-10-PCS | Mod: S$GLB,,, | Performed by: SOCIAL WORKER

## 2021-09-23 PROCEDURE — 99999 PR PBB SHADOW E&M-EST. PATIENT-LVL I: ICD-10-PCS | Mod: PBBFAC,,, | Performed by: SOCIAL WORKER

## 2021-09-23 PROCEDURE — 99999 PR PBB SHADOW E&M-EST. PATIENT-LVL I: CPT | Mod: PBBFAC,,, | Performed by: SOCIAL WORKER

## 2021-09-23 PROCEDURE — 4010F PR ACE/ARB THEARPY RXD/TAKEN: ICD-10-PCS | Mod: CPTII,S$GLB,, | Performed by: SOCIAL WORKER

## 2021-09-23 PROCEDURE — 90791 PSYCH DIAGNOSTIC EVALUATION: CPT | Mod: S$GLB,,, | Performed by: SOCIAL WORKER

## 2021-09-27 DIAGNOSIS — I10 ESSENTIAL HYPERTENSION: Primary | ICD-10-CM

## 2021-09-28 ENCOUNTER — OFFICE VISIT (OUTPATIENT)
Dept: CARDIOLOGY | Facility: CLINIC | Age: 51
End: 2021-09-28
Payer: MEDICARE

## 2021-09-28 ENCOUNTER — HOSPITAL ENCOUNTER (OUTPATIENT)
Dept: CARDIOLOGY | Facility: HOSPITAL | Age: 51
Discharge: HOME | End: 2021-09-28
Attending: INTERNAL MEDICINE
Payer: MEDICARE

## 2021-09-28 VITALS
OXYGEN SATURATION: 98 % | BODY MASS INDEX: 35.98 KG/M2 | SYSTOLIC BLOOD PRESSURE: 114 MMHG | HEART RATE: 101 BPM | HEIGHT: 70 IN | DIASTOLIC BLOOD PRESSURE: 80 MMHG | RESPIRATION RATE: 16 BRPM | WEIGHT: 251.31 LBS

## 2021-09-28 DIAGNOSIS — I10 ESSENTIAL (PRIMARY) HYPERTENSION: ICD-10-CM

## 2021-09-28 DIAGNOSIS — R00.0 TACHYCARDIA: ICD-10-CM

## 2021-09-28 DIAGNOSIS — I10 ESSENTIAL HYPERTENSION: Primary | ICD-10-CM

## 2021-09-28 DIAGNOSIS — I10 ESSENTIAL HYPERTENSION: ICD-10-CM

## 2021-09-28 DIAGNOSIS — E78.00 HYPERCHOLESTEROLEMIA: ICD-10-CM

## 2021-09-28 DIAGNOSIS — E66.01 MORBID OBESITY: ICD-10-CM

## 2021-09-28 PROCEDURE — 1159F MED LIST DOCD IN RCRD: CPT | Mod: CPTII,S$GLB,, | Performed by: INTERNAL MEDICINE

## 2021-09-28 PROCEDURE — 1159F PR MEDICATION LIST DOCUMENTED IN MEDICAL RECORD: ICD-10-PCS | Mod: CPTII,S$GLB,, | Performed by: INTERNAL MEDICINE

## 2021-09-28 PROCEDURE — 93010 ELECTROCARDIOGRAM REPORT: CPT | Mod: ,,, | Performed by: INTERNAL MEDICINE

## 2021-09-28 PROCEDURE — 3008F PR BODY MASS INDEX (BMI) DOCUMENTED: ICD-10-PCS | Mod: CPTII,S$GLB,, | Performed by: INTERNAL MEDICINE

## 2021-09-28 PROCEDURE — 93010 EKG 12-LEAD: ICD-10-PCS | Mod: ,,, | Performed by: INTERNAL MEDICINE

## 2021-09-28 PROCEDURE — 99214 PR OFFICE/OUTPT VISIT, EST, LEVL IV, 30-39 MIN: ICD-10-PCS | Mod: S$GLB,,, | Performed by: INTERNAL MEDICINE

## 2021-09-28 PROCEDURE — 3008F BODY MASS INDEX DOCD: CPT | Mod: CPTII,S$GLB,, | Performed by: INTERNAL MEDICINE

## 2021-09-28 PROCEDURE — 99214 OFFICE O/P EST MOD 30 MIN: CPT | Mod: S$GLB,,, | Performed by: INTERNAL MEDICINE

## 2021-09-28 PROCEDURE — 99999 PR PBB SHADOW E&M-EST. PATIENT-LVL IV: CPT | Mod: PBBFAC,,, | Performed by: INTERNAL MEDICINE

## 2021-09-28 PROCEDURE — 3079F DIAST BP 80-89 MM HG: CPT | Mod: CPTII,S$GLB,, | Performed by: INTERNAL MEDICINE

## 2021-09-28 PROCEDURE — 99999 PR PBB SHADOW E&M-EST. PATIENT-LVL IV: ICD-10-PCS | Mod: PBBFAC,,, | Performed by: INTERNAL MEDICINE

## 2021-09-28 PROCEDURE — 3074F SYST BP LT 130 MM HG: CPT | Mod: CPTII,S$GLB,, | Performed by: INTERNAL MEDICINE

## 2021-09-28 PROCEDURE — 4010F ACE/ARB THERAPY RXD/TAKEN: CPT | Mod: CPTII,S$GLB,, | Performed by: INTERNAL MEDICINE

## 2021-09-28 PROCEDURE — 3079F PR MOST RECENT DIASTOLIC BLOOD PRESSURE 80-89 MM HG: ICD-10-PCS | Mod: CPTII,S$GLB,, | Performed by: INTERNAL MEDICINE

## 2021-09-28 PROCEDURE — 4010F PR ACE/ARB THEARPY RXD/TAKEN: ICD-10-PCS | Mod: CPTII,S$GLB,, | Performed by: INTERNAL MEDICINE

## 2021-09-28 PROCEDURE — 3074F PR MOST RECENT SYSTOLIC BLOOD PRESSURE < 130 MM HG: ICD-10-PCS | Mod: CPTII,S$GLB,, | Performed by: INTERNAL MEDICINE

## 2021-09-28 PROCEDURE — 93005 ELECTROCARDIOGRAM TRACING: CPT | Mod: PO

## 2021-10-06 ENCOUNTER — OFFICE VISIT (OUTPATIENT)
Dept: PSYCHIATRY | Facility: CLINIC | Age: 51
End: 2021-10-06
Payer: COMMERCIAL

## 2021-10-06 DIAGNOSIS — F41.9 ANXIETY: Primary | ICD-10-CM

## 2021-10-06 PROCEDURE — 90834 PSYTX W PT 45 MINUTES: CPT | Mod: S$GLB,,, | Performed by: SOCIAL WORKER

## 2021-10-06 PROCEDURE — 4010F ACE/ARB THERAPY RXD/TAKEN: CPT | Mod: CPTII,S$GLB,, | Performed by: SOCIAL WORKER

## 2021-10-06 PROCEDURE — 99999 PR PBB SHADOW E&M-EST. PATIENT-LVL I: ICD-10-PCS | Mod: PBBFAC,,, | Performed by: SOCIAL WORKER

## 2021-10-06 PROCEDURE — 1159F MED LIST DOCD IN RCRD: CPT | Mod: CPTII,S$GLB,, | Performed by: SOCIAL WORKER

## 2021-10-06 PROCEDURE — 4010F PR ACE/ARB THEARPY RXD/TAKEN: ICD-10-PCS | Mod: CPTII,S$GLB,, | Performed by: SOCIAL WORKER

## 2021-10-06 PROCEDURE — 1159F PR MEDICATION LIST DOCUMENTED IN MEDICAL RECORD: ICD-10-PCS | Mod: CPTII,S$GLB,, | Performed by: SOCIAL WORKER

## 2021-10-06 PROCEDURE — 99999 PR PBB SHADOW E&M-EST. PATIENT-LVL I: CPT | Mod: PBBFAC,,, | Performed by: SOCIAL WORKER

## 2021-10-06 PROCEDURE — 90834 PR PSYCHOTHERAPY W/PATIENT, 45 MIN: ICD-10-PCS | Mod: S$GLB,,, | Performed by: SOCIAL WORKER

## 2021-10-22 ENCOUNTER — IMMUNIZATION (OUTPATIENT)
Dept: PHARMACY | Facility: CLINIC | Age: 51
End: 2021-10-22
Payer: MEDICARE

## 2021-10-22 DIAGNOSIS — Z23 NEED FOR VACCINATION: Primary | ICD-10-CM

## 2021-11-29 ENCOUNTER — OFFICE VISIT (OUTPATIENT)
Dept: PSYCHIATRY | Facility: CLINIC | Age: 51
End: 2021-11-29
Payer: COMMERCIAL

## 2021-11-29 DIAGNOSIS — F41.1 GENERALIZED ANXIETY DISORDER WITH PANIC ATTACKS: ICD-10-CM

## 2021-11-29 DIAGNOSIS — F33.1 DEPRESSION, MAJOR, RECURRENT, MODERATE: Primary | ICD-10-CM

## 2021-11-29 DIAGNOSIS — F41.0 GENERALIZED ANXIETY DISORDER WITH PANIC ATTACKS: ICD-10-CM

## 2021-11-29 PROCEDURE — 4010F ACE/ARB THERAPY RXD/TAKEN: CPT | Mod: CPTII,S$GLB,, | Performed by: NURSE PRACTITIONER

## 2021-11-29 PROCEDURE — 99999 PR PBB SHADOW E&M-EST. PATIENT-LVL III: CPT | Mod: PBBFAC,,, | Performed by: NURSE PRACTITIONER

## 2021-11-29 PROCEDURE — 4010F PR ACE/ARB THEARPY RXD/TAKEN: ICD-10-PCS | Mod: CPTII,S$GLB,, | Performed by: NURSE PRACTITIONER

## 2021-11-29 PROCEDURE — 99999 PR PBB SHADOW E&M-EST. PATIENT-LVL III: ICD-10-PCS | Mod: PBBFAC,,, | Performed by: NURSE PRACTITIONER

## 2021-11-29 PROCEDURE — 90792 PR PSYCHIATRIC DIAGNOSTIC EVALUATION W/MEDICAL SERVICES: ICD-10-PCS | Mod: S$GLB,,, | Performed by: NURSE PRACTITIONER

## 2021-11-29 PROCEDURE — 90792 PSYCH DIAG EVAL W/MED SRVCS: CPT | Mod: S$GLB,,, | Performed by: NURSE PRACTITIONER

## 2021-11-29 RX ORDER — DESVENLAFAXINE SUCCINATE 50 MG/1
50 TABLET, EXTENDED RELEASE ORAL DAILY
Qty: 30 TABLET | Refills: 2 | Status: SHIPPED | OUTPATIENT
Start: 2021-11-29 | End: 2021-12-14 | Stop reason: SDUPTHER

## 2021-12-09 ENCOUNTER — OFFICE VISIT (OUTPATIENT)
Dept: PSYCHIATRY | Facility: CLINIC | Age: 51
End: 2021-12-09
Payer: COMMERCIAL

## 2021-12-09 DIAGNOSIS — F33.1 DEPRESSION, MAJOR, RECURRENT, MODERATE: Primary | ICD-10-CM

## 2021-12-09 DIAGNOSIS — F41.1 GENERALIZED ANXIETY DISORDER WITH PANIC ATTACKS: ICD-10-CM

## 2021-12-09 DIAGNOSIS — F41.0 GENERALIZED ANXIETY DISORDER WITH PANIC ATTACKS: ICD-10-CM

## 2021-12-09 PROCEDURE — 90834 PR PSYCHOTHERAPY W/PATIENT, 45 MIN: ICD-10-PCS | Mod: S$GLB,,, | Performed by: SOCIAL WORKER

## 2021-12-09 PROCEDURE — 90834 PSYTX W PT 45 MINUTES: CPT | Mod: S$GLB,,, | Performed by: SOCIAL WORKER

## 2021-12-09 PROCEDURE — 99999 PR PBB SHADOW E&M-EST. PATIENT-LVL I: CPT | Mod: PBBFAC,,, | Performed by: SOCIAL WORKER

## 2021-12-09 PROCEDURE — 99999 PR PBB SHADOW E&M-EST. PATIENT-LVL I: ICD-10-PCS | Mod: PBBFAC,,, | Performed by: SOCIAL WORKER

## 2021-12-12 ENCOUNTER — HOSPITAL ENCOUNTER (EMERGENCY)
Facility: HOSPITAL | Age: 51
Discharge: HOME | End: 2021-12-12
Attending: EMERGENCY MEDICINE
Payer: MEDICARE

## 2021-12-12 VITALS
TEMPERATURE: 98 F | RESPIRATION RATE: 18 BRPM | WEIGHT: 252 LBS | HEART RATE: 85 BPM | DIASTOLIC BLOOD PRESSURE: 80 MMHG | BODY MASS INDEX: 36.16 KG/M2 | OXYGEN SATURATION: 100 % | SYSTOLIC BLOOD PRESSURE: 140 MMHG

## 2021-12-12 DIAGNOSIS — S70.01XA CONTUSION OF RIGHT HIP, INITIAL ENCOUNTER: ICD-10-CM

## 2021-12-12 DIAGNOSIS — R51.9 ACUTE NONINTRACTABLE HEADACHE, UNSPECIFIED HEADACHE TYPE: Primary | ICD-10-CM
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
## 2021-12-12 PROCEDURE — 63600175 PHARM REV CODE 636 W HCPCS: Performed by: PHYSICIAN ASSISTANT

## 2021-12-12 PROCEDURE — 99284 EMERGENCY DEPT VISIT MOD MDM: CPT | Mod: 25

## 2021-12-12 PROCEDURE — 25000003 PHARM REV CODE 250: Performed by: PHYSICIAN ASSISTANT

## 2021-12-12 PROCEDURE — 96372 THER/PROPH/DIAG INJ SC/IM: CPT

## 2021-12-12 RX ORDER — HYDROMORPHONE HYDROCHLORIDE 1 MG/ML
0.5 INJECTION, SOLUTION INTRAMUSCULAR; INTRAVENOUS; SUBCUTANEOUS ONCE
Status: COMPLETED | OUTPATIENT
Start: 2021-12-12 | End: 2021-12-12

## 2021-12-12 RX ORDER — KETOROLAC TROMETHAMINE 30 MG/ML
15 INJECTION, SOLUTION INTRAMUSCULAR; INTRAVENOUS
Status: COMPLETED | OUTPATIENT
Start: 2021-12-12 | End: 2021-12-12

## 2021-12-12 RX ORDER — BUTALBITAL, ACETAMINOPHEN AND CAFFEINE 50; 325; 40 MG/1; MG/1; MG/1
2 TABLET ORAL
Status: COMPLETED | OUTPATIENT
Start: 2021-12-12 | End: 2021-12-12

## 2021-12-12 RX ADMIN — KETOROLAC TROMETHAMINE 15 MG: 30 INJECTION, SOLUTION INTRAMUSCULAR; INTRAVENOUS at 05:12

## 2021-12-12 RX ADMIN — BUTALBITAL, ACETAMINOPHEN AND CAFFEINE 2 TABLET: 50; 325; 40 TABLET ORAL at 05:12

## 2021-12-12 RX ADMIN — HYDROMORPHONE HYDROCHLORIDE 0.5 MG: 1 INJECTION, SOLUTION INTRAMUSCULAR; INTRAVENOUS; SUBCUTANEOUS at 05:12

## 2021-12-14 ENCOUNTER — OFFICE VISIT (OUTPATIENT)
Dept: PSYCHIATRY | Facility: CLINIC | Age: 51
End: 2021-12-14
Payer: COMMERCIAL

## 2021-12-14 DIAGNOSIS — F33.1 DEPRESSION, MAJOR, RECURRENT, MODERATE: Primary | ICD-10-CM

## 2021-12-14 DIAGNOSIS — F41.0 GENERALIZED ANXIETY DISORDER WITH PANIC ATTACKS: ICD-10-CM

## 2021-12-14 DIAGNOSIS — F41.1 GENERALIZED ANXIETY DISORDER WITH PANIC ATTACKS: ICD-10-CM

## 2021-12-14 DIAGNOSIS — F32.9 MAJOR DEPRESSIVE DISORDER, SINGLE EPISODE, UNSPECIFIED: ICD-10-CM

## 2021-12-14 PROCEDURE — 99999 PR PBB SHADOW E&M-EST. PATIENT-LVL III: ICD-10-PCS | Mod: PBBFAC,,, | Performed by: NURSE PRACTITIONER

## 2021-12-14 PROCEDURE — 99214 PR OFFICE/OUTPT VISIT, EST, LEVL IV, 30-39 MIN: ICD-10-PCS | Mod: S$GLB,,, | Performed by: NURSE PRACTITIONER

## 2021-12-14 PROCEDURE — 4010F PR ACE/ARB THEARPY RXD/TAKEN: ICD-10-PCS | Mod: CPTII,S$GLB,, | Performed by: NURSE PRACTITIONER

## 2021-12-14 PROCEDURE — 90833 PSYTX W PT W E/M 30 MIN: CPT | Mod: S$GLB,,, | Performed by: NURSE PRACTITIONER

## 2021-12-14 PROCEDURE — 99999 PR PBB SHADOW E&M-EST. PATIENT-LVL III: CPT | Mod: PBBFAC,,, | Performed by: NURSE PRACTITIONER

## 2021-12-14 PROCEDURE — 4010F ACE/ARB THERAPY RXD/TAKEN: CPT | Mod: CPTII,S$GLB,, | Performed by: NURSE PRACTITIONER

## 2021-12-14 PROCEDURE — 99214 OFFICE O/P EST MOD 30 MIN: CPT | Mod: S$GLB,,, | Performed by: NURSE PRACTITIONER

## 2021-12-14 PROCEDURE — 90833 PR PSYCHOTHERAPY W/PATIENT W/E&M, 30 MIN (ADD ON): ICD-10-PCS | Mod: S$GLB,,, | Performed by: NURSE PRACTITIONER

## 2021-12-14 RX ORDER — DESVENLAFAXINE SUCCINATE 50 MG/1
50 TABLET, EXTENDED RELEASE ORAL DAILY
Qty: 30 TABLET | Refills: 2 | Status: SHIPPED | OUTPATIENT
Start: 2021-12-14 | End: 2022-01-11 | Stop reason: SDUPTHER

## 2021-12-21 ENCOUNTER — OFFICE VISIT (OUTPATIENT)
Dept: CARDIOLOGY | Facility: CLINIC | Age: 51
End: 2021-12-21
Payer: MEDICARE

## 2021-12-21 VITALS
HEART RATE: 68 BPM | WEIGHT: 253.06 LBS | SYSTOLIC BLOOD PRESSURE: 110 MMHG | OXYGEN SATURATION: 96 % | DIASTOLIC BLOOD PRESSURE: 60 MMHG | BODY MASS INDEX: 36.31 KG/M2

## 2021-12-21 DIAGNOSIS — E78.00 HYPERCHOLESTEROLEMIA: Primary | ICD-10-CM

## 2021-12-21 DIAGNOSIS — R00.2 PALPITATIONS: ICD-10-CM

## 2021-12-21 DIAGNOSIS — R00.0 TACHYCARDIA: ICD-10-CM

## 2021-12-21 DIAGNOSIS — I10 ESSENTIAL (PRIMARY) HYPERTENSION: ICD-10-CM

## 2021-12-21 DIAGNOSIS — E66.01 MORBID OBESITY: ICD-10-CM

## 2021-12-21 DIAGNOSIS — I10 ESSENTIAL HYPERTENSION: ICD-10-CM

## 2021-12-21 PROCEDURE — 99999 PR PBB SHADOW E&M-EST. PATIENT-LVL IV: ICD-10-PCS | Mod: PBBFAC,,, | Performed by: INTERNAL MEDICINE

## 2021-12-21 PROCEDURE — 99214 OFFICE O/P EST MOD 30 MIN: CPT | Mod: S$GLB,,, | Performed by: INTERNAL MEDICINE

## 2021-12-21 PROCEDURE — 99999 PR PBB SHADOW E&M-EST. PATIENT-LVL IV: CPT | Mod: PBBFAC,,, | Performed by: INTERNAL MEDICINE

## 2021-12-21 PROCEDURE — 4010F ACE/ARB THERAPY RXD/TAKEN: CPT | Mod: CPTII,S$GLB,, | Performed by: INTERNAL MEDICINE

## 2021-12-21 PROCEDURE — 4010F PR ACE/ARB THEARPY RXD/TAKEN: ICD-10-PCS | Mod: CPTII,S$GLB,, | Performed by: INTERNAL MEDICINE

## 2021-12-21 PROCEDURE — 99214 PR OFFICE/OUTPT VISIT, EST, LEVL IV, 30-39 MIN: ICD-10-PCS | Mod: S$GLB,,, | Performed by: INTERNAL MEDICINE

## 2021-12-21 RX ORDER — EZETIMIBE 10 MG/1
10 TABLET ORAL DAILY
Qty: 90 TABLET | Refills: 3 | Status: SHIPPED | OUTPATIENT
Start: 2021-12-21 | End: 2022-12-30 | Stop reason: SDUPTHER

## 2022-01-04 ENCOUNTER — TELEPHONE (OUTPATIENT)
Dept: PHARMACY | Facility: CLINIC | Age: 52
End: 2022-01-04
Payer: MEDICARE

## 2022-01-04 NOTE — TELEPHONE ENCOUNTER
DOCUMENTATION ONLY  Emgality 100mg/mL syringes (episodic cluster headache)  Approval date: 1/1/2022 to 12/31/2022   Case ID# [id number]   
The patient is a 83y Female complaining of

## 2022-01-11 ENCOUNTER — OFFICE VISIT (OUTPATIENT)
Dept: PSYCHIATRY | Facility: CLINIC | Age: 52
End: 2022-01-11
Payer: COMMERCIAL

## 2022-01-11 DIAGNOSIS — F33.1 DEPRESSION, MAJOR, RECURRENT, MODERATE: ICD-10-CM

## 2022-01-11 DIAGNOSIS — F41.1 GENERALIZED ANXIETY DISORDER WITH PANIC ATTACKS: ICD-10-CM

## 2022-01-11 DIAGNOSIS — F41.0 GENERALIZED ANXIETY DISORDER WITH PANIC ATTACKS: ICD-10-CM

## 2022-01-11 PROCEDURE — 99214 OFFICE O/P EST MOD 30 MIN: CPT | Mod: S$GLB,,, | Performed by: NURSE PRACTITIONER

## 2022-01-11 PROCEDURE — 1159F PR MEDICATION LIST DOCUMENTED IN MEDICAL RECORD: ICD-10-PCS | Mod: CPTII,S$GLB,, | Performed by: NURSE PRACTITIONER

## 2022-01-11 PROCEDURE — 1160F RVW MEDS BY RX/DR IN RCRD: CPT | Mod: CPTII,S$GLB,, | Performed by: NURSE PRACTITIONER

## 2022-01-11 PROCEDURE — 99214 PR OFFICE/OUTPT VISIT, EST, LEVL IV, 30-39 MIN: ICD-10-PCS | Mod: S$GLB,,, | Performed by: NURSE PRACTITIONER

## 2022-01-11 PROCEDURE — 99999 PR PBB SHADOW E&M-EST. PATIENT-LVL III: ICD-10-PCS | Mod: PBBFAC,,, | Performed by: NURSE PRACTITIONER

## 2022-01-11 PROCEDURE — 1160F PR REVIEW ALL MEDS BY PRESCRIBER/CLIN PHARMACIST DOCUMENTED: ICD-10-PCS | Mod: CPTII,S$GLB,, | Performed by: NURSE PRACTITIONER

## 2022-01-11 PROCEDURE — 1159F MED LIST DOCD IN RCRD: CPT | Mod: CPTII,S$GLB,, | Performed by: NURSE PRACTITIONER

## 2022-01-11 PROCEDURE — 99999 PR PBB SHADOW E&M-EST. PATIENT-LVL III: CPT | Mod: PBBFAC,,, | Performed by: NURSE PRACTITIONER

## 2022-01-11 RX ORDER — DESVENLAFAXINE SUCCINATE 50 MG/1
50 TABLET, EXTENDED RELEASE ORAL DAILY
Qty: 90 TABLET | Refills: 0 | Status: SHIPPED | OUTPATIENT
Start: 2022-01-11 | End: 2022-04-11 | Stop reason: SDUPTHER

## 2022-01-11 NOTE — PATIENT INSTRUCTIONS
What is self-care?  Self-care? What does it mean? Any action you take that attends to your basic mental, emotional, and physical needs is considered self-care. Surprisingly, very few women think about, let alone take action on, self-care. A self-care practice asks you to take small steps each day to create habits that promote long-term wellbeing for yourself. In turn, these acts of self-care help your entire family.    What it is not.    Self-care is not about checking-out or indulgence.    Treating yourself to Netflix and cookies for a brief distraction from life could easily fall under self-care and is much different than nightly binging to avoid feelings or responsibilities.    If you are not sure, ask yourself this:    Does this action help my long-term wellbeing? Does it promote balance in my mind and body? What need is it attending to?    It is not about perfection, or doing everything you want.    We are talking about the basics here. Sleep, showering, chatting with a supportive friend for five minutes, enjoying a healthy meal quietly for 15 minutes, taking time to rest, meditate, or read a book.     It is not treatment for depression, anxiety    Self-care is certainly part of recovering from depression and anxiety, but often these conditions require further intervention, like medication, therapy, a support group, or a combination of those things. Self-care can provide an emotional buffer so that you can identify and work through the issues underlying your depression and anxiety. And it does not stop once you have recovered. This is a lifelong, ever-changing practice to reduce the chances of having a recurring episode and it allows you to bounce back more quickly and effectively from a relapse.    It is not selfish.    It is about getting basic physical, mental, and emotional needs met, which helps everyone in the family.    Common Barriers    Barriers can be circumstantial or they can be the way you think  about the situation. If something is keeping you from taking action, figure out what that is and what you can do to work around it.     Ask yourself, What else is getting in the way of my ability to attend to my mental, emotional, and physical needs? Other barriers include:        Motivation    Symptoms of depression and exhaustion can lead to plummeting motivation to do much of anything. If you are lacking motivation, take a look at your thinking about self-care. What thoughts come to mind about starting a self-care practice? If you notice thoughts like, Its not worth it, Its not going to help, Whats the point?, or Its too hard it may be time to be like [employer] and just do it.    Small actions can lead to increased motivation, which in turn will lead to more action. This can be the first and most difficult step to pull yourself out of a funk, but absolutely necessary to get the recovery ball rolling. So when you cant find motivation--fake it till you make it.    Time/Money    Self-care is not about spending money or taking loads of time away from your family. In fact, small doses of time for yourself to check in and attend to your needs in reasonable, do-able ways, is what will be effective and sustainable.

## 2022-01-11 NOTE — PROGRESS NOTES
"Outpatient Psychiatry Follow-Up Visit (MD/NP)      Disclaimer: Evaluation and treatment is based on information presented to date. Any new information may affect assessment and findings.          minutes of total time spent on the encounter, which includes face to face time and non-face to face time preparing to see the patient (eg, review of tests), Obtaining and/or reviewing separately obtained history, Documenting clinical information in the electronic or other health record, Independently interpreting results (not separately reported) and communicating results to the patient/family/caregiver, or Care coordination (not separately reported).     Crisis Disclaimer: Patient was informed that due to the virtual nature of the visit, that if a crisis develops, protocols will be implemented to ensure patient safety, including but not limited to: 1) Initiating a welfare check with local Law Enforcement, 2) Calling WrapMail[employer]/National Crisis Hotline, and/or 3) Initiating PEC/CEC procedures.         Clinical Status of Patient:  Outpatient (Ambulatory)    Chief Complaint:  Keith Duane Crawford is a 51 y.o. male who presents today for follow-up of depression and anxiety.  Met with patient.      Interval History and Content of Current Session:  Interim Events/Subjective Report/Content of Current Session: completed/reviewed questionnaires with patient. RAMIREZ, PHQ    Ramirez 7: 7  PHQ 9: 6        Continues to use medicinal cannabis; in pain management with butorphanol 10 mg valium 5 mg.  Chronic pain    Verbalizes pristiq is working, wife noted difference.  Less iritability; not on edge.  "if I get irritable, I can work my way through it"    Returned from vacation, " I had a very nice time, watching the sunsets"    Sleeping well  Appetite good    Some mild anxiety; manageable  Following with cardiology for check up  Labs to be obtained; reviewed labs from august    appt with SHANKAR Freitas LCSW tomorrow.    Denies suicidal or homicidal " "ideations; denies self-injury, cutting or burning                Depressive Disorder: REPORTS guilt, concentration problems.   Anxiety Disorder: hyperarousal symptoms, muscle tension.   Manic Disorder: DENIES none.   Psychotic Disorder: DENIES none.   Substance Use:  DENIES none.   Physical or Sexual Abuse: denies history of physical or sexual abuse       Review of Systems   · PSYCHIATRIC: Pertinant items are noted in the narrative.    Past Medical, Family and Social History: The patient's past medical, family and social history have been reviewed and updated as appropriate within the electronic medical record - see encounter notes.    Compliance: yes    Side effects: None    Risk Parameters:  Patient reports no suicidal ideation  Patient reports no homicidal ideation  Patient reports no self-injurious behavior  Patient reports no violent behavior    Exam (detailed: at least 9 elements; comprehensive: all 15 elements)   Constitutional  Vitals:  Most recent vital signs, dated less than 90 days prior to this appointment, were reviewed.   Last 3 sets of Vitals    Vitals - 1 value per visit 9/28/2021 12/12/2021 12/21/2021   SYSTOLIC 114 140 110   DIASTOLIC 80 80 60   PULSE 101 85 68   TEMPERATURE - 98.2 -   RESPIRATIONS 16 18 -   SPO2 98 100 96   Weight (lb) 251.32 252 253.09   Weight (kg) 114 114.306 114.8   HEIGHT 5' 10" - -   BODY MASS INDEX 36.06 36.16 36.31   VISIT REPORT - - -   Pain Score  - - -   Some recent data might be hidden          General:  unremarkable, age appropriate, casually dressed, neatly groomed     Musculoskeletal  Muscle Strength/Tone:  not examined   Gait & Station:  non-ataxic     Psychiatric  Speech:  no latency; no press   Mood & Affect:  steady  congruent and appropriate   Thought Process:  normal and logical, abstract, goal-directed   Associations:  intact   Thought Content:  normal, no suicidality, no homicidality, delusions, or paranoia   Insight:  intact, has awareness of illness "   Judgement: behavior is adequate to circumstances, age appropriate   Orientation:  grossly intact   Memory: intact for content of interview   Language: grossly intact   Attention Span & Concentration:  able to focus   Fund of Knowledge:  intact and appropriate to age and level of education, familiar with aspects of current personal life     Assessment and Diagnosis   Status/Progress: Based on the examination today, the patient's problem(s) is/are improved.  New problems have not been presented today.   Co-morbidities are complicating management of the primary condition.  There are no active rule-out diagnoses for this patient at this time.     General Impression:     Encounter Diagnoses   Name Primary?    Depression, major, recurrent, moderate     Generalized anxiety disorder with panic attacks          Intervention/Counseling/Treatment Plan   · Medication Management: The risks and benefits of medication were discussed with the patient.  · Keep therapy appt  ·     Reviewed pathophysiology of depression and anxiety. Discussed rationale behind treatment. Reviewed treatment options, including medication and psychotherapy. Reviewed pharmacology including onset of action, dosing, side effects, adverse reactions and duration of therapy (6-12 months).  Discussed the need to stay on medication if it is effective and not discontinue after a few weeks due to the probability of relapse.    Pt expressed appreciation for the visit today and did not have further question at this time though pt  was still informed to:     Call / Walk In if problems.    Call Report Side Effects to Psyc MD     Encouraged to follow up with primary care / Gen Med MD for continued monitoring of general health and wellness.    Call 911  Or go to ER if Acute Concerns (especially if any thoughts of harm to self or other).     Understanding was expressed; and no further concerns nor questions were raised at this time.        Return to Clinic: 3 months

## 2022-01-12 ENCOUNTER — OFFICE VISIT (OUTPATIENT)
Dept: PSYCHIATRY | Facility: CLINIC | Age: 52
End: 2022-01-12
Payer: COMMERCIAL

## 2022-01-12 DIAGNOSIS — F41.0 PANIC ATTACKS: ICD-10-CM

## 2022-01-12 DIAGNOSIS — F33.1 DEPRESSION, MAJOR, RECURRENT, MODERATE: Primary | ICD-10-CM

## 2022-01-12 DIAGNOSIS — F41.1 GENERALIZED ANXIETY DISORDER WITH PANIC ATTACKS: ICD-10-CM

## 2022-01-12 DIAGNOSIS — F41.0 GENERALIZED ANXIETY DISORDER WITH PANIC ATTACKS: ICD-10-CM

## 2022-01-12 PROCEDURE — 4010F ACE/ARB THERAPY RXD/TAKEN: CPT | Mod: CPTII,S$GLB,, | Performed by: SOCIAL WORKER

## 2022-01-12 PROCEDURE — 90834 PSYTX W PT 45 MINUTES: CPT | Mod: S$GLB,,, | Performed by: SOCIAL WORKER

## 2022-01-12 PROCEDURE — 4010F PR ACE/ARB THEARPY RXD/TAKEN: ICD-10-PCS | Mod: CPTII,S$GLB,, | Performed by: SOCIAL WORKER

## 2022-01-12 PROCEDURE — 90834 PR PSYCHOTHERAPY W/PATIENT, 45 MIN: ICD-10-PCS | Mod: S$GLB,,, | Performed by: SOCIAL WORKER
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
## 2022-01-13 ENCOUNTER — LAB VISIT (OUTPATIENT)
Dept: LAB | Facility: HOSPITAL | Age: 52
End: 2022-01-13
Attending: INTERNAL MEDICINE
Payer: MEDICARE

## 2022-01-13 DIAGNOSIS — I10 ESSENTIAL HYPERTENSION: ICD-10-CM

## 2022-01-13 DIAGNOSIS — R00.0 TACHYCARDIA: ICD-10-CM

## 2022-01-13 DIAGNOSIS — R00.2 PALPITATIONS: ICD-10-CM

## 2022-01-13 DIAGNOSIS — E66.01 MORBID OBESITY: ICD-10-CM

## 2022-01-13 DIAGNOSIS — E78.00 HYPERCHOLESTEROLEMIA: ICD-10-CM

## 2022-01-13 DIAGNOSIS — I10 ESSENTIAL (PRIMARY) HYPERTENSION: ICD-10-CM

## 2022-01-13 LAB
CHOLEST SERPL-MCNC: 176 MG/DL (ref 120–199)
CHOLEST/HDLC SERPL: 4.2 {RATIO} (ref 2–5)
HDLC SERPL-MCNC: 42 MG/DL (ref 40–75)
HDLC SERPL: 23.9 % (ref 20–50)
LDLC SERPL CALC-MCNC: 86.8 MG/DL (ref 63–159)
NONHDLC SERPL-MCNC: 134 MG/DL
TRIGL SERPL-MCNC: 236 MG/DL (ref 30–150)

## 2022-01-13 PROCEDURE — 80061 LIPID PANEL: CPT | Performed by: INTERNAL MEDICINE

## 2022-01-13 PROCEDURE — 36415 COLL VENOUS BLD VENIPUNCTURE: CPT | Mod: PO | Performed by: INTERNAL MEDICINE

## 2022-01-14 ENCOUNTER — TELEPHONE (OUTPATIENT)
Dept: CARDIOLOGY | Facility: CLINIC | Age: 52
End: 2022-01-14
Payer: MEDICARE

## 2022-01-14 NOTE — TELEPHONE ENCOUNTER
Spoke with pt to let him know that his Triglyceride level had no change but lab values did improve and to continue taking Zetia and start taking fish oil OTC twice a day. Pt verbalized understanding  ----- Message from Luis Corea MD sent at 1/14/2022  9:21 AM CST -----  No change in triglyceride level although values are improved will continue to follow on Zetia,  will consider fish oil supplements which she can get over-the-counter taking 2 twice a day but also can be prescribed at next visit.

## 2022-01-26 ENCOUNTER — OFFICE VISIT (OUTPATIENT)
Dept: PSYCHIATRY | Facility: CLINIC | Age: 52
End: 2022-01-26
Payer: COMMERCIAL

## 2022-01-26 DIAGNOSIS — F41.0 GENERALIZED ANXIETY DISORDER WITH PANIC ATTACKS: ICD-10-CM

## 2022-01-26 DIAGNOSIS — Z63.0 MARITAL PROBLEMS: ICD-10-CM

## 2022-01-26 DIAGNOSIS — F33.0 MAJOR DEPRESSIVE DISORDER, RECURRENT EPISODE, MILD: Primary | ICD-10-CM

## 2022-01-26 DIAGNOSIS — F41.1 GENERALIZED ANXIETY DISORDER WITH PANIC ATTACKS: ICD-10-CM

## 2022-01-26 DIAGNOSIS — Z86.59 HISTORY OF PANIC ATTACKS: ICD-10-CM

## 2022-01-26 PROCEDURE — 99999 PR PBB SHADOW E&M-EST. PATIENT-LVL I: ICD-10-PCS | Mod: PBBFAC,,, | Performed by: SOCIAL WORKER

## 2022-01-26 PROCEDURE — 90834 PSYTX W PT 45 MINUTES: CPT | Mod: S$GLB,,, | Performed by: SOCIAL WORKER

## 2022-01-26 PROCEDURE — 90834 PR PSYCHOTHERAPY W/PATIENT, 45 MIN: ICD-10-PCS | Mod: S$GLB,,, | Performed by: SOCIAL WORKER

## 2022-01-26 PROCEDURE — 4010F ACE/ARB THERAPY RXD/TAKEN: CPT | Mod: CPTII,S$GLB,, | Performed by: SOCIAL WORKER

## 2022-01-26 PROCEDURE — 99999 PR PBB SHADOW E&M-EST. PATIENT-LVL I: CPT | Mod: PBBFAC,,, | Performed by: SOCIAL WORKER

## 2022-01-26 PROCEDURE — 4010F PR ACE/ARB THEARPY RXD/TAKEN: ICD-10-PCS | Mod: CPTII,S$GLB,, | Performed by: SOCIAL WORKER

## 2022-01-26 SDOH — SOCIAL DETERMINANTS OF HEALTH (SDOH): PROBLEMS IN RELATIONSHIP WITH SPOUSE OR PARTNER: Z63.0
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
## 2022-02-09 ENCOUNTER — OFFICE VISIT (OUTPATIENT)
Dept: PSYCHIATRY | Facility: CLINIC | Age: 52
End: 2022-02-09
Payer: COMMERCIAL

## 2022-02-09 DIAGNOSIS — F41.1 GENERALIZED ANXIETY DISORDER WITH PANIC ATTACKS: ICD-10-CM

## 2022-02-09 DIAGNOSIS — Z63.0 MARITAL PROBLEMS: ICD-10-CM

## 2022-02-09 DIAGNOSIS — F41.0 GENERALIZED ANXIETY DISORDER WITH PANIC ATTACKS: ICD-10-CM

## 2022-02-09 DIAGNOSIS — Z86.59 HISTORY OF PANIC ATTACKS: ICD-10-CM

## 2022-02-09 DIAGNOSIS — F33.0 MAJOR DEPRESSIVE DISORDER, RECURRENT EPISODE, MILD: Primary | ICD-10-CM

## 2022-02-09 PROCEDURE — 4010F PR ACE/ARB THEARPY RXD/TAKEN: ICD-10-PCS | Mod: CPTII,S$GLB,, | Performed by: SOCIAL WORKER

## 2022-02-09 PROCEDURE — 90834 PSYTX W PT 45 MINUTES: CPT | Mod: S$GLB,,, | Performed by: SOCIAL WORKER

## 2022-02-09 PROCEDURE — 90834 PR PSYCHOTHERAPY W/PATIENT, 45 MIN: ICD-10-PCS | Mod: S$GLB,,, | Performed by: SOCIAL WORKER

## 2022-02-09 PROCEDURE — 4010F ACE/ARB THERAPY RXD/TAKEN: CPT | Mod: CPTII,S$GLB,, | Performed by: SOCIAL WORKER

## 2022-02-09 SDOH — SOCIAL DETERMINANTS OF HEALTH (SDOH): PROBLEMS IN RELATIONSHIP WITH SPOUSE OR PARTNER: Z63.0

## 2022-02-24 ENCOUNTER — PATIENT MESSAGE (OUTPATIENT)
Dept: PSYCHIATRY | Facility: CLINIC | Age: 52
End: 2022-02-24
Payer: MEDICARE

## 2022-03-07 NOTE — PROGRESS NOTES
"Individual Psychotherapy (PhD/LCSW)    2/9/2022    Site:   [facility]     Therapeutic Intervention: Met with patient.  Outpatient - Insight oriented psychotherapy 45 min - CPT code 87843 and Outpatient - Supportive psychotherapy 45 min - CPT Code 70182    Chief complaint/reason for encounter: depression, anxiety and interpersonal     Interval history and content of current session:  Late entry for 2/92/22.  51 year old male patient returned to clinic for follow-up psychotherapy to address depression and anxiety issues, in context of chronic pain and some family stressors.  Previuosly seen 1/26/22.  Patient with chronic severe pain, old resentments of his father, and a 15 year old son with ADHD, some of his more significant stressors.  Reporting he has not been sleeping well lately, though this is a long-term problem.  Reporting his son is recovering from surgery last week to repair a hole in his left eardrum.  Patient reporting mood generally okay.  Talked about his estranged wife and both being emotionally "done" with the marriage.  They continue to maintain a household together, for financial/logistical reasons.  Discussed how she changed since they .  Both are politically liberal, but since the 2016 election, he reports her having become militant about it, "ultr-progressive," "cancel culture, holier than thou" in her attitude and speech, saying it became impossible to talk to her, as no opinion other than her own was allowed, as far as she was concerned.  Talked about question of where he now gets his emotional social affirmation.  Stated he steers clear of her as much as possible.  Patient denied any si/hi, mood swings, cognitive deficit, or substance abuse.   Supportive therapy provided. Follow up scheduled 3/8/22.       Treatment plan:  · Target symptoms: depression, anxiety , interpersonal/family  · Why chosen therapy is appropriate versus another modality: relevant to diagnosis; patient responsive to " this modality.  · Outcome monitoring methods:  self-report; observation.  · Therapeutic intervention type: insight-oriented psychotherapy; supportive psychotherapy.    Risk parameters:  Patient reports no suicidal ideation  Patient reports no homicidal ideation  Patient reports no self-injurious behavior  Patient reports no violent behavior    Verbal deficits: None    Patient's response to intervention:  The patient's response to intervention is accepting    Progress toward goals and other mental status changes:  The patient's progress toward goals is fair .    Diagnosis:     ICD-10-CM ICD-9-CM   1. Major depressive disorder, recurrent episode, mild  F33.0 296.31   2. Generalized anxiety disorder with panic attacks  F41.1 300.02    F41.0 300.01   3. History of panic attacks  Z86.59 V11.8   4. Marital problems  Z63.0 V61.10       Plan:  individual psychotherapy    Return to clinic: as scheduled    Length of Service (minutes): 45

## 2022-03-10 DIAGNOSIS — I10 ESSENTIAL (PRIMARY) HYPERTENSION: ICD-10-CM

## 2022-03-10 DIAGNOSIS — E78.00 HYPERCHOLESTEROLEMIA: ICD-10-CM

## 2022-03-10 DIAGNOSIS — I10 ESSENTIAL HYPERTENSION: ICD-10-CM

## 2022-03-10 RX ORDER — AMLODIPINE BESYLATE 10 MG/1
10 TABLET ORAL DAILY
Qty: 30 TABLET | Refills: 5 | Status: SHIPPED | OUTPATIENT
Start: 2022-03-10 | End: 2022-09-11 | Stop reason: SDUPTHER

## 2022-03-10 RX ORDER — AMLODIPINE BESYLATE 10 MG/1
10 TABLET ORAL DAILY
Qty: 30 TABLET | Refills: 5 | Status: CANCELLED | OUTPATIENT
Start: 2022-03-10

## 2022-03-28 ENCOUNTER — OFFICE VISIT (OUTPATIENT)
Dept: PSYCHIATRY | Facility: CLINIC | Age: 52
End: 2022-03-28
Payer: MEDICARE

## 2022-03-28 DIAGNOSIS — Z86.59 HISTORY OF PANIC ATTACKS: ICD-10-CM

## 2022-03-28 DIAGNOSIS — M79.606 CHRONIC PAIN OF LOWER EXTREMITY, UNSPECIFIED LATERALITY: ICD-10-CM

## 2022-03-28 DIAGNOSIS — G89.29 CHRONIC PAIN OF LOWER EXTREMITY, UNSPECIFIED LATERALITY: ICD-10-CM

## 2022-03-28 DIAGNOSIS — F33.0 MAJOR DEPRESSIVE DISORDER, RECURRENT EPISODE, MILD: Primary | ICD-10-CM

## 2022-03-28 DIAGNOSIS — Z63.0 MARITAL PROBLEMS: ICD-10-CM

## 2022-03-28 DIAGNOSIS — F41.0 GENERALIZED ANXIETY DISORDER WITH PANIC ATTACKS: ICD-10-CM

## 2022-03-28 DIAGNOSIS — F41.1 GENERALIZED ANXIETY DISORDER WITH PANIC ATTACKS: ICD-10-CM

## 2022-03-28 PROCEDURE — 90834 PR PSYCHOTHERAPY W/PATIENT, 45 MIN: ICD-10-PCS | Mod: S$GLB,,, | Performed by: SOCIAL WORKER

## 2022-03-28 PROCEDURE — 4010F PR ACE/ARB THEARPY RXD/TAKEN: ICD-10-PCS | Mod: CPTII,S$GLB,, | Performed by: SOCIAL WORKER

## 2022-03-28 PROCEDURE — 4010F ACE/ARB THERAPY RXD/TAKEN: CPT | Mod: CPTII,S$GLB,, | Performed by: SOCIAL WORKER

## 2022-03-28 PROCEDURE — 90834 PSYTX W PT 45 MINUTES: CPT | Mod: S$GLB,,, | Performed by: SOCIAL WORKER

## 2022-03-28 SDOH — SOCIAL DETERMINANTS OF HEALTH (SDOH): PROBLEMS IN RELATIONSHIP WITH SPOUSE OR PARTNER: Z63.0

## 2022-03-28 NOTE — PROGRESS NOTES
Individual Psychotherapy (PhD/LCSW)    3/28/2022    Site:   [facility]     Therapeutic Intervention: Met with patient.  Outpatient - Insight oriented psychotherapy 45 min - CPT code 06222 and Outpatient - Supportive psychotherapy 45 min - CPT Code 98727    Chief complaint/reason for encounter: depression, anxiety and interpersonal     Interval history and content of current session:  51 year old male patient returned for follow up psychotherapy; target of recurrent depression and anxiety with exacerbating stressors of chronic orthopedic pain and of marital estrangement.  Patient ambulating with a limp this morning, reporting his hip has started hurting him, to add to his existing back and foot pain.  Reported he is preoccupied by issues at home regarding repairs to the house mindy throughout.  Said the house has be be emptied, with belongings all going into a rental pod for the time being.  He is stressed both by logistical problems with the arrangement and a lack of help by his son and estranged wife, who both also reside in the home.  Patient feeling like it is all on his shoulders, complete with his physical pain issues.  Patient talked about his frustrations with his wife's clutter habits, not hoarding but just never wanting to let go of things she has not used in years.  Patient denied any si/hi, mood swings, cognitive deficit, or substance abuse.   Patient planning to arrange follow up himself after checking on his schedule, tba recommended within 3 to 4 weeks.       Treatment plan:  · Target symptoms: depression, anxiety , interpersonal/family  · Why chosen therapy is appropriate versus another modality: relevant to diagnosis; patient responsive to this modality.  · Outcome monitoring methods:  self-report; observation.  · Therapeutic intervention type: insight-oriented psychotherapy; supportive psychotherapy.    Risk parameters:  Patient reports no suicidal ideation  Patient reports no homicidal  ideation  Patient reports no self-injurious behavior  Patient reports no violent behavior    Verbal deficits: None    Patient's response to intervention:  The patient's response to intervention is accepting    Progress toward goals and other mental status changes:  The patient's progress toward goals is fair .    Diagnosis:     ICD-10-CM ICD-9-CM   1. Major depressive disorder, recurrent episode, mild  F33.0 296.31   2. Generalized anxiety disorder with panic attacks  F41.1 300.02    F41.0 300.01   3. Marital problems  Z63.0 V61.10   4. History of panic attacks  Z86.59 V11.8   5. Chronic pain of lower extremity, unspecified laterality  M79.606 729.5    G89.29 338.29       Plan:  individual psychotherapy    Return to clinic:3 to 4 weeks as able, tba    Length of Service (minutes): 45

## 2022-04-11 DIAGNOSIS — F33.1 DEPRESSION, MAJOR, RECURRENT, MODERATE: ICD-10-CM

## 2022-04-11 DIAGNOSIS — F41.0 GENERALIZED ANXIETY DISORDER WITH PANIC ATTACKS: ICD-10-CM

## 2022-04-11 DIAGNOSIS — F41.1 GENERALIZED ANXIETY DISORDER WITH PANIC ATTACKS: ICD-10-CM

## 2022-04-11 RX ORDER — DESVENLAFAXINE SUCCINATE 50 MG/1
50 TABLET, EXTENDED RELEASE ORAL DAILY
Qty: 90 TABLET | Refills: 0 | Status: SHIPPED | OUTPATIENT
Start: 2022-04-11 | End: 2022-07-22 | Stop reason: SDUPTHER

## 2022-04-21 RX ORDER — GALCANEZUMAB 100 MG/ML
300 INJECTION, SOLUTION SUBCUTANEOUS
Qty: 3 ML | Refills: 11 | Status: SHIPPED | OUTPATIENT
Start: 2022-04-21 | End: 2023-05-19 | Stop reason: SDUPTHER

## 2022-05-09 ENCOUNTER — OFFICE VISIT (OUTPATIENT)
Dept: PSYCHIATRY | Facility: CLINIC | Age: 52
End: 2022-05-09
Payer: COMMERCIAL

## 2022-05-09 DIAGNOSIS — F41.0 GENERALIZED ANXIETY DISORDER WITH PANIC ATTACKS: ICD-10-CM

## 2022-05-09 DIAGNOSIS — Z86.59 HISTORY OF PANIC ATTACKS: ICD-10-CM

## 2022-05-09 DIAGNOSIS — F41.1 GENERALIZED ANXIETY DISORDER WITH PANIC ATTACKS: ICD-10-CM

## 2022-05-09 DIAGNOSIS — Z63.0 MARITAL PROBLEMS: ICD-10-CM

## 2022-05-09 DIAGNOSIS — F33.0 MAJOR DEPRESSIVE DISORDER, RECURRENT EPISODE, MILD: Primary | ICD-10-CM

## 2022-05-09 PROCEDURE — 4010F ACE/ARB THERAPY RXD/TAKEN: CPT | Mod: CPTII,S$GLB,, | Performed by: SOCIAL WORKER

## 2022-05-09 PROCEDURE — 90834 PR PSYCHOTHERAPY W/PATIENT, 45 MIN: ICD-10-PCS | Mod: S$GLB,,, | Performed by: SOCIAL WORKER

## 2022-05-09 PROCEDURE — 4010F PR ACE/ARB THEARPY RXD/TAKEN: ICD-10-PCS | Mod: CPTII,S$GLB,, | Performed by: SOCIAL WORKER

## 2022-05-09 PROCEDURE — 90834 PSYTX W PT 45 MINUTES: CPT | Mod: S$GLB,,, | Performed by: SOCIAL WORKER

## 2022-05-09 SDOH — SOCIAL DETERMINANTS OF HEALTH (SDOH): PROBLEMS IN RELATIONSHIP WITH SPOUSE OR PARTNER: Z63.0
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
## 2022-07-22 DIAGNOSIS — F41.0 GENERALIZED ANXIETY DISORDER WITH PANIC ATTACKS: ICD-10-CM

## 2022-07-22 DIAGNOSIS — F33.1 DEPRESSION, MAJOR, RECURRENT, MODERATE: ICD-10-CM

## 2022-07-22 DIAGNOSIS — F41.1 GENERALIZED ANXIETY DISORDER WITH PANIC ATTACKS: ICD-10-CM

## 2022-07-22 RX ORDER — DESVENLAFAXINE SUCCINATE 50 MG/1
50 TABLET, EXTENDED RELEASE ORAL DAILY
Qty: 90 TABLET | Refills: 0 | Status: CANCELLED | OUTPATIENT
Start: 2022-07-22 | End: 2022-10-20

## 2022-07-26 DIAGNOSIS — F33.1 DEPRESSION, MAJOR, RECURRENT, MODERATE: ICD-10-CM

## 2022-07-26 DIAGNOSIS — F41.1 GENERALIZED ANXIETY DISORDER WITH PANIC ATTACKS: ICD-10-CM

## 2022-07-26 DIAGNOSIS — F41.0 GENERALIZED ANXIETY DISORDER WITH PANIC ATTACKS: ICD-10-CM

## 2022-07-26 RX ORDER — DESVENLAFAXINE SUCCINATE 50 MG/1
50 TABLET, EXTENDED RELEASE ORAL DAILY
Qty: 90 TABLET | Refills: 0 | Status: CANCELLED | OUTPATIENT
Start: 2022-07-26 | End: 2022-10-24

## 2022-07-27 RX ORDER — DESVENLAFAXINE SUCCINATE 50 MG/1
50 TABLET, EXTENDED RELEASE ORAL DAILY
Qty: 90 TABLET | Refills: 0 | Status: SHIPPED | OUTPATIENT
Start: 2022-07-27 | End: 2022-10-16 | Stop reason: SDUPTHER

## 2022-07-27 NOTE — TELEPHONE ENCOUNTER
Had to send this to you. It would not let me deny it or send the patient a message    -----------------------------------------------    I'm covering for CLEOPATRA Schulz who is no longer working at [facility]. Received refill request, reviewed chart. I called patient and LVM.  I will approve refill request, and ask staff to arrange for appropriate follow-up.    Enrique Newton MD  [facility]

## 2022-08-23 ENCOUNTER — PATIENT MESSAGE (OUTPATIENT)
Dept: PSYCHIATRY | Facility: CLINIC | Age: 52
End: 2022-08-23
Payer: MEDICARE

## 2022-09-11 DIAGNOSIS — I10 ESSENTIAL HYPERTENSION: ICD-10-CM

## 2022-09-11 DIAGNOSIS — E78.00 HYPERCHOLESTEROLEMIA: ICD-10-CM

## 2022-09-11 DIAGNOSIS — I10 ESSENTIAL (PRIMARY) HYPERTENSION: ICD-10-CM

## 2022-09-11 RX ORDER — AMLODIPINE BESYLATE 10 MG/1
10 TABLET ORAL DAILY
Qty: 30 TABLET | Refills: 5 | Status: SHIPPED | OUTPATIENT
Start: 2022-09-11 | End: 2023-02-05 | Stop reason: SDUPTHER

## 2022-09-20 ENCOUNTER — OFFICE VISIT (OUTPATIENT)
Dept: PSYCHIATRY | Facility: CLINIC | Age: 52
End: 2022-09-20
Payer: MEDICARE

## 2022-09-20 DIAGNOSIS — F33.0 MAJOR DEPRESSIVE DISORDER, RECURRENT EPISODE, MILD: Primary | ICD-10-CM

## 2022-09-20 DIAGNOSIS — F41.0 GENERALIZED ANXIETY DISORDER WITH PANIC ATTACKS: ICD-10-CM

## 2022-09-20 DIAGNOSIS — Z63.0 MARITAL PROBLEMS: ICD-10-CM

## 2022-09-20 DIAGNOSIS — F41.1 GENERALIZED ANXIETY DISORDER WITH PANIC ATTACKS: ICD-10-CM

## 2022-09-20 PROCEDURE — 90834 PSYTX W PT 45 MINUTES: CPT | Mod: S$GLB,,, | Performed by: SOCIAL WORKER

## 2022-09-20 PROCEDURE — 90834 PR PSYCHOTHERAPY W/PATIENT, 45 MIN: ICD-10-PCS | Mod: S$GLB,,, | Performed by: SOCIAL WORKER

## 2022-09-20 PROCEDURE — 4010F PR ACE/ARB THEARPY RXD/TAKEN: ICD-10-PCS | Mod: CPTII,S$GLB,, | Performed by: SOCIAL WORKER

## 2022-09-20 PROCEDURE — 4010F ACE/ARB THERAPY RXD/TAKEN: CPT | Mod: CPTII,S$GLB,, | Performed by: SOCIAL WORKER

## 2022-09-20 SDOH — SOCIAL DETERMINANTS OF HEALTH (SDOH): PROBLEMS IN RELATIONSHIP WITH SPOUSE OR PARTNER: Z63.0
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
## 2022-10-17 ENCOUNTER — PATIENT MESSAGE (OUTPATIENT)
Dept: PSYCHIATRY | Facility: CLINIC | Age: 52
End: 2022-10-17
Payer: MEDICARE

## 2022-11-09 ENCOUNTER — OFFICE VISIT (OUTPATIENT)
Dept: NEUROLOGY | Facility: CLINIC | Age: 52
End: 2022-11-09
Payer: MEDICARE

## 2022-11-09 VITALS
WEIGHT: 253.88 LBS | DIASTOLIC BLOOD PRESSURE: 75 MMHG | RESPIRATION RATE: 17 BRPM | HEIGHT: 70 IN | HEART RATE: 64 BPM | BODY MASS INDEX: 36.35 KG/M2 | SYSTOLIC BLOOD PRESSURE: 112 MMHG

## 2022-11-09 DIAGNOSIS — G89.4 CHRONIC PAIN SYNDROME: ICD-10-CM

## 2022-11-09 DIAGNOSIS — G44.40 MEDICATION OVERUSE HEADACHE: ICD-10-CM

## 2022-11-09 DIAGNOSIS — G44.011 INTRACTABLE EPISODIC CLUSTER HEADACHE: Primary | ICD-10-CM

## 2022-11-09 PROCEDURE — 3074F PR MOST RECENT SYSTOLIC BLOOD PRESSURE < 130 MM HG: ICD-10-PCS | Mod: CPTII,S$GLB,, | Performed by: NURSE PRACTITIONER

## 2022-11-09 PROCEDURE — 1160F PR REVIEW ALL MEDS BY PRESCRIBER/CLIN PHARMACIST DOCUMENTED: ICD-10-PCS | Mod: CPTII,S$GLB,, | Performed by: NURSE PRACTITIONER

## 2022-11-09 PROCEDURE — 4010F PR ACE/ARB THEARPY RXD/TAKEN: ICD-10-PCS | Mod: CPTII,S$GLB,, | Performed by: NURSE PRACTITIONER

## 2022-11-09 PROCEDURE — 3074F SYST BP LT 130 MM HG: CPT | Mod: CPTII,S$GLB,, | Performed by: NURSE PRACTITIONER

## 2022-11-09 PROCEDURE — 3078F DIAST BP <80 MM HG: CPT | Mod: CPTII,S$GLB,, | Performed by: NURSE PRACTITIONER

## 2022-11-09 PROCEDURE — 3008F BODY MASS INDEX DOCD: CPT | Mod: CPTII,S$GLB,, | Performed by: NURSE PRACTITIONER

## 2022-11-09 PROCEDURE — 3078F PR MOST RECENT DIASTOLIC BLOOD PRESSURE < 80 MM HG: ICD-10-PCS | Mod: CPTII,S$GLB,, | Performed by: NURSE PRACTITIONER

## 2022-11-09 PROCEDURE — 99214 PR OFFICE/OUTPT VISIT, EST, LEVL IV, 30-39 MIN: ICD-10-PCS | Mod: S$GLB,,, | Performed by: NURSE PRACTITIONER

## 2022-11-09 PROCEDURE — 3008F PR BODY MASS INDEX (BMI) DOCUMENTED: ICD-10-PCS | Mod: CPTII,S$GLB,, | Performed by: NURSE PRACTITIONER

## 2022-11-09 PROCEDURE — 99999 PR PBB SHADOW E&M-EST. PATIENT-LVL III: CPT | Mod: PBBFAC,,, | Performed by: NURSE PRACTITIONER

## 2022-11-09 PROCEDURE — 1159F MED LIST DOCD IN RCRD: CPT | Mod: CPTII,S$GLB,, | Performed by: NURSE PRACTITIONER

## 2022-11-09 PROCEDURE — 99999 PR PBB SHADOW E&M-EST. PATIENT-LVL III: ICD-10-PCS | Mod: PBBFAC,,, | Performed by: NURSE PRACTITIONER

## 2022-11-09 PROCEDURE — 1159F PR MEDICATION LIST DOCUMENTED IN MEDICAL RECORD: ICD-10-PCS | Mod: CPTII,S$GLB,, | Performed by: NURSE PRACTITIONER

## 2022-11-09 PROCEDURE — 1160F RVW MEDS BY RX/DR IN RCRD: CPT | Mod: CPTII,S$GLB,, | Performed by: NURSE PRACTITIONER

## 2022-11-09 PROCEDURE — 99214 OFFICE O/P EST MOD 30 MIN: CPT | Mod: S$GLB,,, | Performed by: NURSE PRACTITIONER

## 2022-11-09 PROCEDURE — 4010F ACE/ARB THERAPY RXD/TAKEN: CPT | Mod: CPTII,S$GLB,, | Performed by: NURSE PRACTITIONER

## 2022-11-09 NOTE — PATIENT INSTRUCTIONS
Please call our clinic at [phone] or send a message on the [facility] portal if there are any changes to the plan described below, for example,if you are not contacted for the requested tests, referral(s) within one week, if you are unable to receive the medications prescribed, or if you feel you need to change the treatment course for any reason.     TESTING:  -- none     REFERRALS:  -- none     PREVENTION (use daily regardless of headache):  -- continue duloxetine 30mg daily at this time  -- CONTINUE Emgality 300mg (3 x 100mg injections) monthly ([facility])  -- Please try to wear CPAP a minimum of 4 hours nightly. Follow up with sleep specialist      AS-NEEDED TREATMENT (use total no more than 10 days per month unless otherwise stated):  -- recommend limited Stadol use  -- Imitrex use is OK   -- continue compazine. This is a nausea medication that also has anti-migraine properties. Can take daily and will not contribute to rebound headaches

## 2022-11-09 NOTE — PROGRESS NOTES
Date of service: 11/9/2022  Referring provider: No ref. provider found    Subjective:      Chief complaint: Headache       Patient ID: Keith Duane Crawford is a 52 y.o. gentleman with intractable cluster headaches presenting for follow up of headache    Previously saw Dr. Allen     History of Present Illness    INTERVAL HISTORY 11/9/22    Last visit with me was two years ago. He has been followed by Dr. Guerrero and last visit with her was April 2021. At that time, he was started on Emgality 300 mg. Brain MRI done then was normal.    Today he reports he is a little better. He is smoking marijuana. Headaches are left sided. Current pain 3 with range 2-9. Headaches are daily. Otherwise information below is reviewed and verified with no changes made     INTERVAL HISTORY 11/18/2020  At last visit, 2 months ago, we recommended he contact the [facility] headache and pain department for possible evaluation. He was to start gliacin and continue emgality. Goal to have him start monthly PRN lidocaine infusions. We also added compazine and lidocaine NS.    Today he reports he is a little worse. He still has cluster headaches 3-4 days per week. He reports increased pain due to needing a tooth pulled (will happen next week), recent kidney stone, and life stressors. Headaches occur left side and behind the eye. Current pain 2 with range 1-8. He remains with daily headache. He takes either sumatriptan, oxycodone, butalbital, or tylenol daily. He does report he has not had to go to ER which is an improvement.     He has received the Gliacon but has not started. He is not wearing CPAP at all due to comfort.     INTERVAL HISTORY 9/22/2020  At last visit, MRI brain ordered (normal) and he was to start Gliacin and Emgality for cluster migraines. Bridge of IV infusions also ordered. He was admitted to [facility] 9/7 for lidocaine protocol. He was also in MOD to stadol nasal spray and hydrocodone.     Today he reports he is about the same.  Headaches are left sided. current pain 3 with range 1-8. He still has headaches 7 days per week. He takes tylenol, aleve, advil, sumatriptan, stadol, oxycodone 5 days per week.  Oxycodone three days per week. Stadol is on backorder until October, last used before hospital infusions. Sumatriptan IM at least once per week. OTC daily. He has not started the Gliacin, he states he forgot. He has used lidocaine NS which helps some. He has been on Emgality 300 mg for at least a year.    He is followed by Dr. Singh for back pain. He is scheduled for epidural steroid injections tomorrow.     He does not wear CPAP mask.     ORIGINAL HEADACHE HISTORY - 10/24/2019  Age at onset and course over time:  The headaches began in 2001 with 2 clusters, which went away until 2009 but had new onset migraines in between, improved in frequency on topiramate for a while. Developed knee problems and was on various pain medications. In Nov 2009 had his 5th knee scope. Was recommended to wean off pain medications. Has had chronic cluster since Nov 2009.  Reverted at some point to episodic frequency on Botox however this lost efficacy after approximately a year or so.    He was in the [facility] galcanezumab study - helped with frequency and intensity of his cluster headaches    Location:  Left fronto/temporal / parietal  Quality:  [x] pressure [] tight [] throbbing [x] sharp [x] stabbing   Severity:  Current 3-4, at its best to, at its worst 9  Duration:  Sec, minutes, hours, days, constant  Frequency:  Daily, never pain-free, constant left hemicrania pain with escalations 2 dozen times per day with left ipsilateral autonomic features, rocks during escalations   Headaches awaken at night?:   3-4 nights per week  Worst time of day:  Mid day in the evening  Associated with: [x] photophobia [x]  phonophobia [] osmophobia [] blurred vision  [] double vision [x] loss of appetite [x] nausea [x] vomiting [x] dizziness [] vertigo  [] tinnitus []  irritability [x] sinus pressure [] problems with concentration   [] neck tightness   Alleviated by:  [] sleep [] darkness [] massage [] heat [] ice [x] medication  Exacerbated by:  [x] fatigue [x] light [] noise [] smells [] coughing [] sneezing  [] bending over [] ovulation [] menses [] alcohol [x] change in weather [x]  stress  Ipsilateral autonomic: [x] nasal congestion [x] lacrimation [] ptosis [] injection [] edema [] foreign body sensation [] ear fullness   ICP:  No transient visual obscurations, no tinnitus, no positional changes to headaches  Sleep habits:  He has trouble falling asleep, staying asleep, and non refreshing sleep he does have sleep apnea  Caffeine intake:  320 mg per day    Migraines - same location, more throbbing, sometimes bilateral     MIDAS 75 indicating severe disability  Hit 6 72    Current acute treatment:  Stadol nasal spray reduced to using every few days  Medical marijuana   Reglan  Tizanidine  Voltaren - hip and lower back     Current prevention:  Duloxetine 30 mg daily  Lisinopril  Emgality 300 mg monthly   Desvenlafaxine  (Amlodipine)  Trazodone  Metoprolol    Previously tried/failed acute treatment:  Oxygen 15 L  Tylenol  Aspirin  Diclofenac  Ibuprofen  Indomethacin - unsure dose   Ketorolac  Aleve  Meloxicam  DHE infusions inpatient 3 days, some response, tolerates it OK   Axert  Relpax  Frova  Emerged  Maxalt  Treximet  Imitrex   Zomig  Fioricet  Excedrin  Infusions of - benadryl, phenergan, reglan, dilaudid, decadron - helpful for days   Oxycodone 10mg 2-3 per day 5-6 days per week - reduced to 2-3 days per week  Sumatriptan  Compazine - effective   Lidocaine NS - some relief  Ubrelvy    Previously tried/failed preventative treatment:  Lamotrigine  Trileptal  Lyrica  Topiramate  Keppra  Depakote  Valium  Xanax  Klonopin  Amitriptyline  Nortriptyline  Venlafaxine  Trazodone  Celexa  Lexapro  Gabapentin  Metoprolol  Bystolic  Propranolol  Verapamil  Gamma  core  Cephaly  Occipital neurostimulation  Sphenopalatine ganglion block  Occipital nerve block  Ketamine infusion  Seroquel + depakote - caused first overdose  Lithium - developed toxicity   Botox - worked for about 1.5 years, chronic to episodic     No methergine, no gliacin     Review of patient's allergies indicates:  No Known Allergies  Current Outpatient Medications   Medication Sig Dispense Refill    allopurinoL (ZYLOPRIM) 300 MG tablet Take 1 tablet by mouth every day 30 tablet 5    allopurinoL (ZYLOPRIM) 300 MG tablet Take 1 tablet by mouth every day 30 tablet 5    amLODIPine (NORVASC) 10 MG tablet TAKE 1 TABLET BY MOUTH EVERY DAY 30 tablet 5    atorvastatin (LIPITOR) 10 MG tablet Take 1 tablet every day by oral route. 30 tablet 5    atorvastatin (LIPITOR) 20 MG tablet Take 1 tablet by mouth every day 30 tablet 5    butorphanol (STADOL) 10 mg/mL nasal spray 1 spray by Nasal route every 4 (four) hours as needed for Pain (migraine).      cetirizine (ZYRTEC) 10 MG tablet Take 10 mg by mouth once daily.      desvenlafaxine succinate (PRISTIQ) 50 MG Tb24 Take 1 tablet (50 mg total) by mouth once daily. 90 tablet 0    diazePAM (VALIUM) 5 MG tablet Take 5 mg by mouth 2 (two) times daily as needed for Anxiety.       diazePAM (VALIUM) 5 MG tablet Take 1 tablet (5 mg total) by mouth 2 (two) times daily. 60 tablet 3    diclofenac (VOLTAREN) 75 MG EC tablet Take 1 tablet by mouth 2 (two) times daily as needed for up to 14 days. 28 tablet 0    DULoxetine (CYMBALTA) 60 MG capsule Take 1 capsule every day by oral route. 30 capsule 5    DULoxetine (CYMBALTA) 60 MG capsule Take 1 capsule by mouth every day 30 capsule 5    ezetimibe (ZETIA) 10 mg tablet Take 1 tablet (10 mg total) by mouth once daily. 90 tablet 3    fenofibrate 160 MG Tab Take 160 mg by mouth once daily.      fenofibrate 160 MG Tab TAKE 1 TABLET BY MOUTH EVERY DAY 30 tablet 5    galcanezumab-gnlm 300 mg/3 mL (100 mg/mL x 3) Syrg Inject 3 mLs (300 mg total)  into the skin every 28 days. 3 mL 11    hydroCHLOROthiazide (HYDRODIURIL) 25 MG tablet Take 1 tablet by mouth every day 30 tablet 5    LIDOcaine HCl 2% (LIDOCAINE VISCOUS) 2 % Soln by Mucous Membrane route 2 (two) times daily as needed (cluster headache). 100 mL 3    lisinopriL (PRINIVIL,ZESTRIL) 40 MG tablet SMARTSI Tablet(s) By Mouth Daily      lisinopriL (PRINIVIL,ZESTRIL) 40 MG tablet Take 1 tablet by mouth every day 30 tablet 5    lisinopriL (PRINIVIL,ZESTRIL) 40 MG tablet Take 1 tablet (40 mg total) by mouth once daily. 30 tablet 5    metoprolol tartrate (LOPRESSOR) 25 MG tablet Take 1 tablet by mouth twice a day 60 tablet 5    prochlorperazine (COMPAZINE) 10 MG tablet Take 1 tablet (10 mg total) by mouth every 6 (six) hours as needed (migraine or nausea). 60 tablet 11    tiZANidine (ZANAFLEX) 4 MG tablet Take 1 tablet (4 mg total) by mouth 3 (three) times daily as needed for pain/ muscle spasms. / may cause sedation/ DO NOT DRIVE ON MED 90 tablet 5    tiZANidine (ZANAFLEX) 4 MG tablet Take 1 tablet by mouth 3 times a day 90 tablet 3    tiZANidine (ZANAFLEX) 4 MG tablet Take 1 tablet by mouth 3 times a day 90 tablet 5    traZODone (DESYREL) 100 MG tablet Take 1 tablet by mouth once daily at bedtime 30 tablet 4     No current facility-administered medications for this visit.       Past Medical History  Past Medical History:   Diagnosis Date    Arthritis     Back pain     Cluster headaches     Depression     HTN (hypertension)     Hyperlipidemia     PRISCILLA on CPAP        Past Surgical History  Past Surgical History:   Procedure Laterality Date    KNEE SURGERY         Family History  History reviewed. No pertinent family history.    Social History  Social History     Socioeconomic History    Marital status:    Tobacco Use    Smoking status: Former     Packs/day: 1.00     Types: Cigarettes     Quit date: 2020     Years since quittin.0    Smokeless tobacco: Former   Substance and Sexual Activity     Alcohol use: No     Alcohol/week: 0.0 standard drinks    Drug use: Yes     Types: Marijuana     Comment: RARELY SMOKE - CBD VAPE    Sexual activity: Not Currently        Review of Systems  14-point review of systems as follows:   No check aime indicates NEGATIVE response   Constitutional: [] weight loss, [] change to appetite   Eyes: [] change in vision, [] double vision   Ears, nose, mouth, throat: [] frequent nose bleeds, [] ringing in the ears   Respiratory: [] cough, [] wheezing   Cardiovascular: [] chest pain, [] palpitations   Gastrointestinal: [] jaundice, [] nausea/vomiting   Genitourinary: [] incontinence, [] burning with urination   Hematologic/lymphatic: [] easy bruising/bleeding, [] night sweats   Neurological: [] numbness, [] weakness   Endocrine: [] fatigue, [] heat/cold intolerance   Allergy/Immunologic: [] fevers, [] chills   Musculoskeletal: [x] muscle pain, [x] joint pain   Psychiatric: [] thoughts of harming self/others, [x] depression   Integumentary: [] rashes, [] sores that do not heal     Objective:        Vitals:    11/09/22 1325   BP: 112/75   Pulse: 64   Resp: 17       Body mass index is 36.43 kg/m².    11/9/22  Constitutional:   He appears well-developed and well-nourished. He is well groomed     Neurological Exam:  General: well-developed, well-nourished, no distress  Mental status: Awake and alert  Speech language: No dysarthria or aphasia on conversation  Cranial nerves: Face symmetric  Motor: Moves all extremities well  Coordination: No ataxia. No tremor.    10/24/19   Constitutional: appears in no acute distress until he has a cluster attack in the clinic during which he appears very uncomfortable and restless, well-developed, well-nourished     Eyes: normal conjunctiva, PERRLA, optic discs are flat and sharp bilaterally     Ears, nose, mouth, throat: external appearance of ears and nose normal, hearing intact     Cardiovascular: regular rate and rhythm, no murmurs  appreciated    Respiratory: unlabored respirations, breath sounds normal bilaterally    Gastrointestinal: no visible abdominal masses, no guarding, no visible hernia    Musculoskeletal: normal tone in all four extremities. No atrophy. No abnormal movements. No pronator drift. No orbit. Symmetric finger tapping. Normal gait and station. Digits and nails normal.      Spine:   CERVICAL SPINE:  ROM: normal   MUSCLE SPASM:  Mild throughout  FACET LOADING:  Bilateral  SPURLING: no  WINSOME / IGLESIA tender:  Bilateral    Psychiatric: normal judgment and insight. Oriented to person, place, and time.     Neurologic:   Cortical functions: recent and remote memory intact, normal attention span and concentration, speech fluent, adequate fund of knowledge   Cranial nerves: visual fields full, PERRLA, EOMI, symmetric facial strength, hearing intact, palate elevates symmetrically, shoulder shrug 5/5, tongue protrudes midline   Reflexes: 2+ in the upper and lower extremities, no Graf  Sensation: intact to temperature throughout   Coordination: normal finger to nose and tandem gait    Data Review:     I have personally reviewed the referring provider's notes, labs, & imaging made available to me today.      RADIOLOGY STUDIES:  I have personally reviewed the pertinent images performed.     No results found for this or any previous visit.    Lab Results   Component Value Date     08/24/2021    K 3.8 08/24/2021     08/24/2021    CO2 31 (H) 08/24/2021    BUN 16 08/24/2021    CREATININE 1.2 08/24/2021    GLU 91 08/24/2021    AST 27 08/24/2021    ALT 38 08/24/2021    ALBUMIN 4.3 08/24/2021    ALBUMIN 5.0 01/28/2021    PROT 6.6 08/24/2021    BILITOT 0.3 08/24/2021    CHOL 176 01/13/2022    HDL 42 01/13/2022    LDLCALC 86.8 01/13/2022    TRIG 236 (H) 01/13/2022       Lab Results   Component Value Date    WBC 6.03 01/28/2021    HGB 15.0 01/28/2021    HCT 44.4 01/28/2021    MCV 90 01/28/2021     (H) 01/28/2021       No results  found for: TSH        Assessment & Plan:       Problem List Items Addressed This Visit          Neuro    Intractable episodic cluster headache - Primary    Overview     Initial 2001, much more frequent since 2009  Has trialed in failed an extensive list of as needed and preventative agents with short responses to for example Botox, occipital block, SPG block and infusions    Begin prevention with gliacin and Emgality  Disability at this point is severe and there is significant medication overuse especially to stadol thus I have little hope that any preventative will work without a VERY strong bridge regimen to cool down his current severity, for this reason I recommended inpatient admission for DHE with adjuncts, followed by a prolonged methergine taper.  May consider inpatient or outpatient IV lidocaine.  May consider sphenopalatine block.    Since the headaches are so incredibly refractory and he has not been imaged almost a decade, I do recommend a surveillance brain MRI to make sure we are not dealing with any type of secondary process         Current Assessment & Plan     Most well controlled on Emgality 300 mg monthly. Discussed we will not refill stadol.         Medication overuse headache    Overview     Stadol nasal spray 4-6 sprays per day 5-6 days per week  Off opioids. Uses medical marijuana           Other Visit Diagnoses       Chronic pain syndrome        Followed by pain management. Off opioids. Now on medical marijuana                   Please call our clinic at [phone] or send a message on the [facility] portal if there are any changes to the plan described below, for example,if you are not contacted for the requested tests, referral(s) within one week, if you are unable to receive the medications prescribed, or if you feel you need to change the treatment course for any reason.     TESTING:  -- none     REFERRALS:  -- none     PREVENTION (use daily regardless of headache):  -- continue duloxetine  30mg daily at this time  -- CONTINUE Emgality 300mg (3 x 100mg injections) monthly ([facility])  -- Please try to wear CPAP a minimum of 4 hours nightly. Follow up with sleep specialist      AS-NEEDED TREATMENT (use total no more than 10 days per month unless otherwise stated):  -- recommend limited Stadol use  -- Imitrex use is OK   -- continue compazine. This is a nausea medication that also has anti-migraine properties. Can take daily and will not contribute to rebound headaches      Follow up in about 6 months (around 5/9/2023) for follow up with GLO or MIKE Cruz.    Tania Banerjee NP

## 2022-12-30 DIAGNOSIS — E66.01 MORBID OBESITY: ICD-10-CM

## 2022-12-30 DIAGNOSIS — R00.0 TACHYCARDIA: ICD-10-CM

## 2022-12-30 DIAGNOSIS — E78.00 HYPERCHOLESTEROLEMIA: ICD-10-CM

## 2022-12-30 DIAGNOSIS — R00.2 PALPITATIONS: ICD-10-CM

## 2022-12-30 DIAGNOSIS — I10 ESSENTIAL HYPERTENSION: ICD-10-CM

## 2022-12-30 DIAGNOSIS — I10 ESSENTIAL (PRIMARY) HYPERTENSION: ICD-10-CM

## 2022-12-30 RX ORDER — EZETIMIBE 10 MG/1
10 TABLET ORAL DAILY
Qty: 90 TABLET | Refills: 3 | Status: SHIPPED | OUTPATIENT
Start: 2022-12-30 | End: 2023-06-01 | Stop reason: SDUPTHER

## 2023-01-22 DIAGNOSIS — F41.1 GENERALIZED ANXIETY DISORDER WITH PANIC ATTACKS: ICD-10-CM

## 2023-01-22 DIAGNOSIS — F41.0 GENERALIZED ANXIETY DISORDER WITH PANIC ATTACKS: ICD-10-CM

## 2023-01-22 DIAGNOSIS — F33.1 DEPRESSION, MAJOR, RECURRENT, MODERATE: ICD-10-CM

## 2023-01-23 RX ORDER — DESVENLAFAXINE SUCCINATE 50 MG/1
50 TABLET, EXTENDED RELEASE ORAL DAILY
Qty: 90 TABLET | Refills: 0 | Status: SHIPPED | OUTPATIENT
Start: 2023-01-23 | End: 2023-04-22 | Stop reason: SDUPTHER

## 2023-01-23 NOTE — TELEPHONE ENCOUNTER
"I received refill request for patient's Pristiq. Previously prescribed by CLEOPATRA Schulz who no longer works at [facility].    I reviewed chart, an I called patient. He Reports doing "ok", continues to follow-up with other providers. No acute concerns. Wishes to continue psychiatric medication with no changes. No questions for me.    I inform him that I notified our clinic staff, and we will be reaching out to schedule him with another prescribing provider soon.    I will refill medicine.    Enrique Newton MD  [facility]    "

## 2023-02-05 DIAGNOSIS — I10 ESSENTIAL (PRIMARY) HYPERTENSION: ICD-10-CM

## 2023-02-05 DIAGNOSIS — I10 ESSENTIAL HYPERTENSION: ICD-10-CM

## 2023-02-05 DIAGNOSIS — E78.00 HYPERCHOLESTEROLEMIA: ICD-10-CM

## 2023-02-06 RX ORDER — AMLODIPINE BESYLATE 10 MG/1
10 TABLET ORAL DAILY
Qty: 30 TABLET | Refills: 5 | Status: SHIPPED | OUTPATIENT
Start: 2023-02-06 | End: 2023-07-31 | Stop reason: SDUPTHER

## 2023-03-02 ENCOUNTER — OFFICE VISIT (OUTPATIENT)
Dept: PSYCHIATRY | Facility: CLINIC | Age: 53
End: 2023-03-02
Payer: COMMERCIAL

## 2023-03-02 VITALS
WEIGHT: 254 LBS | BODY MASS INDEX: 36.45 KG/M2 | SYSTOLIC BLOOD PRESSURE: 148 MMHG | DIASTOLIC BLOOD PRESSURE: 90 MMHG | HEART RATE: 76 BPM

## 2023-03-02 DIAGNOSIS — F43.21 SITUATIONAL DEPRESSION: Primary | ICD-10-CM

## 2023-03-02 DIAGNOSIS — Z86.59 HISTORY OF MAJOR DEPRESSION: ICD-10-CM

## 2023-03-02 DIAGNOSIS — F41.8 SITUATIONAL ANXIETY: ICD-10-CM

## 2023-03-02 PROCEDURE — 3077F PR MOST RECENT SYSTOLIC BLOOD PRESSURE >= 140 MM HG: ICD-10-PCS | Mod: CPTII,S$GLB,, | Performed by: PSYCHIATRY & NEUROLOGY

## 2023-03-02 PROCEDURE — 99999 PR PBB SHADOW E&M-EST. PATIENT-LVL II: ICD-10-PCS | Mod: PBBFAC,,, | Performed by: PSYCHIATRY & NEUROLOGY

## 2023-03-02 PROCEDURE — 3008F BODY MASS INDEX DOCD: CPT | Mod: CPTII,S$GLB,, | Performed by: PSYCHIATRY & NEUROLOGY

## 2023-03-02 PROCEDURE — 3080F DIAST BP >= 90 MM HG: CPT | Mod: CPTII,S$GLB,, | Performed by: PSYCHIATRY & NEUROLOGY

## 2023-03-02 PROCEDURE — 99215 PR OFFICE/OUTPT VISIT, EST, LEVL V, 40-54 MIN: ICD-10-PCS | Mod: S$GLB,,, | Performed by: PSYCHIATRY & NEUROLOGY

## 2023-03-02 PROCEDURE — 3080F PR MOST RECENT DIASTOLIC BLOOD PRESSURE >= 90 MM HG: ICD-10-PCS | Mod: CPTII,S$GLB,, | Performed by: PSYCHIATRY & NEUROLOGY

## 2023-03-02 PROCEDURE — 4010F PR ACE/ARB THEARPY RXD/TAKEN: ICD-10-PCS | Mod: CPTII,S$GLB,, | Performed by: PSYCHIATRY & NEUROLOGY

## 2023-03-02 PROCEDURE — 3008F PR BODY MASS INDEX (BMI) DOCUMENTED: ICD-10-PCS | Mod: CPTII,S$GLB,, | Performed by: PSYCHIATRY & NEUROLOGY

## 2023-03-02 PROCEDURE — 4010F ACE/ARB THERAPY RXD/TAKEN: CPT | Mod: CPTII,S$GLB,, | Performed by: PSYCHIATRY & NEUROLOGY

## 2023-03-02 PROCEDURE — 99215 OFFICE O/P EST HI 40 MIN: CPT | Mod: S$GLB,,, | Performed by: PSYCHIATRY & NEUROLOGY

## 2023-03-02 PROCEDURE — 3077F SYST BP >= 140 MM HG: CPT | Mod: CPTII,S$GLB,, | Performed by: PSYCHIATRY & NEUROLOGY

## 2023-03-02 PROCEDURE — 99999 PR PBB SHADOW E&M-EST. PATIENT-LVL II: CPT | Mod: PBBFAC,,, | Performed by: PSYCHIATRY & NEUROLOGY

## 2023-03-02 NOTE — PROGRESS NOTES
PSYCHIATRIC EVALUATION     Name: Keith Duane Crawford  Age: 52 y.o.  : 1970    60 minutes of total time spent on the encounter, which includes face to face time and non-face to face time.  Face-to-face time: 38 minutes.    Preparing to see the patient (reviewing portions of the available record), Performing a medically appropriate evaluation, Counseling and educating the patient/family/caregiver, Ordering medications, labs, or referrals, and Documenting clinical information in the health record      CHIEF COMPLAINT :  Resume contact with a psychiatric provider     HISTORY OF PRESENT ILLNESS:         Keith Duane Crawford a 52 y.o.  male presents today in order to resume treatment in the clinic.  The patient's last visit with a prescriber was in 2022.  Diagnoses were recurrent major depression and generalized anxiety disorder with panic attacks.  Psychotropic medication authorized at that visit was Pristiq 50 mg daily, and it was noted that the patient was also on Cymbalta 60 mg daily, diazepam 5 mg twice daily as needed, and trazodone 100 mg at bedtime, prescribed by another provider, apparently and outside PCP.  In his initial visit in 2021, he described depression, worry, panic attacks, and not liking crowds; it was noted that past medications had included Prozac, Zoloft, and Wellbutrin.    The patient last saw a  in our department in 2022.  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     indicates that the patient is on medical marijuana , filled butorphanol spray on  and previously, filled 30 tablets of diazepam 5 mg on , , December 3, , and previously.    The patient confirms a history of depressive symptoms and anxiety, with depression at times  "meeting criteria for major depressive episodes.  Questioning reveals no history of sawyer or hypomania.  He denies any history of excessive worry, out of the blue panic, or other primary anxiety disorders and reports that anxiety has been a symptom of depression or proportional to stressors.  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    The patient denies active suicidality since 2013.  He admits intermittent passive suicidal ideations that he is able to easily dismiss, such that he has been consistently confident of his safety.  He describes being hopeful and positively future oriented and reports that he is consistently aware of safety factors, particularly his 17-year-old son.  He denies any history of thoughts of harm towards anyone else or feelings of aggression.  He reports rare fleeting auditory hallucinations of people talking somewhere else in the household, but he says that he consistently his able to reality test and to recognize it as a symptom of poor sleep at the time and use of more medical marijuana than usual; these experiences are not bothersome and do not interfere with functioning.  Questioning reveals no other history of psychosis.    The patient confirms past medications and current medications as above.  He reports 3 hospitalizations, 2 for the overdose attempts in 2011 and 2013 and a 3rd when he presented to the emergency department for pain and there was a misunderstanding about thoughts of self-harm.    The patient reports heavy use of alcohol more than 20 years ago but none since then without craving, and he indicates that alcohol is a trigger for his headaches, so he is highly motivated to abstain.  He says that he has always used marijuana" and currently uses medical " marijuana only, in the amount of 1-1/2 oz weekly.  He reports being prescribed pain medications for 10 years in the past and sometimes taking more than prescribed, and he ultimately quit told turkey when the physician stopped prescribing due to a positive marijuana test, and he has had no desire to return to opioids since then.  He says that as an adolescent he went to substance abuse treatments at the request of his father when he was actually not using any substances.      PAST BEHAVIORAL HEALTH HISTORY  As above    SUBSTANCE USE:    Alcohol:  None in years     Other:  None in years.  Currently medical marijuana only.    Allergy Review:   Review of patient's allergies indicates:  No Known Allergies     Medical Problem List:   Patient Active Problem List   Diagnosis    Intractable episodic cluster headache    Anxiety    PRISCILLA (obstructive sleep apnea)    Medication overuse headache    Intractable headache    Essential hypertension    Osteoarthritis    Migraine    Depression, major, recurrent, moderate        Past Surgical History:   Procedure Laterality Date    KNEE SURGERY        Family History:  No family history on file.   Family Psychiatric History:  The patient reports that his biological mother had mental health issues after having to give him up for adoption at 17 years old.    PSYCHO-SOCIAL/DEVELOPMENT HISTORY:     Relationships:  The patient lives with his wife and their 17-year-old son.  He says that he and his wife do not live as a  couple, with separate bedrooms for 8 years, but he says that they are good friends, roommates, and coparents.  He says that his 17-year-old son is doing great and is a senior at [employer].    Education:  Law school    Employment:  Currently on disability.  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      Mental Status Exam:   Appearance:  Appropriately  groomed  Orientation:  X4  Attitude:  Cooperative, engaged   Eye Contact:  Appropriate  Behavior:  Calm, appropriate  Speech:     Rate - WNL    Volume - WNL    Quantity - WNL    Tone - relaxed, appropriate  Pressure - no  Thought Processes:  Goal-directed  Mood:  Euthymic with intermittent depression and anxiety concurrent with stressors   Affect:  Without distress, euthymic, including ability to brighten at appropriate times  SI:  None active, and confident of his safety as described above  HI:  No, and no thoughts of harm towards others or feelings of aggression  Paranoia:  No  Delusions:  No  Hallucinations:  As above  Attention:  Intact over the course of the session  Cognition:  No deficits noted over the course of the session  Insight:  Intact   Judgment:  Intact  Impulse Control:  Intact      Assessment/Plan:     Encounter Diagnoses   Name Primary?    Situational depression Yes    Situational anxiety     History of major depression         Follow up in 3 months.  I also recommended the patient return to see his .  At the end of the appointment, the patient was directed to the  for scheduling.    Psychiatry Medication:  The options of increasing doses of psychotropic medications and other alternatives were reviewed with the patient regarding potential benefits, risks, and side effects.  The patient reports that he is satisfied with his medications currently and wishes no change.  He reports that he has medications and refills available currently and will contact me for refills when needed, questions, or problems.    Reviewed with patient:  Report side effects or any other problems to the psychiatrist during clinic business hours. Call 911 or go to an emergency department for any acute or urgent issues otherwise.  Follow up with primary care/MD specialist for continued monitoring of general health and wellness and any medical conditions.  Call  [facility] at [phone] or  go to [facility]  My Chart if necessary for scheduling or rescheduling.  It is the responsibility of the patient to reschedule an appointment if an appointment has been canceled or missed.  Understanding was expressed; and no further concerns or questions were raised at this time.       There are no Patient Instructions on file for this visit.    Large portions of this note were completed by way of voice recognition dictation software, and transcription errors are possible, such that specific information in the note should be considered in the context of the entire report.

## 2023-04-22 DIAGNOSIS — F33.1 DEPRESSION, MAJOR, RECURRENT, MODERATE: ICD-10-CM

## 2023-04-22 DIAGNOSIS — F41.1 GENERALIZED ANXIETY DISORDER WITH PANIC ATTACKS: ICD-10-CM

## 2023-04-22 DIAGNOSIS — F41.0 GENERALIZED ANXIETY DISORDER WITH PANIC ATTACKS: ICD-10-CM

## 2023-04-24 RX ORDER — DESVENLAFAXINE SUCCINATE 50 MG/1
50 TABLET, EXTENDED RELEASE ORAL DAILY
Qty: 90 TABLET | Refills: 0 | Status: SHIPPED | OUTPATIENT
Start: 2023-04-24 | End: 2023-07-21 | Stop reason: SDUPTHER

## 2023-05-19 RX ORDER — GALCANEZUMAB 100 MG/ML
300 INJECTION, SOLUTION SUBCUTANEOUS
Qty: 3 ML | Refills: 11 | Status: SHIPPED | OUTPATIENT
Start: 2023-05-19

## 2023-06-01 DIAGNOSIS — E78.00 HYPERCHOLESTEROLEMIA: ICD-10-CM

## 2023-06-01 DIAGNOSIS — I10 ESSENTIAL HYPERTENSION: ICD-10-CM

## 2023-06-01 DIAGNOSIS — E66.01 MORBID OBESITY: ICD-10-CM

## 2023-06-01 DIAGNOSIS — I10 ESSENTIAL (PRIMARY) HYPERTENSION: ICD-10-CM

## 2023-06-01 DIAGNOSIS — R00.2 PALPITATIONS: ICD-10-CM

## 2023-06-01 DIAGNOSIS — R00.0 TACHYCARDIA: ICD-10-CM

## 2023-06-01 RX ORDER — EZETIMIBE 10 MG/1
10 TABLET ORAL DAILY
Qty: 90 TABLET | Refills: 3 | Status: SHIPPED | OUTPATIENT
Start: 2023-06-01 | End: 2024-05-31

## 2023-07-21 DIAGNOSIS — F33.1 DEPRESSION, MAJOR, RECURRENT, MODERATE: ICD-10-CM

## 2023-07-21 DIAGNOSIS — F41.1 GENERALIZED ANXIETY DISORDER WITH PANIC ATTACKS: ICD-10-CM

## 2023-07-21 DIAGNOSIS — F41.0 GENERALIZED ANXIETY DISORDER WITH PANIC ATTACKS: ICD-10-CM

## 2023-07-21 RX ORDER — DESVENLAFAXINE SUCCINATE 50 MG/1
50 TABLET, EXTENDED RELEASE ORAL DAILY
Qty: 90 TABLET | Refills: 0 | Status: CANCELLED | OUTPATIENT
Start: 2023-07-21 | End: 2023-10-19

## 2023-07-21 RX ORDER — DESVENLAFAXINE SUCCINATE 50 MG/1
50 TABLET, EXTENDED RELEASE ORAL DAILY
Qty: 90 TABLET | Refills: 0 | Status: SHIPPED | OUTPATIENT
Start: 2023-07-21 | End: 2023-10-19

## 2023-07-26 ENCOUNTER — TELEPHONE (OUTPATIENT)
Dept: PSYCHIATRY | Facility: CLINIC | Age: 53
End: 2023-07-26
Payer: MEDICARE

## 2023-07-26 NOTE — TELEPHONE ENCOUNTER
----- Message from Coni Broderick sent at 7/26/2023 11:03 AM CDT -----  Regarding: Tiffany/[facility]  States she is calling regarding his desvenlafaxine 50 mg. States she has a question she is trying to get clarified.  Please call Tiffany [phone]. Thank you

## 2023-07-31 DIAGNOSIS — I10 ESSENTIAL (PRIMARY) HYPERTENSION: ICD-10-CM

## 2023-07-31 DIAGNOSIS — I10 ESSENTIAL HYPERTENSION: ICD-10-CM

## 2023-07-31 DIAGNOSIS — E78.00 HYPERCHOLESTEROLEMIA: ICD-10-CM

## 2023-07-31 RX ORDER — AMLODIPINE BESYLATE 10 MG/1
10 TABLET ORAL DAILY
Qty: 30 TABLET | Refills: 5 | Status: SHIPPED | OUTPATIENT
Start: 2023-07-31 | End: 2024-01-22 | Stop reason: SDUPTHER

## 2023-08-01 ENCOUNTER — PATIENT MESSAGE (OUTPATIENT)
Dept: PSYCHIATRY | Facility: CLINIC | Age: 53
End: 2023-08-01
Payer: MEDICARE

## 2023-10-18 DIAGNOSIS — F41.0 GENERALIZED ANXIETY DISORDER WITH PANIC ATTACKS: ICD-10-CM

## 2023-10-18 DIAGNOSIS — F33.1 DEPRESSION, MAJOR, RECURRENT, MODERATE: ICD-10-CM

## 2023-10-18 DIAGNOSIS — F41.1 GENERALIZED ANXIETY DISORDER WITH PANIC ATTACKS: ICD-10-CM

## 2023-10-18 RX ORDER — DESVENLAFAXINE SUCCINATE 50 MG/1
50 TABLET, EXTENDED RELEASE ORAL DAILY
Qty: 90 TABLET | Refills: 0 | OUTPATIENT
Start: 2023-10-18 | End: 2024-01-16

## 2023-10-27 ENCOUNTER — HOSPITAL ENCOUNTER (OUTPATIENT)
Dept: RADIOLOGY | Facility: HOSPITAL | Age: 53
Discharge: HOME | End: 2023-10-27
Payer: MEDICARE

## 2023-10-27 DIAGNOSIS — J06.9 ACUTE UPPER RESPIRATORY INFECTION, UNSPECIFIED: ICD-10-CM

## 2023-10-27 DIAGNOSIS — J06.9 UPPER RESPIRATORY INFECTION: ICD-10-CM

## 2023-10-27 PROCEDURE — 71046 XR CHEST PA AND LATERAL: ICD-10-PCS | Mod: 26,,, | Performed by: RADIOLOGY

## 2023-10-27 PROCEDURE — 71046 X-RAY EXAM CHEST 2 VIEWS: CPT | Mod: TC,PN

## 2023-10-27 PROCEDURE — 71046 X-RAY EXAM CHEST 2 VIEWS: CPT | Mod: 26,,, | Performed by: RADIOLOGY

## 2024-01-22 DIAGNOSIS — I10 ESSENTIAL (PRIMARY) HYPERTENSION: ICD-10-CM

## 2024-01-22 DIAGNOSIS — I10 ESSENTIAL HYPERTENSION: ICD-10-CM

## 2024-01-22 DIAGNOSIS — E78.00 HYPERCHOLESTEROLEMIA: ICD-10-CM

## 2024-01-23 RX ORDER — AMLODIPINE BESYLATE 10 MG/1
10 TABLET ORAL DAILY
Qty: 30 TABLET | Refills: 5 | Status: SHIPPED | OUTPATIENT
Start: 2024-01-23

## 2024-04-12 ENCOUNTER — TELEPHONE (OUTPATIENT)
Dept: NEUROLOGY | Facility: CLINIC | Age: 54
End: 2024-04-12
Payer: COMMERCIAL

## 2024-05-03 ENCOUNTER — TELEPHONE (OUTPATIENT)
Dept: NEUROLOGY | Facility: CLINIC | Age: 54
End: 2024-05-03
Payer: MEDICARE

## 2024-05-03 ENCOUNTER — OFFICE VISIT (OUTPATIENT)
Dept: NEUROLOGY | Facility: CLINIC | Age: 54
End: 2024-05-03
Payer: MEDICARE

## 2024-05-03 VITALS
WEIGHT: 271.06 LBS | BODY MASS INDEX: 38.81 KG/M2 | SYSTOLIC BLOOD PRESSURE: 146 MMHG | RESPIRATION RATE: 18 BRPM | TEMPERATURE: 97 F | HEART RATE: 82 BPM | DIASTOLIC BLOOD PRESSURE: 101 MMHG | HEIGHT: 70 IN

## 2024-05-03 DIAGNOSIS — G44.011 INTRACTABLE EPISODIC CLUSTER HEADACHE: Primary | ICD-10-CM

## 2024-05-03 DIAGNOSIS — G43.719 INTRACTABLE CHRONIC MIGRAINE WITHOUT AURA AND WITHOUT STATUS MIGRAINOSUS: ICD-10-CM

## 2024-05-03 DIAGNOSIS — E66.01 MORBID OBESITY: ICD-10-CM

## 2024-05-03 DIAGNOSIS — G44.40 MEDICATION OVERUSE HEADACHE: ICD-10-CM

## 2024-05-03 PROCEDURE — 99214 OFFICE O/P EST MOD 30 MIN: CPT | Mod: S$GLB,,, | Performed by: NURSE PRACTITIONER

## 2024-05-03 PROCEDURE — 99999 PR PBB SHADOW E&M-EST. PATIENT-LVL III: CPT | Mod: PBBFAC,,, | Performed by: NURSE PRACTITIONER

## 2024-05-03 PROCEDURE — 3080F DIAST BP >= 90 MM HG: CPT | Mod: CPTII,S$GLB,, | Performed by: NURSE PRACTITIONER

## 2024-05-03 PROCEDURE — 3008F BODY MASS INDEX DOCD: CPT | Mod: CPTII,S$GLB,, | Performed by: NURSE PRACTITIONER

## 2024-05-03 PROCEDURE — 4010F ACE/ARB THERAPY RXD/TAKEN: CPT | Mod: CPTII,S$GLB,, | Performed by: NURSE PRACTITIONER

## 2024-05-03 PROCEDURE — 1159F MED LIST DOCD IN RCRD: CPT | Mod: CPTII,S$GLB,, | Performed by: NURSE PRACTITIONER

## 2024-05-03 PROCEDURE — 1160F RVW MEDS BY RX/DR IN RCRD: CPT | Mod: CPTII,S$GLB,, | Performed by: NURSE PRACTITIONER

## 2024-05-03 PROCEDURE — 3077F SYST BP >= 140 MM HG: CPT | Mod: CPTII,S$GLB,, | Performed by: NURSE PRACTITIONER

## 2024-05-03 RX ORDER — GALCANEZUMAB 100 MG/ML
300 INJECTION, SOLUTION SUBCUTANEOUS
Qty: 3 ML | Refills: 11 | Status: SHIPPED | OUTPATIENT
Start: 2024-05-03

## 2024-05-03 NOTE — PROGRESS NOTES
"Date of service: 5/3/2024  Referring provider: No ref. provider found    Subjective:      Chief complaint: Headache         Patient ID: Keith Duane Crawford is a 53 y.o. gentleman with intractable cluster headaches presenting for follow up of headache    He is followed at the [facility] clinic in [facility] for chronic back pain    History of Present Illness    INTERVAL HISTORY 5/3/24    Last visit was about 18 months ago and at that time he was better.    Today he reports he is the same. Current pain 6 with range 3-9. He has a daily headache for past three months. Prior to that, he has 2-3 per week. She takes sumatriptan two days per week. He has "spikes" of seconds long stabbing headaches. Otherwise information below is reviewed and verified with no changes made     INTERVAL HISTORY 11/9/22    Last visit with me was two years ago. He has been followed by Dr. Guerrero and last visit with her was April 2021. At that time, he was started on Emgality 300 mg. Brain MRI done then was normal.    Today he reports he is a little better. He is smoking marijuana. Headaches are left sided. Current pain 3 with range 2-9. Headaches are daily. Otherwise information below is reviewed and verified with no changes made     INTERVAL HISTORY 11/18/2020  At last visit, 2 months ago, we recommended he contact the [facility] headache and pain department for possible evaluation. He was to start gliacin and continue emgality. Goal to have him start monthly PRN lidocaine infusions. We also added compazine and lidocaine NS.    Today he reports he is a little worse. He still has cluster headaches 3-4 days per week. He reports increased pain due to needing a tooth pulled (will happen next week), recent kidney stone, and life stressors. Headaches occur left side and behind the eye. Current pain 2 with range 1-8. He remains with daily headache. He takes either sumatriptan, oxycodone, butalbital, or tylenol daily. He does report he has not " had to go to ER which is an improvement.     He has received the Gliacon but has not started. He is not wearing CPAP at all due to comfort.     INTERVAL HISTORY 9/22/2020  At last visit, MRI brain ordered (normal) and he was to start Gliacin and Emgality for cluster migraines. Bridge of IV infusions also ordered. He was admitted to [facility] 9/7 for lidocaine protocol. He was also in MOD to stadol nasal spray and hydrocodone.     Today he reports he is about the same. Headaches are left sided. current pain 3 with range 1-8. He still has headaches 7 days per week. He takes tylenol, aleve, advil, sumatriptan, stadol, oxycodone 5 days per week.  Oxycodone three days per week. Stadol is on backorder until October, last used before hospital infusions. Sumatriptan IM at least once per week. OTC daily. He has not started the Gliacin, he states he forgot. He has used lidocaine NS which helps some. He has been on Emgality 300 mg for at least a year.    He is followed by Dr. Singh for back pain. He is scheduled for epidural steroid injections tomorrow.     He does not wear CPAP mask.     ORIGINAL HEADACHE HISTORY - 10/24/2019  Age at onset and course over time:  The headaches began in 2001 with 2 clusters, which went away until 2009 but had new onset migraines in between, improved in frequency on topiramate for a while. Developed knee problems and was on various pain medications. In Nov 2009 had his 5th knee scope. Was recommended to wean off pain medications. Has had chronic cluster since Nov 2009.  Reverted at some point to episodic frequency on Botox however this lost efficacy after approximately a year or so.    He was in the [facility] galcanezumab study - helped with frequency and intensity of his cluster headaches    Location:  Left fronto/temporal / parietal  Quality:  [x] pressure [] tight [] throbbing [x] sharp [x] stabbing   Severity:  Current 3-4, at its best to, at its worst 9  Duration:  Sec, minutes, hours, days,  constant  Frequency:  Daily, never pain-free, constant left hemicrania pain with escalations 2 dozen times per day with left ipsilateral autonomic features, rocks during escalations   Headaches awaken at night?:   3-4 nights per week  Worst time of day:  Mid day in the evening  Associated with: [x] photophobia [x]  phonophobia [] osmophobia [] blurred vision  [] double vision [x] loss of appetite [x] nausea [x] vomiting [x] dizziness [] vertigo  [] tinnitus [] irritability [x] sinus pressure [] problems with concentration   [] neck tightness   Alleviated by:  [] sleep [] darkness [] massage [] heat [] ice [x] medication  Exacerbated by:  [x] fatigue [x] light [] noise [] smells [] coughing [] sneezing  [] bending over [] ovulation [] menses [] alcohol [x] change in weather [x]  stress  Ipsilateral autonomic: [x] nasal congestion [x] lacrimation [] ptosis [] injection [] edema [] foreign body sensation [] ear fullness   ICP:  No transient visual obscurations, no tinnitus, no positional changes to headaches  Sleep habits:  He has trouble falling asleep, staying asleep, and non refreshing sleep he does have sleep apnea  Caffeine intake:  320 mg per day    Migraines - same location, more throbbing, sometimes bilateral     MIDAS 75 indicating severe disability  Hit 6 72    Current acute treatment:  Stadol nasal spray - daily for at least past 3 years   Medical marijuana - daily  Reglan  Tizanidine  Voltaren - hip and lower back   Sumatriptan IM - 2 days per week    Compazine    Current prevention:  Duloxetine 30 mg daily  Lisinopril  Emgality 300 mg monthly   (Amlodipine)  Trazodone  Metoprolol    Previously tried/failed acute treatment:  Oxygen 15 L  Tylenol  Aspirin  Diclofenac  Ibuprofen  Indomethacin - unsure dose   Ketorolac  Aleve  Meloxicam  DHE infusions inpatient 3 days, some response, tolerates it OK   Axert  Relpax  Frova  Emerged  Maxalt  Treximet  Imitrex   Zomig  Fioricet  Excedrin  Infusions of - benadryl,  phenergan, reglan, dilaudid, decadron - helpful for days   Oxycodone 10mg 2-3 per day 5-6 days per week - reduced to 2-3 days per week  Sumatriptan  Compazine - effective   Lidocaine NS - some relief  Ubrelvy    Previously tried/failed preventative treatment:  Lamotrigine  Trileptal  Lyrica  Topiramate  Keppra  Depakote  Valium  Xanax  Klonopin  Amitriptyline  Nortriptyline  Venlafaxine  Trazodone  Celexa  Lexapro  Gabapentin  Metoprolol  Bystolic  Propranolol  Verapamil  Gamma core  Cephaly  Occipital neurostimulation  Sphenopalatine ganglion block  Occipital nerve block  Ketamine infusion  Seroquel + depakote - caused first overdose  Lithium - developed toxicity   Botox - worked for about 1.5 years, chronic to episodic   Desvenlafaxine      Review of patient's allergies indicates:  No Known Allergies  Current Outpatient Medications   Medication Sig Dispense Refill    allopurinoL (ZYLOPRIM) 300 MG tablet Take 1 tablet by mouth every day 30 tablet 5    allopurinoL (ZYLOPRIM) 300 MG tablet Take 1 tablet by mouth every day 30 tablet 5    amLODIPine (NORVASC) 10 MG tablet TAKE 1 TABLET BY MOUTH EVERY DAY 30 tablet 5    aspirin (ECOTRIN) 81 MG EC tablet Take 1 tablet orally once a day. 90 tablet 4    atorvastatin (LIPITOR) 10 MG tablet Take 1 tablet every day by oral route. 30 tablet 5    atorvastatin (LIPITOR) 20 MG tablet Take 1 tablet by mouth every day 30 tablet 5    butorphanol (STADOL) 10 mg/mL nasal spray 1 spray by Nasal route every 4 (four) hours as needed for Pain (migraine).      cetirizine (ZYRTEC) 10 MG tablet Take 10 mg by mouth once daily.      diazePAM (VALIUM) 5 MG tablet Take 5 mg by mouth 2 (two) times daily as needed for Anxiety.       diazePAM (VALIUM) 5 MG tablet Take 1 tablet (5 mg total) by mouth 2 (two) times daily. 60 tablet 3    diclofenac (VOLTAREN) 75 MG EC tablet Take 1 tablet by mouth 2 (two) times daily as needed for up to 14 days. 28 tablet 0    ezetimibe (ZETIA) 10 mg tablet Take 1  tablet (10 mg total) by mouth once daily. 90 tablet 3    fenofibrate 160 MG Tab Take 160 mg by mouth once daily.      fenofibrate 160 MG Tab TAKE 1 TABLET BY MOUTH EVERY DAY 30 tablet 3    hydroCHLOROthiazide (HYDRODIURIL) 25 MG tablet Take 1 tablet by mouth every day 30 tablet 2    LIDOcaine HCl 2% (LIDOCAINE VISCOUS) 2 % Soln by Mucous Membrane route 2 (two) times daily as needed (cluster headache). 100 mL 3    lisinopriL (PRINIVIL,ZESTRIL) 40 MG tablet SMARTSI Tablet(s) By Mouth Daily      lisinopriL (PRINIVIL,ZESTRIL) 40 MG tablet Take 1 tablet by mouth every day 30 tablet 5    lisinopriL (PRINIVIL,ZESTRIL) 40 MG tablet Take 1 tablet (40 mg total) by mouth once daily. 30 tablet 5    metoprolol tartrate (LOPRESSOR) 25 MG tablet Take 1 tablet by mouth twice a day 60 tablet 5    nitroGLYCERIN (NITROSTAT) 0.4 MG SL tablet Take 1 tablet (sublingual) every 5 minutes as needed for chest pains. You may take up to 3 times, after 3rd dose and chest pains persists, go to nearest ER. 75 tablet 2    prochlorperazine (COMPAZINE) 10 MG tablet Take 1 tablet (10 mg total) by mouth every 6 (six) hours as needed (migraine or nausea). 60 tablet 11    SUMAtriptan succinate 6 mg/0.5 mL Crtg Inject 6 mg twice a week by subcutaneous route. 2 each 4    tiZANidine (ZANAFLEX) 4 MG tablet Take 1 tablet (4 mg total) by mouth 3 (three) times daily as needed for pain/ muscle spasms. / may cause sedation/ DO NOT DRIVE ON MED 90 tablet 5    tiZANidine (ZANAFLEX) 4 MG tablet Take 1 tablet by mouth 3 times a day 90 tablet 3    tiZANidine (ZANAFLEX) 4 MG tablet Take 1 tablet by mouth 3 times a day 90 tablet 5    traZODone (DESYREL) 100 MG tablet Take 1 tablet (100 mg total) by mouth every evening. 30 tablet 4    galcanezumab-gnlm 300 mg/3 mL (100 mg/mL x 3) Syrg Inject 3 mLs (300 mg total) into the skin every 28 days. 3 mL 11     No current facility-administered medications for this visit.       Past Medical History  Past Medical History:    Diagnosis Date    Arthritis     Back pain     Cluster headaches     Depression     HTN (hypertension)     Hyperlipidemia     PRISCILLA on CPAP        Past Surgical History  Past Surgical History:   Procedure Laterality Date    [facility] cath  07/2022    KNEE SURGERY      L4 - L5 fusion  12/2023       Family History  No family history on file.    Social History  Social History     Socioeconomic History    Marital status:    Tobacco Use    Smoking status: Former     Current packs/day: 0.00     Types: Cigarettes     Quit date: 11/4/2020     Years since quitting: 3.4    Smokeless tobacco: Former   Substance and Sexual Activity    Alcohol use: No     Alcohol/week: 0.0 standard drinks of alcohol    Drug use: Yes     Types: Marijuana     Comment: RARELY SMOKE - CBD VAPE    Sexual activity: Not Currently     Social Determinants of Health     Financial Resource Strain: Medium Risk (12/8/2023)    [facility] [place]    Overall Financial Resource Strain (CARDIA)     Difficulty of Paying Living Expenses: Somewhat hard   Food Insecurity: No Food Insecurity (12/13/2023)    [facility] [place] [facility] Subsidi[facility] and Affiliates    Hunger Vital Sign     Worried About Running Out of Food in the Last Year: Never true     Ran Out of Food in the Last Year: Never true   Transportation Needs: No Transportation Needs (12/13/2023)    Received from [facility] and [facility]    PRAPARE - Transportation     Lack of Transportation (Medical): No     Lack of Transportation (Non-Medical): No   Physical Activity: Inactive (12/8/2023)    Received from [facility] and [facility]    Exercise Vital Sign     Days of Exercise per Week: 0 days     Minutes of Exercise per Session: 0 min   Stress: No Stress Concern  Present (12/8/2023)    Received from [facility] Subsidiaries and Affiliates    [employer] - Occupational Stress Questionnaire     Feeling of Stress : Only a little      Objective:        Vitals:    05/03/24 0948   BP: (!) 146/101   Pulse: 82   Resp: 18   Temp: 97.4 °F (36.3 °C)         Body mass index is 38.89 kg/m².    5/3/24  Constitutional:   He appears well-developed and well-nourished. He is well groomed. Depressed.      Neurological Exam:  General: well-developed, well-nourished, no distress  Mental status: Awake and alert  Speech language: No dysarthria or aphasia on conversation  Cranial nerves: Face symmetric  Motor: Moves all extremities well  Coordination: No ataxia. No tremor.      Data Review:     I have personally reviewed the referring provider's notes, labs, & imaging made available to me today.      RADIOLOGY STUDIES:  I have personally reviewed the pertinent images performed.     No results found for this or any previous visit.    Lab Results   Component Value Date     07/18/2022     08/24/2021    K 3.9 07/18/2022    K 3.8 08/24/2021     07/18/2022     08/24/2021    CO2 23 07/18/2022    CO2 31 (H) 08/24/2021    BUN 20 07/18/2022    BUN 16 08/24/2021    CREATININE 0.84 07/18/2022    CREATININE 1.2 08/24/2021    GLU 91 08/24/2021    AST 27 08/24/2021    ALT 38 08/24/2021    ALBUMIN 4.0 07/17/2022    ALBUMIN 4.3 08/24/2021    ALBUMIN 5.0 01/28/2021    PROT 6.6 08/24/2021    BILITOT 0.3 08/24/2021    CHOL 180 07/16/2022    CHOL 176 01/13/2022    HDL 53 07/16/2022    HDL 42 01/13/2022    LDLCALC 108 07/16/2022    LDLCALC 86.8 01/13/2022    TRIG 93 07/16/2022    TRIG 236 (H) 01/13/2022       Lab Results   Component Value Date    WBC 6.03 01/28/2021    HGB 15.0 01/28/2021    HCT 44.4 01/28/2021    MCV 90 01/28/2021     (H) 01/28/2021       Lab Results   Component Value Date    TSH 0.723 07/16/2022            Assessment & Plan:       Problem List Items Addressed This Visit          Neuro    Intractable episodic cluster headache - Primary    Overview     Initial 2001, much more frequent since 2009  Has trialed in failed an extensive list of as needed and preventative agents with short responses to for example Botox, occipital block, SPG block and infusions    He has been on Emgality for several years with some improvement.   Disability at this point is severe and there is significant medication overuse especially to stadol (he takes daily and will withdraw if misses days).           Current Assessment & Plan     Continue Emgality as this has given the best and safest response. Again encouraged weaning down significantly on stadol.         Relevant Medications    galcanezumab-gnlm 300 mg/3 mL (100 mg/mL x 3) Syrg    Medication overuse headache    Overview     Stadol nasal spray daily.  Uses medical marijuana          Intractable chronic migraine without aura and without status migrainosus    Overview     The patient has chronic migraines ( G43.719) and suffers from headaches more than 3 months, more than 15 days of headache days per month lasting more than 4 hours with at least 8 attacks that meet criteria for migraine. He has tried multiple medications including but not limited to current: Duloxetine 30 mg daily, Lisinopril, Emgality, (Amlodipine), Trazodone, Metoprolol  Previous: Lamotrigine, Trileptal, Lyrica, Topiramate, Keppra, Depakote, Amitriptyline, Nortriptyline, Venlafaxine Trazodone, Celexa, Lexapro, Gabapentin, Metoprolol, Bystolic, Propranolol, Verapamil, Seroquel + depakote - caused first overdose, Lithium - developed toxicity, Desvenlafaxine  The patient has been unresponsive and refractory.The patient meets criteria for chronic headaches according to the ICHD-II, the patient has more than 15 headaches a month which last for more than 4 hours a day. The patient is an ideal candidate for Botox.  After treatment, I expect 50%  improvement in the patient's symptoms. A reduction of at least 7 days per month and the number of cumulative hours suffering with headaches as well as at least 100 total hours affected with migraine per month.  DESCRIPTION OF PROCEDURE: After obtaining informed consent and under aseptic technique, a total of 155 units of botulinum toxin type A to be injected in the following muscles:      -- Procerus 5 units  --  5 units bilaterally  -- Frontalis 20 units  -- Temporalis 20 units bilaterally  -- Occipitalis 15 units bilaterally  -- Upper cervical paraspinals 10 units bilaterally  -- Trapezius 15 units bilaterally.       Unavoidable waste 45 units           Relevant Orders    Prior authorization Order       Endocrine    Morbid obesity    Current Assessment & Plan     BMI 38 today. Follow up with PCP                    Please call our clinic at [phone] or send a message on the [email or web address] portal if there are any changes to the plan described below, for example,if you are not contacted for the requested tests, referral(s) within one week, if you are unable to receive the medications prescribed, or if you feel you need to change the treatment course for any reason.     TESTING:  -- none     REFERRALS:  -- none     PREVENTION (use daily regardless of headache):  -- continue duloxetine 30mg daily at this time  -- CONTINUE Emgality 300mg (3 x 100mg injections) monthly ([facility])  -- Please try to wear CPAP a minimum of 4 hours nightly. Follow up with sleep specialist    -- SEEK authorization to start Botox    AS-NEEDED TREATMENT (use total no more than 10 days per month unless otherwise stated):  -- recommend limited Stadol use  -- Imitrex use is OK   -- continue compazine. This is a nausea medication that also has anti-migraine properties. Can take daily and will not contribute to rebound headaches      Follow up in about 3 weeks (around 5/24/2024) for first  botox.    Tania Banerjee, NP

## 2024-05-03 NOTE — ASSESSMENT & PLAN NOTE
Continue Emgality as this has given the best and safest response. Again encouraged weaning down significantly on stadol.

## 2024-05-03 NOTE — PATIENT INSTRUCTIONS
Please call our clinic at [phone] or send a message on the [facility] portal if there are any changes to the plan described below, for example,if you are not contacted for the requested tests, referral(s) within one week, if you are unable to receive the medications prescribed, or if you feel you need to change the treatment course for any reason.     TESTING:  -- none     REFERRALS:  -- none     PREVENTION (use daily regardless of headache):  -- continue duloxetine 30mg daily at this time  -- CONTINUE Emgality 300mg (3 x 100mg injections) monthly ([facility])  -- Please try to wear CPAP a minimum of 4 hours nightly. Follow up with sleep specialist    -- SEEK authorization to start Botox    AS-NEEDED TREATMENT (use total no more than 10 days per month unless otherwise stated):  -- recommend limited Stadol use  -- Imitrex use is OK   -- continue compazine. This is a nausea medication that also has anti-migraine properties. Can take daily and will not contribute to rebound headaches

## 2024-05-07 ENCOUNTER — TELEPHONE (OUTPATIENT)
Dept: NEUROLOGY | Facility: CLINIC | Age: 54
End: 2024-05-07
Payer: MEDICARE

## 2024-05-28 ENCOUNTER — PATIENT MESSAGE (OUTPATIENT)
Dept: NEUROLOGY | Facility: CLINIC | Age: 54
End: 2024-05-28
Payer: MEDICARE

## 2024-06-03 ENCOUNTER — PROCEDURE VISIT (OUTPATIENT)
Dept: NEUROLOGY | Facility: CLINIC | Age: 54
End: 2024-06-03
Payer: MEDICARE

## 2024-06-03 VITALS
BODY MASS INDEX: 38.82 KG/M2 | HEART RATE: 106 BPM | WEIGHT: 271.19 LBS | DIASTOLIC BLOOD PRESSURE: 94 MMHG | TEMPERATURE: 98 F | SYSTOLIC BLOOD PRESSURE: 153 MMHG | HEIGHT: 70 IN | RESPIRATION RATE: 17 BRPM

## 2024-06-03 DIAGNOSIS — G43.719 INTRACTABLE CHRONIC MIGRAINE WITHOUT AURA AND WITHOUT STATUS MIGRAINOSUS: Primary | ICD-10-CM

## 2024-06-03 PROCEDURE — 64615 CHEMODENERV MUSC MIGRAINE: CPT | Mod: S$GLB,,, | Performed by: NURSE PRACTITIONER

## 2024-06-03 NOTE — PROCEDURES

## 2024-08-02 ENCOUNTER — OFFICE VISIT (OUTPATIENT)
Dept: NEUROLOGY | Facility: CLINIC | Age: 54
End: 2024-08-02
Payer: MEDICARE

## 2024-08-02 DIAGNOSIS — G43.719 INTRACTABLE CHRONIC MIGRAINE WITHOUT AURA AND WITHOUT STATUS MIGRAINOSUS: Primary | ICD-10-CM

## 2024-08-02 DIAGNOSIS — G44.40 MEDICATION OVERUSE HEADACHE: ICD-10-CM

## 2024-08-02 DIAGNOSIS — G44.011 INTRACTABLE EPISODIC CLUSTER HEADACHE: ICD-10-CM
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
## 2024-08-02 NOTE — PATIENT INSTRUCTIONS
Please call our clinic at [phone] or send a message on the [facility] portal if there are any changes to the plan described below, for example,if you are not contacted for the requested tests, referral(s) within one week, if you are unable to receive the medications prescribed, or if you feel you need to change the treatment course for any reason.     TESTING:  -- none     REFERRALS:  -- none     PREVENTION (use daily regardless of headache):  -- continue duloxetine 30mg daily at this time  -- CONTINUE Emgality 300mg (3 x 100mg injections) monthly ([facility])  -- Please try to wear CPAP a minimum of 4 hours nightly. Follow up with sleep specialist    -- continue Botox    AS-NEEDED TREATMENT (use total no more than 10 days per month unless otherwise stated):  -- recommend limited Stadol use  -- Imitrex use is OK   -- continue compazine. This is a nausea medication that also has anti-migraine properties. Can take daily and will not contribute to rebound headaches

## 2024-08-26 ENCOUNTER — PROCEDURE VISIT (OUTPATIENT)
Dept: NEUROLOGY | Facility: CLINIC | Age: 54
End: 2024-08-26
Payer: MEDICARE

## 2024-08-26 VITALS
HEART RATE: 87 BPM | SYSTOLIC BLOOD PRESSURE: 122 MMHG | DIASTOLIC BLOOD PRESSURE: 81 MMHG | BODY MASS INDEX: 38.82 KG/M2 | HEIGHT: 70 IN | WEIGHT: 271.19 LBS | TEMPERATURE: 97 F | RESPIRATION RATE: 17 BRPM

## 2024-08-26 DIAGNOSIS — G43.719 INTRACTABLE CHRONIC MIGRAINE WITHOUT AURA AND WITHOUT STATUS MIGRAINOSUS: Primary | ICD-10-CM

## 2024-08-26 PROCEDURE — 64615 CHEMODENERV MUSC MIGRAINE: CPT | Mod: S$GLB,,, | Performed by: NURSE PRACTITIONER

## 2024-08-26 RX ORDER — METFORMIN HYDROCHLORIDE 500 MG/1
500 TABLET ORAL 2 TIMES DAILY WITH MEALS
COMMUNITY

## 2024-08-26 NOTE — PROCEDURES
Procedures  A time out was conducted just before the start of the procedure to verify the correct patient and procedure, procedure location, and all relevant critical information.      Conventional methods of treatment such as multiple medications, both on and   off label have been tried including:     The patient has been unresponsive and refractory.The patient meets criteria for chronic headaches according to the ICHD-II, the patient has more than 15 headaches a month which last for more than 4 hours a day.     Botox session number: 2  Last session was 12 weeks ago and resulted in improvement of: n/a     I am aiming for at least 50%  improvement in the patient's symptoms. Frequency of treatment is every 3 months unless no response to the treatments, at which time we will discontinue the injections.      DESCRIPTION OF PROCEDURE: After obtaining informed consent and under   aseptic technique, a total of 155 units of botulinum toxin type A were   injected in the following muscles:      -- Procerus 5 units  --  5 units bilaterally  -- Frontalis 20 units  -- Temporalis 20 units bilaterally  -- Occipitalis 15 units bilaterally  -- Upper cervical paraspinals 10 units bilaterally  -- Trapezius 15 units bilaterally.      The patient tolerated the procedure well. There were no complications. The patient was given a prescription for repeat treatment in 12 weeks      Unavoidable waste 45 units    KLEBER Terry         
I personally performed the service described in the documentation recorded by the scribe in my presence, and it accurately and completely records my words and actions.

## 2024-09-24 ENCOUNTER — OFFICE VISIT (OUTPATIENT)
Dept: PODIATRY | Facility: CLINIC | Age: 54
End: 2024-09-24
Payer: MEDICARE

## 2024-09-24 VITALS — BODY MASS INDEX: 38.82 KG/M2 | HEIGHT: 70 IN | WEIGHT: 271.19 LBS

## 2024-09-24 DIAGNOSIS — L60.8 PINCER NAIL DEFORMITY: ICD-10-CM

## 2024-09-24 DIAGNOSIS — L60.0 INGROWN TOENAIL OF BOTH FEET: Primary | ICD-10-CM

## 2024-09-24 PROCEDURE — 3008F BODY MASS INDEX DOCD: CPT | Mod: CPTII,S$GLB,, | Performed by: PODIATRIST

## 2024-09-24 PROCEDURE — 99203 OFFICE O/P NEW LOW 30 MIN: CPT | Mod: S$GLB,,, | Performed by: PODIATRIST

## 2024-09-24 PROCEDURE — 99999 PR PBB SHADOW E&M-EST. PATIENT-LVL IV: CPT | Mod: PBBFAC,,, | Performed by: PODIATRIST

## 2024-09-24 PROCEDURE — 4010F ACE/ARB THERAPY RXD/TAKEN: CPT | Mod: CPTII,S$GLB,, | Performed by: PODIATRIST

## 2024-09-24 PROCEDURE — 1159F MED LIST DOCD IN RCRD: CPT | Mod: CPTII,S$GLB,, | Performed by: PODIATRIST

## 2024-09-24 NOTE — PROGRESS NOTES
Podiatry Department    Patient ID: Keith Duane Crawford is a 54 y.o. male.    Chief Complaint: Ingrown Toenail (C/o ingrown toenail removal left 2nd toe,  pt rates pain 3/10 if bumped or walking , pt is non-diabetic)    History of Present Illness    CHIEF COMPLAINT:  Chief Complaint    INGROWN TOENAILS AND NAIL CARE:  He presents with ingrown toenails affecting the second toes bilaterally, which developed after stubbing both toes across the nail. He reports pain and sensitivity to touch and pressure in the affected toes, noticeable when bumped or in contact with bedcovers. He has a history of self-treating ingrown toenails, particularly on the second digits of both feet, and describes experiencing pincher nail deformities. He attributes these to previously stubbing both toes, stating that these nails now naturally grow into the skin. He recently made efforts to grow out his nails and trim them appropriately, which has resulted in fewer problems, though the two affected toes continue to be problematic. He denies any professional ingrown nail procedures in the past.    NERVE PAIN:  He reports experiencing nerve pain in his legs, which is significant enough to partially mask other sensations, including the pain from his ingrown toenails. The nerve pain becomes more noticeable when he bumps into something or when the covers touch his legs.    HAND NAIL HISTORY:  He reports a history of nail biting affecting his hand nails and self-treating hand nail infections, including draining them with hot pins and cutting them. He denies current issues with hand nails, stating he has started to let them grow out and trim them appropriately, which has resulted in fewer problems.    SOCIAL HISTORY:  He is a former smoker.      ROS:  Musculoskeletal: -joint pain            Physical Exam    Extremities: Left second digit: Incurvated nail plate bilateral borders. Left second digit: Mild erythema on lateral border. Left second digit:  Dry drainage on lateral border. Right second digit: Medial border incurvated. Right second digit: No erythema. Right second digit: No drainage.           Diagnoses:  1. Ingrown toenail of both feet    2. Pincer nail deformity        Assessment & Plan    - Identified pincher nail deformities on patient's toes, predisposing to ingrown nails  - Assessed left 2nd digit with incurvated nail plate bilaterally, mild erythema, and dry drainage on lateral border  - Evaluated right 2nd digit with medial border incurvation, no erythema or drainage  - Performed ingrown toenail procedure on affected digits    INGROWN TOENAIL:  - Explained that pincher nail deformities make patient more prone to ingrown nails.  - Discussed proper nail care techniques to reduce ingrown nail occurrence: cutting nails straight across and allowing nails to grow out.  - Patient to perform Epsom salt soaks starting today and for the rest of the week.  - Patient to trim nails appropriately to prevent future ingrown nail issues.  - Started Neosporin: Apply to affected areas and cover with Band-Aids.  - Performed ingrown toenail procedure on affected digits.    FOLLOW UP:  - Follow up to monitor for reduction in swelling and erythema of affected toes.            Future Appointments   Date Time Provider Department Center   11/19/2024  2:00 PM Tania Banerjee NP [facility] HEADACH [place]   12/4/2024  2:00 PM Edna Rothman PA-C [facility] ART [place]        This note was generated with the assistance of ambient listening technology. Verbal consent was obtained by the patient and accompanying visitor(s) for the recording of patient appointment to facilitate this note. I attest to having reviewed and edited the generated note for accuracy, though some syntax or spelling errors may persist. Please contact the author of this note for any clarification.      Report Electronically Signed By:     Jesica Bartlett DPM   Podiatry  [facility]-  [place]  10/4/2024

## 2024-11-19 ENCOUNTER — PROCEDURE VISIT (OUTPATIENT)
Dept: NEUROLOGY | Facility: CLINIC | Age: 54
End: 2024-11-19
Payer: MEDICARE

## 2024-11-19 VITALS
BODY MASS INDEX: 36.58 KG/M2 | HEART RATE: 85 BPM | HEIGHT: 70 IN | SYSTOLIC BLOOD PRESSURE: 141 MMHG | DIASTOLIC BLOOD PRESSURE: 95 MMHG | WEIGHT: 255.5 LBS

## 2024-11-19 DIAGNOSIS — G43.719 INTRACTABLE CHRONIC MIGRAINE WITHOUT AURA AND WITHOUT STATUS MIGRAINOSUS: Primary | ICD-10-CM

## 2024-11-19 PROCEDURE — 64615 CHEMODENERV MUSC MIGRAINE: CPT | Mod: S$GLB,,, | Performed by: NURSE PRACTITIONER

## 2024-11-19 RX ORDER — GABAPENTIN 300 MG/1
300 CAPSULE ORAL 2 TIMES DAILY
COMMUNITY

## 2024-11-19 NOTE — PROCEDURES

## 2025-01-17 DIAGNOSIS — G44.011 INTRACTABLE EPISODIC CLUSTER HEADACHE: ICD-10-CM

## 2025-01-21 RX ORDER — GALCANEZUMAB 100 MG/ML
INJECTION, SOLUTION SUBCUTANEOUS
Qty: 3 ML | Refills: 11 | Status: SHIPPED | OUTPATIENT
Start: 2025-01-21

## 2025-02-07 ENCOUNTER — OFFICE VISIT (OUTPATIENT)
Dept: PODIATRY | Facility: CLINIC | Age: 55
End: 2025-02-07
Payer: MEDICARE

## 2025-02-07 VITALS — WEIGHT: 255.5 LBS | HEIGHT: 70 IN | BODY MASS INDEX: 36.58 KG/M2

## 2025-02-07 DIAGNOSIS — L84 CORN OR CALLUS: Primary | ICD-10-CM

## 2025-02-07 DIAGNOSIS — B07.0 PLANTAR WART OF LEFT FOOT: ICD-10-CM

## 2025-02-07 NOTE — PATIENT INSTRUCTIONS
Plantar Wart Aftercare: CANTHARIDIN topical    You have just had a topical agent applied to your plantar wart(s) to help kill the virus.  The following instructions will optimize the outcome of your wart removal procedure today. Cantharidin is a blistering solution which causes redness, swelling and fluid accumulation under the lesion. It is a strong agent that will help kill the virus on your foot.      Cantharidin   1. Keep the treated areas covered with a bandage or tape if applied at your visit. This will prevent potentially getting the medication on any unwanted skin area or eyes. Also avoid getting the treated areas moist or wet as this may spread the cantharone to unwanted areas.     2. After 24 hours after  having cantharone applied in clinic, you should wash it off gently with a wash cloth that you can throw away after. Gently wash the area off and leave a small film over the treated area (s). Make sure to     If severe stinging or burning occurs before the 24 hours are up, it is okay to wash the area sooner.     3. If you do not remove the chemical further blistering can  occur. Blistering will start to form in about 3 to 8 hours post treatment for most people. Some people may be very sensitive to the agent and may experience a tingling or burning sensation or discomfort/pain at the treatment site(s). Tylenol or Advil may be taken for discomfort.       4. If blistering occurs, cleanse daily with an antibacterial soap and apply Polysporin or Vaseline ointment and a band-aid.     5.  If a severe reaction does occur (large blistering, intense redness, pus, swelling, or pain), please call the office.     6. Tomorrow, you may START TO APPLY SALICYLIC ACID (can be purchased over the counter near foot products at local pharmacy) to the wart treatment areas and cover with DUCT TAPE.  Perform this once daily until you return back to see Dr. Bartlett.      In 1 to 2 weeks crusting, scabbing and peeling occur.   "Bisinicoledaniel will see you back in her office for follow up visit. Some people might require a second treatment if the virus still remains.      Plantar Warts    Contrary to the old folk tale, you can't get warts from touching a toad. Warts are non-cancerous skin growths caused by human papillomavirus (HPV). This virus is passed from person to person where it enters the body through breaks in the skin. The time between contact to developing lesions can be months or longer.   Some people are more likely to get warts than others.  This may be due to the portal of entry of the wart virus, such as in children who bite their nails or pick at hangnails.  Patients with weaker immune systems are also more likely to get warts.    Warts are usually skin-colored and rough to the touch.  There are several kinds of warts such as common, plantar (foot), and flat.  Plantar warts usually occur on the soles of the feet. The HPV virus grows in warm, moist environments, such as those created in a locker room or in your shoes when your feet perspire and the moisture is trapped.  Plantar warts will often spread to other areas of the foot, increase in size, and have "babies," resulting in a cluster that resembles a mosaic. Plantar warts can erupt anywhere on the sole of the foot. They may be difficult to distinguish from calluses. However, you may be able to see tiny black dots on the surface layer of a plantar wart. These are the ends of capillary blood vessels. .  Plantar warts can be very painful and tender. Standing and walking causes the wart to pinch the sensory nerves between the folds of skin which causes pain.    In children, warts can disappear without treatment over months to years.  In adults, this does not happen as easily or quickly.  Painful, rapidly multiplying warts should be treated.      Treatment  There are a variety of treatments for warts.  Most treatments aim to stimulate your bodys immune response to the lesion. The " wart can resolve without therapy in many cases.  Salacylic acid in a solution or plaster (available over-the-counter) applied daily may take weeks to months of treatment.  Cantharone (bettle juice extract) and/or cryotherapy (freezing the wart) are the most common treatments in the medical office setting.  Cantharone is typically more effective and requires fewer applications than cryotherapy. Both of these treatments produce blistering of the skin and help the body eliminate the wart.  Surgical intervention (cutting the wart out), injections, and immunotherapy are other office-based therapies used less commonly for warts.  Duct tape applied to warts continuously (removed as it peels and reapplied after washing the area) for 6-12 weeks can also be effective for treatment.      Prevention  To reduce your risk for getting plantar warts, be sure to wear shower thongs or sandals when you use a public locker room or shower. Use foot powders and change your socks frequently to keep the feet dry.      More information on Plantar Wart:  Plantar Wart (Verruca Plantaris)       What is a Plantar Wart?  A wart is a small growth on the skin that develops when the skin is infected by a virus. Warts can develop anywhere on the foot, but typically they appear on the bottom (plantar side) of the foot. Plantar warts most commonly occur in children, adolescents, and the elderly.  There are two types of plantar warts:  A solitary wart is a single wart. It often increases in size and may eventually multiply, forming additional satellite warts.   Mosaic warts are a cluster of several small warts growing closely together in one area. Mosaic warts are more difficult to treat than solitary warts.   Causes  Plantar warts are caused by direct contact with the human papilloma virus (HPV). This is the same virus that causes warts on other areas of the body.  Symptoms  The symptoms of a plantar wart may include:  Thickened skin. Often a  plantar wart resembles a callus because of its tough, thick tissue.   Pain. Walking and standing may be painful. Squeezing the sides of the wart may also cause pain.   Tiny black dots. These often appear on the surface of the wart. The dots are actually dried blood contained in the capillaries (tiny blood vessels).   Plantar warts grow deep into the skin. Usually this growth occurs slowly, with the wart starting small and becoming larger over time.  Diagnosis and Treatment  To diagnose a plantar wart, the foot and ankle surgeon will examine the patients foot and look for signs and symptoms of a wart.  Although plantar warts may eventually clear up on their own, most patients desire faster relief. The goal of treatment is to completely remove the wart.  The foot and ankle surgeon may use topical or oral treatments, laser therapy, cryotherapy (freezing), acid treatments, or surgery to remove the wart.  Regardless of the treatment approaches undertaken, it is important that the patient follow the surgeons instructions, including all home care and medication that has been prescribed, as well as follow-up visits with the surgeon. Warts may return, requiring further treatment.  If there is no response to treatment, further diagnostic evaluation may be necessary. In such cases, the surgeon can perform a biopsy to rule out other potential causes for the growth.  Although there are many folk remedies for warts, patients should be aware that these remain unproven and may be dangerous. Patients should never try to remove warts themselves. This can do more harm than good.  Copyright © 2011    [employer] ([employer]), All Rights Reserved.

## 2025-02-07 NOTE — PROGRESS NOTES
PODIATRIC MEDICINE AND SURGERY          CHIEF COMPLAINT   Chief Complaint   Patient presents with    Callouses     Callous to plantar surface of LLE. Flesh colored and slightly raised. X 1.5 yrs.Diabetic. 1/10 pain rating. Last PCP appt was with Dr Judson Bear 1/15/2025.       HPI  Keith Duane Crawford is a 54 y.o. male who presents with complaints of painful lesion on bottom of left foot.   Patient denies other pedal complaints at this time.      PMH  Past Medical History:   Diagnosis Date    Arthritis     Back pain     Cluster headaches     Depression     HTN (hypertension)     Hyperlipidemia     PRISCILLA on CPAP        PROBLEM LIST  Patient Active Problem List    Diagnosis Date Noted    Morbid obesity 05/03/2024    Depression, major, recurrent, moderate 09/22/2020    Intractable chronic migraine without aura and without status migrainosus 09/07/2020    Intractable headache 12/02/2019    Essential hypertension 12/02/2019    Osteoarthritis 12/02/2019    Medication overuse headache 10/24/2019    Intractable episodic cluster headache 02/11/2019    Anxiety 02/11/2019    PRISCILLA (obstructive sleep apnea) 02/11/2019       MEDS  Current Outpatient Medications on File Prior to Visit   Medication Sig Dispense Refill    allopurinoL (ZYLOPRIM) 300 MG tablet Take 1 tablet by mouth every day 30 tablet 5    amLODIPine (NORVASC) 10 MG tablet TAKE 1 TABLET BY MOUTH EVERY DAY (Patient taking differently: Take 5 mg by mouth once daily.) 30 tablet 5    atorvastatin (LIPITOR) 20 MG tablet Take 1 tablet by mouth every day 30 tablet 5    butorphanol (STADOL) 10 mg/mL nasal spray 1 spray by Nasal route every 4 (four) hours as needed for Pain (migraine).      cetirizine (ZYRTEC) 10 MG tablet Take 10 mg by mouth once daily.      diazePAM (VALIUM) 5 MG tablet Take 1 tablet (5 mg total) by mouth 2 (two) times daily. 60 tablet 3    fenofibrate 160 MG Tab TAKE 1 TABLET BY MOUTH EVERY DAY 30 tablet 3    gabapentin (NEURONTIN) 300 MG capsule Take  300 mg by mouth 2 (two) times daily.      GALCANEZUMAB-GNLM 300 mg/3 mL (100 mg/mL x 3) Syringe INJECT THE CONTENTS OF 3  SYRINGES (300 MG) SUBCUTANEOUSLY EVERY 28 DAYS 3 mL 11    hydroCHLOROthiazide (HYDRODIURIL) 25 MG tablet Take 1 tablet by mouth every day 30 tablet 2    LIDOcaine HCl 2% (LIDOCAINE VISCOUS) 2 % Soln by Mucous Membrane route 2 (two) times daily as needed (cluster headache). 100 mL 3    lisinopriL (PRINIVIL,ZESTRIL) 40 MG tablet Take 1 tablet (40 mg total) by mouth once daily. 30 tablet 5    metFORMIN (GLUCOPHAGE) 500 MG tablet Take 500 mg by mouth 2 (two) times daily with meals.      metoprolol tartrate (LOPRESSOR) 25 MG tablet Take 1 tablet by mouth twice a day 60 tablet 5    SUMAtriptan succinate 6 mg/0.5 mL Crtg Inject 6 mg twice a week by subcutaneous route. 2 each 4    tiZANidine (ZANAFLEX) 4 MG tablet Take 1 tablet by mouth 3 times a day 90 tablet 5    traZODone (DESYREL) 100 MG tablet Take 1 tablet (100 mg total) by mouth every evening. 30 tablet 2    allopurinoL (ZYLOPRIM) 300 MG tablet Take 1 tablet by mouth every day 30 tablet 5    atorvastatin (LIPITOR) 10 MG tablet Take 1 tablet every day by oral route. 30 tablet 5    diazePAM (VALIUM) 5 MG tablet Take 5 mg by mouth 2 (two) times daily as needed for Anxiety.      ezetimibe (ZETIA) 10 mg tablet Take 1 tablet (10 mg total) by mouth once daily. 90 tablet 3    fenofibrate 160 MG Tab Take 160 mg by mouth once daily.      lisinopriL (PRINIVIL,ZESTRIL) 40 MG tablet       lisinopriL (PRINIVIL,ZESTRIL) 40 MG tablet Take 1 tablet by mouth every day 30 tablet 5    prochlorperazine (COMPAZINE) 10 MG tablet Take 1 tablet (10 mg total) by mouth every 6 (six) hours as needed (migraine or nausea). 60 tablet 11    tiZANidine (ZANAFLEX) 4 MG tablet Take 1 tablet (4 mg total) by mouth 3 (three) times daily as needed for pain/ muscle spasms. / may cause sedation/ DO NOT DRIVE ON MED 90 tablet 5    tiZANidine (ZANAFLEX) 4 MG tablet Take 1 tablet by mouth 3  "times a day 90 tablet 3     Current Facility-Administered Medications on File Prior to Visit   Medication Dose Route Frequency Provider Last Rate Last Admin    onabotulinumtoxina injection 200 Units  200 Units Intramuscular q12 weeks    200 Units at 25 1411       PSH     Past Surgical History:   Procedure Laterality Date    APPENDECTOMY      cardic cath  2022    KNEE SURGERY      L4 - L5 fusion  2023        ALL  Review of patient's allergies indicates:  No Known Allergies    SOC     Social History     Tobacco Use    Smoking status: Every Day     Current packs/day: 0.00     Types: Cigarettes     Last attempt to quit: 2020     Years since quittin.2    Smokeless tobacco: Former   Substance Use Topics    Alcohol use: No     Alcohol/week: 0.0 standard drinks of alcohol    Drug use: Yes     Types: Marijuana     Comment: RARELY SMOKE - CBD VAPE         Family HX    Family History   Problem Relation Name Age of Onset    Diabetes Mother      Heart disease Mother            REVIEW OF SYSTEMS  General: Denies any fever or chills  Chest: Denies shortness of breath, wheezing, coughing, or sputum production  Heart: Denies chest pain, cold extremities, orthopenia, or reduced exercise tolerance  As noted above and per history of current illness above, otherwise negative in the remainder of the 14 systems.     PHYSICAL EXAM  Vitals:    25 1329   Weight: 115.9 kg (255 lb 8.2 oz)   Height: 5' 10" (1.778 m)   PainSc: 10-Worst pain ever       General: This patient is well-developed, well-nourished and appears stated age, well-oriented to person, place and time, and cooperative and pleasant on today's visit        LOWER EXTREMITY PHYSICAL EXAM      DERMATOLOGIC  Thickened hyperkeratotic tissue noted left plantar foot. Post debridement reveals loss of skin lines. There is punctate bleeding with debridement     NEUROLOGIC  Epicritic sensation is intact as the patient is able to sense light touch to bilateral foot " and ankle regions. Achilles and patellar deep tendon reflexes intact. Babinski reflex absent    ORTHOPEDIC/BIOMECHANICAL  Mild pain on palpation of above noted lesion especially with side to side compression. Muscle strength AT/EHL/EDL/PT: 5/5; Achilles/Gastroc/Soleus: 5/5; PB/PL: 5/5 Muscle tone is normal. Ankle joint ROM non painful with DF/PF, non-crepitus; STJ ROM  Inversion/Eversion non painful, non crepitus ; MTPJ b/l  DF/PF, non crepitus     ASSESSMENT  1. Plantar verrucae, left foot    PLAN    1. Patient was educated about clinical and imaging findings, and verbalizes understanding of above.  2. Treatment plan: Risks and benefits of the procedure were discussed with the patient.  Risks include pain, bleeding, scarring, loss of sensation (temporary or permanent), infection, anesthesia reaction, and need for further or repeat procedures.  Benefits of the procedure include resolution of the condition.  All questions were answered and the patient gave verbal consent to proceed.      With patient's permission, sharp excisional debridement and chemical destruction of soft tissue lesion deep to epidermal layer x 1 was performed with #15 blade. Patient relates relief following the procedure. Cantharidin applied to wart site. Silk tape applied for occlusion and then an offloading donut pad. Pt instructed to keep adhesive intact for 2-4 hours. Post operative care provided in AVS.    3. RTC  for follow up/evaluation in 3 weeks, repeat application as necessary, or sooner if any issues, questions or concerns.      Future Appointments   Date Time Provider Department Center   2/18/2025  1:45 PM Jimy Sterling MD [facility] ENT [place]   3/4/2025  1:30 PM Jesica Bartlett DPM [facility] POD [place]   3/12/2025 11:00 AM Edna Rothman PA-C [facility] DERM [place]   5/7/2025  2:00 PM Tania Banerjee NP [facility] [place]       Report Electronically Signed By:     Jesica Bartlett DPM   Podiatry  [facility]-   2/17/2025

## 2025-02-12 ENCOUNTER — PROCEDURE VISIT (OUTPATIENT)
Dept: NEUROLOGY | Facility: CLINIC | Age: 55
End: 2025-02-12
Payer: MEDICARE

## 2025-02-12 VITALS
TEMPERATURE: 97 F | BODY MASS INDEX: 36.58 KG/M2 | HEIGHT: 70 IN | WEIGHT: 255.5 LBS | HEART RATE: 80 BPM | RESPIRATION RATE: 17 BRPM | SYSTOLIC BLOOD PRESSURE: 133 MMHG | DIASTOLIC BLOOD PRESSURE: 85 MMHG

## 2025-02-12 DIAGNOSIS — G43.719 INTRACTABLE CHRONIC MIGRAINE WITHOUT AURA AND WITHOUT STATUS MIGRAINOSUS: Primary | ICD-10-CM

## 2025-02-12 PROCEDURE — 64615 CHEMODENERV MUSC MIGRAINE: CPT | Mod: S$GLB,,, | Performed by: NURSE PRACTITIONER

## 2025-02-12 NOTE — PROCEDURES
Procedures  A time out was conducted just before the start of the procedure to verify the correct patient and procedure, procedure location, and all relevant critical information.      Conventional methods of treatment such as multiple medications, both on and   off label have been tried including:     The patient has been unresponsive and refractory.The patient meets criteria for chronic headaches according to the ICHD-II, the patient has more than 15 headaches a month which last for more than 4 hours a day.     Botox session number: 4  Last session was 12 weeks ago and resulted in improvement of: 50%     I am aiming for at least 50%  improvement in the patient's symptoms. Frequency of treatment is every 3 months unless no response to the treatments, at which time we will discontinue the injections.      DESCRIPTION OF PROCEDURE: After obtaining informed consent and under   aseptic technique, a total of 155 units of botulinum toxin type A were   injected in the following muscles:      -- Procerus 5 units  --  5 units bilaterally  -- Frontalis 20 units  -- Temporalis 20 units bilaterally  -- Occipitalis 15 units bilaterally  -- Upper cervical paraspinals 10 units bilaterally  -- Trapezius 15 units bilaterally.      The patient tolerated the procedure well. There were no complications. The patient was given a prescription for repeat treatment in 12 weeks      Unavoidable waste 45 units    KLEBER Terry

## 2025-02-18 ENCOUNTER — OFFICE VISIT (OUTPATIENT)
Dept: OTOLARYNGOLOGY | Facility: CLINIC | Age: 55
End: 2025-02-18
Payer: MEDICARE

## 2025-02-18 VITALS — WEIGHT: 255.5 LBS | BODY MASS INDEX: 36.58 KG/M2 | HEIGHT: 70 IN

## 2025-02-18 DIAGNOSIS — R13.10 DYSPHAGIA, UNSPECIFIED TYPE: ICD-10-CM

## 2025-02-18 DIAGNOSIS — K21.9 LARYNGOPHARYNGEAL REFLUX (LPR): Primary | ICD-10-CM

## 2025-02-18 RX ORDER — PANTOPRAZOLE SODIUM 40 MG/1
40 TABLET, DELAYED RELEASE ORAL DAILY
Qty: 90 TABLET | Refills: 3 | Status: SHIPPED | OUTPATIENT
Start: 2025-02-18 | End: 2026-02-18

## 2025-02-18 NOTE — PROGRESS NOTES
"Referring Provider:    No referring provider defined for this encounter.  Subjective:   Patient: Keith Duane Crawford [id number], :1970   Visit date:2025 2:09 PM    Chief Complaint:  Sore Throat (Pt states for the past year when he eats he feels his pulse in the throat increase and his throat closes /Pt states he is able to eat and drink with no issues /Pt states he has hx of headaches and sinus drainage )    HPI:    Prior notes reviewed by myself.  Clinical documentation obtained by nursing staff reviewed.       54-year-old gentleman presents for evaluation of dysphagia.  He reports episodes after eating a few bites of food where he feels constriction in his throat and he feels that his pulse increases.  These episodes prompted him to stop eating and the symptoms resolve.  This does not happen with any sort of pattern or consistency or any type of food.  He has no history of odynophagia.    He does have a history of severe cluster headaches and migraine headaches.  He reports occasional heartburn and throat clearing.        Objective:     Physical Exam:  Vitals:  Ht 5' 10" (1.778 m)   Wt 115.9 kg (255 lb 8.2 oz)   BMI 36.66 kg/m²   General appearance:  Well developed, well nourished    Ears:  Otoscopy of external auditory canals and tympanic membranes was normal, clinical speech reception thresholds grossly intact, no mass/lesion of auricle.    Nose:  No masses/lesions of external nose, nasal mucosa, septum, and turbinates were within normal limits.    Mouth:  No mass/lesion of lips, teeth, gums, hard/soft palate, tongue, tonsils, or oropharynx.    Neck & Lymphatics:  No cervical lymphadenopathy, no neck mass/crepitus/ asymmetry, trachea is midline, no thyroid enlargement/tenderness/mass.    Due to indication in patient's history, presentation or risk factors,  a fiber optic exam was performed.    SEPARATE PROCEDURE NOTE:    ANESTHESIA:  Topical xylocaine with antony-synephrine  FINDINGS:  Moderate to " severe inaterarytenoid erythema and edema    PROCEDURE:  After verbal consent was obtained, the flexible scope was passed through the patient's nasal cavity without difficulty.  The nasopharynx (adenoid pad) and eustachian tube orifices were first visualized and were found to be normal, without masses or irregularity.  The posterior pharyngeal wall and base of tongue were then examined and no mass or irregular tissue was seen.  The scope was then advanced to the larynx, and the epiglottis, valleculae, and piriform sinuses were normal, without masses or mucosal irregularity.  The false vocal folds and true vocal folds were then examined and were found to have normal mobility (full abduction and adduction) and no masses or mucosal irregularity was seen.  The interartyenoid area had moderate to severe edema and erythema consistent with reflux. Visualized portions of the subglottis were also noted to be normal in appearance with normal tracheal mucosa.      [x]  Data Reviewed:    Lab Results   Component Value Date    WBC 6.03 01/28/2021    HGB 15.0 01/28/2021    HCT 44.4 01/28/2021    MCV 90 01/28/2021    EOSINOPHIL 2.5 01/28/2021             Assessment & Plan:   Laryngopharyngeal reflux (LPR)  -     pantoprazole (PROTONIX) 40 MG tablet; Take 1 tablet (40 mg total) by mouth once daily.  Dispense: 90 tablet; Refill: 3    Dysphagia, unspecified type         He has evidence of LPR on his flexible laryngoscopy.  No other pathology was visualize.  We discussed options including modified barium swallow study and/or GI referral for EGD.  We also discussed the option for treating his reflux and having him follow-up for re-evaluation.  He would like to take the more conservative route and will follow-up in 6 weeks.    Jimy Sterling M.D.  Department of Otolaryngology - Head & Neck Surgery  [address] [facility].  [place] [id number]  P: [phone]  F: [phone]        DISCLAIMER: This note was prepared with MModal voice  recognition transcription software. Garbled syntax, mangled pronouns, and other bizarre constructions may be attributed to that software system. While efforts were made to correct any mistakes made by this voice recognition program, some errors and/or omissions may remain in the note that were missed when the note was originally created.

## 2025-05-07 ENCOUNTER — PATIENT MESSAGE (OUTPATIENT)
Dept: NEUROLOGY | Facility: CLINIC | Age: 55
End: 2025-05-07
Payer: MEDICARE

## 2025-05-08 ENCOUNTER — TELEPHONE (OUTPATIENT)
Dept: DERMATOLOGY | Facility: CLINIC | Age: 55
End: 2025-05-08
Payer: MEDICARE

## 2025-05-08 NOTE — TELEPHONE ENCOUNTER
Called and r/s appt on 5/15 due to provider being out. Pt rescheduled to earlier next week instead. Pt accepted outcome

## 2025-05-12 ENCOUNTER — OFFICE VISIT (OUTPATIENT)
Dept: DERMATOLOGY | Facility: CLINIC | Age: 55
End: 2025-05-12
Payer: MEDICARE

## 2025-05-12 DIAGNOSIS — L91.8 ACROCHORDON: ICD-10-CM

## 2025-05-12 DIAGNOSIS — L82.1 SEBORRHEIC KERATOSES: ICD-10-CM

## 2025-05-12 DIAGNOSIS — D22.9 MULTIPLE NEVI: Primary | ICD-10-CM

## 2025-05-12 DIAGNOSIS — L82.0 INFLAMED SEBORRHEIC KERATOSIS: ICD-10-CM

## 2025-05-12 PROCEDURE — 4010F ACE/ARB THERAPY RXD/TAKEN: CPT | Mod: CPTII,S$GLB,, | Performed by: PHYSICIAN ASSISTANT

## 2025-05-12 PROCEDURE — 1160F RVW MEDS BY RX/DR IN RCRD: CPT | Mod: CPTII,S$GLB,, | Performed by: PHYSICIAN ASSISTANT

## 2025-05-12 PROCEDURE — 99203 OFFICE O/P NEW LOW 30 MIN: CPT | Mod: 25,S$GLB,, | Performed by: PHYSICIAN ASSISTANT

## 2025-05-12 PROCEDURE — 99999 PR PBB SHADOW E&M-EST. PATIENT-LVL II: CPT | Mod: PBBFAC,,, | Performed by: PHYSICIAN ASSISTANT

## 2025-05-12 PROCEDURE — 1159F MED LIST DOCD IN RCRD: CPT | Mod: CPTII,S$GLB,, | Performed by: PHYSICIAN ASSISTANT

## 2025-05-12 PROCEDURE — 17110 DESTRUCTION B9 LES UP TO 14: CPT | Mod: S$GLB,,, | Performed by: PHYSICIAN ASSISTANT

## 2025-05-12 NOTE — PROGRESS NOTES
Subjective:      Patient ID:  Keith Duane Crawford is a 54 y.o. male who presents for   Chief Complaint   Patient presents with    Spot     Spots on right knee area,face,right collar bone,left side of face,under left eye     History of Present Illness: The patient presents with chief complaint of several spots.  Location: left knee, right temple, around eyes  Duration: several months  Signs/Symptoms: raised, rough, some seem wart like, dark in color. States he had a wart of left foot and some remind him of it.     Prior treatments: treated wart of foot with an otc acid (reduced), but hasn't treated any of these spots          Review of Systems   Constitutional:  Negative for fever and chills.   Gastrointestinal:  Negative for nausea and vomiting.   Skin:  Negative for itching, rash, dry skin, daily sunscreen use, activity-related sunscreen use, recent sunburn and dry lips.   Hematologic/Lymphatic: Does not bruise/bleed easily.       Objective:   Physical Exam   Constitutional: He appears well-developed and well-nourished. No distress.   Neurological: He is alert and oriented to person, place, and time. He is not disoriented.   Psychiatric: He has a normal mood and affect.   Skin:   Areas Examined (abnormalities noted in diagram):   Head / Face Inspection Performed  Neck Inspection Performed  Chest / Axilla Inspection Performed  Back Inspection Performed  RUE Inspected  LUE Inspection Performed                 Diagram Legend     Erythematous scaling macule/papule c/w actinic keratosis       Vascular papule c/w angioma      Pigmented verrucoid papule/plaque c/w seborrheic keratosis      Yellow umbilicated papule c/w sebaceous hyperplasia      Irregularly shaped tan macule c/w lentigo     1-2 mm smooth white papules consistent with Milia      Movable subcutaneous cyst with punctum c/w epidermal inclusion cyst      Subcutaneous movable cyst c/w pilar cyst      Firm pink to brown papule c/w dermatofibroma       Pedunculated fleshy papule(s) c/w skin tag(s)      Evenly pigmented macule c/w junctional nevus     Mildly variegated pigmented, slightly irregular-bordered macule c/w mildly atypical nevus      Flesh colored to evenly pigmented papule c/w intradermal nevus       Pink pearly papule/plaque c/w basal cell carcinoma      Erythematous hyperkeratotic cursted plaque c/w SCC      Surgical scar with no sign of skin cancer recurrence      Open and closed comedones      Inflammatory papules and pustules      Verrucoid papule consistent consistent with wart     Erythematous eczematous patches and plaques     Dystrophic onycholytic nail with subungual debris c/w onychomycosis     Umbilicated papule    Erythematous-base heme-crusted tan verrucoid plaque consistent with inflamed seborrheic keratosis     Erythematous Silvery Scaling Plaque c/w Psoriasis     See annotation      Assessment / Plan:        Multiple nevi  Discussed ABCDE's of nevi.  Monitor for new mole or moles that are becoming bigger, darker, irritated, or developing irregular borders. Brochure provided. Instructed patient to observe lesion(s) for changes and follow up in clinic if changes are noted. Patient to monitor skin at home for new or changing lesions.     Seborrheic keratoses  Reassurance given.  Lesions are benign.    Inflamed seborrheic keratosis  Reassurance given. Discussed diagnosis and that lesions are benign.  After risks, benefits and alternatives explained, including blistering, pain, hyper- and hypopigmentation, patient verbally consents to cryotherapy to benign lesions. Several lesions of the trunk were treated with cryotherapy.    Acrochordon  Reassurance given.  Lesions are benign.         Follow up in about 6 months (around 11/12/2025) for mole check.

## 2025-05-14 ENCOUNTER — PROCEDURE VISIT (OUTPATIENT)
Dept: NEUROLOGY | Facility: CLINIC | Age: 55
End: 2025-05-14
Payer: MEDICARE

## 2025-05-14 VITALS
SYSTOLIC BLOOD PRESSURE: 105 MMHG | HEIGHT: 70 IN | WEIGHT: 255.5 LBS | BODY MASS INDEX: 36.58 KG/M2 | DIASTOLIC BLOOD PRESSURE: 69 MMHG | HEART RATE: 65 BPM | RESPIRATION RATE: 17 BRPM | TEMPERATURE: 97 F

## 2025-05-14 DIAGNOSIS — G43.719 INTRACTABLE CHRONIC MIGRAINE WITHOUT AURA AND WITHOUT STATUS MIGRAINOSUS: Primary | ICD-10-CM

## 2025-05-14 NOTE — PROCEDURES
Procedures  A time out was conducted just before the start of the procedure to verify the correct patient and procedure, procedure location, and all relevant critical information.      Conventional methods of treatment such as multiple medications, both on and   off label have been tried including:     The patient has been unresponsive and refractory.The patient meets criteria for chronic headaches according to the ICHD-II, the patient has more than 15 headaches a month which last for more than 4 hours a day.     Botox session number: 5  Last session was 12 weeks ago and resulted in improvement of: 50%     I am aiming for at least 50%  improvement in the patient's symptoms. Frequency of treatment is every 3 months unless no response to the treatments, at which time we will discontinue the injections.      DESCRIPTION OF PROCEDURE: After obtaining informed consent and under   aseptic technique, a total of 155 units of botulinum toxin type A were   injected in the following muscles:      -- Procerus 5 units  --  5 units bilaterally  -- Frontalis 20 units  -- Temporalis 20 units bilaterally  -- Occipitalis 15 units bilaterally  -- Upper cervical paraspinals 10 units bilaterally  -- Trapezius 15 units bilaterally.      The patient tolerated the procedure well. There were no complications. The patient was given a prescription for repeat treatment in 12 weeks      Unavoidable waste 45 units    KLEBER Terry

## 2025-05-23 ENCOUNTER — TELEPHONE (OUTPATIENT)
Dept: NEUROLOGY | Facility: CLINIC | Age: 55
End: 2025-05-23
Payer: MEDICARE

## 2025-05-23 NOTE — TELEPHONE ENCOUNTER
----- Message from Lola sent at 5/23/2025  2:14 PM CDT -----  Contact: RASHID TRACY  Type:  Needs Medical AdviceWho Called: PHU TRACYymptoms (please be specific):  ZEMBRACE PLEASE CALL TO CONFIRM THE QUANTITY FOR THE PA REQUEST  How long has patient had these symptoms:  N/APharmacy name and phone #:  N/AWould the patient rather a call back or a response via [facility]? CALL Best Call Back Number:  [phone] Information: THANK YOU

## 2025-08-11 ENCOUNTER — PATIENT MESSAGE (OUTPATIENT)
Dept: NEUROLOGY | Facility: CLINIC | Age: 55
End: 2025-08-11
Payer: MEDICARE

## 2025-08-18 ENCOUNTER — PROCEDURE VISIT (OUTPATIENT)
Dept: NEUROLOGY | Facility: CLINIC | Age: 55
End: 2025-08-18
Payer: MEDICARE

## 2025-08-18 VITALS
BODY MASS INDEX: 37.01 KG/M2 | SYSTOLIC BLOOD PRESSURE: 129 MMHG | HEART RATE: 69 BPM | DIASTOLIC BLOOD PRESSURE: 94 MMHG | WEIGHT: 257.94 LBS

## 2025-08-18 DIAGNOSIS — G43.719 INTRACTABLE CHRONIC MIGRAINE WITHOUT AURA AND WITHOUT STATUS MIGRAINOSUS: Primary | ICD-10-CM

## 2025-08-18 PROCEDURE — 64615 CHEMODENERV MUSC MIGRAINE: CPT | Mod: S$GLB,,, | Performed by: NURSE PRACTITIONER

## 2025-08-29 ENCOUNTER — OFFICE VISIT (OUTPATIENT)
Dept: NEUROLOGY | Facility: CLINIC | Age: 55
End: 2025-08-29
Payer: MEDICARE

## 2025-08-29 DIAGNOSIS — G44.40 MEDICATION OVERUSE HEADACHE: ICD-10-CM

## 2025-08-29 DIAGNOSIS — G44.011 INTRACTABLE EPISODIC CLUSTER HEADACHE: ICD-10-CM

## 2025-08-29 DIAGNOSIS — G43.719 INTRACTABLE CHRONIC MIGRAINE WITHOUT AURA AND WITHOUT STATUS MIGRAINOSUS: Primary | ICD-10-CM

## 2025-08-29 DIAGNOSIS — E66.01 MORBID OBESITY: ICD-10-CM
